# Patient Record
Sex: FEMALE | URBAN - METROPOLITAN AREA
[De-identification: names, ages, dates, MRNs, and addresses within clinical notes are randomized per-mention and may not be internally consistent; named-entity substitution may affect disease eponyms.]

---

## 2017-01-06 ENCOUNTER — CARE COORDINATION (OUTPATIENT)
Dept: CARE COORDINATION | Facility: CLINIC | Age: 57
End: 2017-01-06

## 2017-01-06 NOTE — PROGRESS NOTES
1/6/2017 Clinic Care Coordination Contact  Social Work    Received a call from [place]. [place]  She had attempted to apply on -line but kept getting ERROR.  Together we [place] a [place] call to [place] they helped her get a Username and reset her password. Pt will have her daughter help her tonight on her computer. This writer recommended she contact a Navigator if she has problems.  Provided pt. With the names and #s of two Navigators close to her home.  Pt ended the conversation as she stated she was getting anxious.   Plan: pt will attempt [place] with [place]. Will contact a Navigator if needed and call SW  SW will follow up in 1 week    Keely Sparks [facility] Services  , Clinic Care Coordination  Clinics:  Karen Patel [place], [place]  [phone]   1/6/2017   10:59 AM

## 2017-01-10 ENCOUNTER — CARE COORDINATION (OUTPATIENT)
Dept: CARE COORDINATION | Facility: CLINIC | Age: 57
End: 2017-01-10

## 2017-01-10 NOTE — Clinical Note
[facility] Home  Complex Care Plan  About Me  Patient Name:  Jina Reeves    YOB: 1960  Age:   56 year old   Mir MRN: [id number] Telephone Information:     Home Phone [phone]   Mobile [phone]       Address:    [address] [place] [address] Email address:  [email or web address]      Emergency Contact(s)  Name Relationship Lgl Grd Work Phone Home Phone Mobile Phone   1. HOANG ROBBINS Friend    [phone]   2. KARIE TAMEZ Daughter    [phone]               My Access Plan  Medical Emergency 911   Primary Clinic Line [facility]- [phone]   24 Hour Appointment Line [phone] or  [phone]) (toll-free)   24 Hour Nurse Line [phone] (toll-free)   Preferred Urgent Care     Preferred Hospital     Preferred Pharmacy [facility] [place] [facility] [place], [place] - [address]     Behavioral Health Crisis Line Crisis Connection, [phone] or 650     My Care Team Members  Patient Care Team       Relationship Specialty Notifications Start End    Vee Marlow MD PCP - General Family Practice  11/15/16     Phone: [phone] Fax: [phone]         [facility] [address] [facility] [address] [facility] [address]    Olive Akins, APRN CNP Nurse Practitioner Nurse Practitioner Psych/Mental Health Admissions 11/10/16     Phone: [phone] Fax: [phone]         [facility] [address] [facility] [address]    Sheyla Hawkins, PHD Psychologist Psychology  11/10/16     Phone: [phone] Fax: [phone]         [facility] [address] [facility] [address]    Sylvia Sparks LSW    11/21/16     Comment:  Keely CLAYTON [phone]         My Care Plans  Self Management and Treatment Plan  Goals and (Comments)  Goal #1:  (I will call [place] to see if ins was updated )      100% of goal reached    Goal #2:  (I will explore Transit Link )       10% of goal reached    Action Plans on File:    Advance Care Plans/Directives Type:        My Medical and Care Information  Problem List   Patient Active Problem List   Diagnosis     Obesity     Cocaine abuse in remission     Hyperlipidemia LDL goal <130     Glucose intolerance (pre-diabetes)     Iron deficiency anemia     Vitamin D deficiency     Health Care Home (Not Active)      Anxiety state     Major depressive disorder, recurrent episode, moderate (H)     Acne     Family history of colon cancer     Facet arthropathy, lumbar (H)     Stenosis, spinal, lumbar     Alcohol abuse, episodic drinking behavior     Chronic pain     Vitamin D deficiency disease      Current Medications and Allergies:  See printed Medication Report.    Care Coordination Start Date: 10/24/16   Frequency of Care Coordination:  (4-6 weeks)   Form Last Updated: 01/11/2017

## 2017-01-10 NOTE — PROGRESS NOTES
1/11/2017 Clinic Care Coordination Contact -Social Work    Follow up call to pt. She shared she was finally able [place] get [place].   They finally figured out she needed to be on a computer with [employer]. She now has [employer] until Feb , then will go back to [employer]. This is a huge relief for Jina. The process of doing this on a computer ws very anxiety producing.   This is the reason she has not been able to apply for work.  Jina continues to live with her daughter. Staring to worry. She has a housing voucher that is only good until May. She needs to have an income to be accepted.  Her court date for the appeal for Disability has not yet been set.  She is working with a .  During a regular day- her anxiety is controlled by her meds, But any variation causes her increased anxiety.   She is scheduled to see her Psychiatry CPN, Olive Akins at the [place], on March 1st.  Plans to reschedule a therapy appointment and to call to request a SAD light.  Transportation has also been an obstacle in finding work. Discussed Transit Link and medical transportation    Plan:  Pt will explore Transit Link  SW will follow in 3- 4 weeks    Keely Sparks [facility] Services  , Clinic Care Coordination  Clinics:  Karen Patel [place], [place]  [phone]   1/11/2017   1:38 PM      1/10/2017 Clinic Care Coordination Contact  [facility]/Voicemail- attempt to  Returned call to pt.     Referral Source: PCP  Clinical Data: Care Coordinator Outreach  Outreach attempted x 1.  Left message on voicemail with call back information and requested return call.  Plan:  Care Coordinator will try to reach patient again in 3-5 business days.    Keely Sparks [facility] Services  , Clinic Care Coordination  Clinics:  [place], [place]  [phone]   1/10/2017   2:11 PM

## 2017-01-10 NOTE — Clinical Note
[address] [address]  [phone]      January 11, 2017      Jina Reeves  [address] [place] [id number]    Dear Jina,  I am the Clinic Care Coordinator that works with your primary care provider's clinic. I wanted to thank you for spending the time to talk with me.  I am so glad you were able to accomplish the application for [place]. The [place] can be complicated.   Below is a reminder of what Clinic Care Coordination is and how I can further assist you.     The Clinic Care Coordinator role is a Registered Nurse and/or  who understands the health care system. The goal of Clinic Care Coordination is to help you manage your health and improve access to the [facility] system in the most efficient manner.  The Registered Nurse can assist you in meeting your health care goals by providing education, coordinating services, and strengthening the communication among your providers. The  can assist you with financial, behavioral, psychosocial, and chemical dependency and counseling/psychiatric resources.    I am enclosing some resources you might find helpful.     Resources:  Transit Link  [phone] See enclosed handout with additional information. This can be used for transportation any where if not on a bus line.     [facility]  [phone]-  For transportation to medical appointments while on [employer].     [employer] [phone]  For transportation to medical appointments once on [employer].     Please feel free to keep this letter and contact information to contact me at [phone] with any further questions or concerns that may arise. We at [facility] are focused on providing you with the highest-quality healthcare experience possible and that all starts with you.       Sincerely,       Keely Sparks [facility] Services  , Clinic Care  Coordination  Clinics:  Karen Patel [facility], [place]  [phone]   1/11/2017   2:03 PM    Enclosed: I have enclosed a copy of the Complex Care Plan. This has helpful information and goals that we have talked about. Please keep this in an easy to access place to use as needed.

## 2017-01-23 ENCOUNTER — TELEPHONE (OUTPATIENT)
Dept: FAMILY MEDICINE | Facility: CLINIC | Age: 57
End: 2017-01-23

## 2017-01-23 DIAGNOSIS — M47.816 FACET ARTHROPATHY, LUMBAR: Primary | ICD-10-CM

## 2017-01-23 DIAGNOSIS — G89.4 CHRONIC PAIN SYNDROME: ICD-10-CM

## 2017-01-23 DIAGNOSIS — M48.061 STENOSIS, SPINAL, LUMBAR: ICD-10-CM

## 2017-01-23 NOTE — TELEPHONE ENCOUNTER
Reason for Call:  Medication or medication refill:    Do you use a [facility]?  Name of the pharmacy and phone number for the current request:  [facility] [id number] [place] [facility] [place] [facility], [place] - [address]    Name of the medication requested: oxyCODONE-acetaminophen (PERCOCET) 5-325 MG per tablet    Other request: Patient is asking us to mail RX script to pharmacy.//Please advise. Thank you.    Can we leave a detailed message on this number? YES    Phone number patient can be reached at: Cell number on file:    Telephone Information:   Mobile [phone]       Best Time: Anytime    Call taken on 1/23/2017 at 2:27 PM by Blanco Kaur

## 2017-01-25 RX ORDER — OXYCODONE AND ACETAMINOPHEN 5; 325 MG/1; MG/1
TABLET ORAL
Qty: 90 TABLET | Refills: 0 | Status: SHIPPED | OUTPATIENT
Start: 2017-01-25 | End: 2017-03-06

## 2017-01-25 NOTE — TELEPHONE ENCOUNTER
oxyCODONE-acetaminophen (PERCOCET) 5-325 MG per tablet      Last Written Prescription Date:  12/23/16  Last Fill Quantity: 90,   # refills: 0  [facility] Office Visit with [facility] or [facility] prescribing provider: 12/1/16  Future Office visit:       Routing refill request to provider for review/approval because:  Drug not on the [facility] or [facility] refill protocol or controlled substance

## 2017-02-02 ENCOUNTER — CARE COORDINATION (OUTPATIENT)
Dept: CARE COORDINATION | Facility: CLINIC | Age: 57
End: 2017-02-02

## 2017-02-02 NOTE — PROGRESS NOTES
2/2/2017 Clinic Care Coordination Contact  [facility]/Voicemail    Referral Source: PCP  Clinical Data: Care Coordinator Outreach  Outreach attempted x 1 as a follow up to our last conversation.  Left message on voicemail with call back information and requested return call.  Plan:  Care Coordinator will try to reach patient again in 3-4 weeks due to out of office status Pt does have my contact information    Keely Sparks [facility]  , Clinic Care Coordination  Clinics:  Karen Patel [facility], [place]  [phone]   2/2/2017   2:33 PM

## 2017-02-28 DIAGNOSIS — M47.816 FACET ARTHROPATHY, LUMBAR: ICD-10-CM

## 2017-02-28 DIAGNOSIS — M48.061 STENOSIS, SPINAL, LUMBAR: ICD-10-CM

## 2017-02-28 DIAGNOSIS — G89.4 CHRONIC PAIN SYNDROME: ICD-10-CM

## 2017-02-28 NOTE — TELEPHONE ENCOUNTER
Reason for Call:  Medication or medication refill:    Do you use a [facility]?  Name of the pharmacy and phone number for the current request:  WRITTEN PRESCRIPTION REQUESTED    Name of the medication requested: Percocet    Other request: please call when avaiable for pickup    Can we leave a detailed message on this number? YES    Phone number patient can be reached at: Home number on file [phone] (home)    Best Time: any    Call taken on 2/28/2017 at 12:18 PM by Iqra Walden

## 2017-03-01 ENCOUNTER — CARE COORDINATION (OUTPATIENT)
Dept: CARE COORDINATION | Facility: CLINIC | Age: 57
End: 2017-03-01

## 2017-03-01 NOTE — PROGRESS NOTES
3/1/2017 Clinic Care Coordination Contact  Social Work   Incoming call from pt. Very upset. She received a call from her Psychiatry office at the [facility], saying she has no insurance. [place] and now it appears it ended 2/28.     Verified this  with business office. Pt spoke with [place] [facility] and [facility]. No one can tell her why it was only active for the month of Feb.  Pt will go to [facility] tomorrow.  She plans to keep her Psychiatry  appt tomorrow'    Plan ;   Pt will go to [facility] tomorrow to  Clarify /re- apply/ her insurance. Pt will call with any further needs.  SW will follow up in 2 weeks.    Keely Sparks [facility] Services  , Clinic Care Coordination  Clinics:  Karen Patel [facility], [place]  [phone]   3/1/2017   3:41 PM

## 2017-03-02 ENCOUNTER — TELEPHONE (OUTPATIENT)
Dept: BEHAVIORAL HEALTH | Facility: CLINIC | Age: 57
End: 2017-03-02

## 2017-03-02 ENCOUNTER — OFFICE VISIT (OUTPATIENT)
Dept: PSYCHIATRY | Facility: CLINIC | Age: 57
DRG: 885 | End: 2017-03-02
Attending: NURSE PRACTITIONER
Payer: MEDICAID

## 2017-03-02 ENCOUNTER — ALLIED HEALTH/NURSE VISIT (OUTPATIENT)
Dept: PSYCHIATRY | Facility: CLINIC | Age: 57
End: 2017-03-02
Payer: MEDICAID

## 2017-03-02 ENCOUNTER — OFFICE VISIT (OUTPATIENT)
Dept: PSYCHIATRY | Facility: CLINIC | Age: 57
DRG: 885 | End: 2017-03-02
Attending: PSYCHOLOGIST
Payer: MEDICAID

## 2017-03-02 ENCOUNTER — TELEPHONE (OUTPATIENT)
Dept: PSYCHIATRY | Facility: CLINIC | Age: 57
End: 2017-03-02

## 2017-03-02 ENCOUNTER — HOSPITAL ENCOUNTER (INPATIENT)
Facility: CLINIC | Age: 57
LOS: 4 days | Discharge: HOME | DRG: 885 | End: 2017-03-06
Attending: PSYCHIATRY & NEUROLOGY | Admitting: PSYCHIATRY & NEUROLOGY
Payer: MEDICAID

## 2017-03-02 VITALS
WEIGHT: 224.4 LBS | BODY MASS INDEX: 33.87 KG/M2 | SYSTOLIC BLOOD PRESSURE: 135 MMHG | HEART RATE: 96 BPM | DIASTOLIC BLOOD PRESSURE: 83 MMHG

## 2017-03-02 DIAGNOSIS — F33.1 MAJOR DEPRESSIVE DISORDER, RECURRENT EPISODE, MODERATE (H): Primary | ICD-10-CM

## 2017-03-02 DIAGNOSIS — Z71.89 COUNSELING AND COORDINATION OF CARE: Primary | ICD-10-CM

## 2017-03-02 PROBLEM — F32.A DEPRESSION: Status: ACTIVE | Noted: 2017-03-02

## 2017-03-02 PROCEDURE — 25000132 ZZH RX MED GY IP 250 OP 250 PS 637: Performed by: NURSE PRACTITIONER

## 2017-03-02 PROCEDURE — 12400007 ZZH R&B MH INTERMEDIATE UMMC

## 2017-03-02 RX ORDER — OXYCODONE AND ACETAMINOPHEN 5; 325 MG/1; MG/1
1 TABLET ORAL 3 TIMES DAILY
Status: DISCONTINUED | OUTPATIENT
Start: 2017-03-02 | End: 2017-03-06 | Stop reason: HOSPADM

## 2017-03-02 RX ORDER — TRAZODONE HYDROCHLORIDE 50 MG/1
50 TABLET, FILM COATED ORAL
Status: DISCONTINUED | OUTPATIENT
Start: 2017-03-02 | End: 2017-03-06 | Stop reason: HOSPADM

## 2017-03-02 RX ORDER — BUPROPION HYDROCHLORIDE 150 MG/1
150 TABLET ORAL EVERY MORNING
Status: DISCONTINUED | OUTPATIENT
Start: 2017-03-03 | End: 2017-03-03

## 2017-03-02 RX ORDER — BUPROPION HYDROCHLORIDE 300 MG/1
300 TABLET ORAL DAILY
Status: DISCONTINUED | OUTPATIENT
Start: 2017-03-02 | End: 2017-03-04

## 2017-03-02 RX ORDER — OLANZAPINE 5 MG/1
5 TABLET ORAL
Status: DISCONTINUED | OUTPATIENT
Start: 2017-03-02 | End: 2017-03-06 | Stop reason: HOSPADM

## 2017-03-02 RX ORDER — POLYETHYLENE GLYCOL 3350 17 G/17G
17 POWDER, FOR SOLUTION ORAL DAILY PRN
Status: DISCONTINUED | OUTPATIENT
Start: 2017-03-02 | End: 2017-03-06 | Stop reason: HOSPADM

## 2017-03-02 RX ORDER — ASPIRIN 81 MG/1
81 TABLET ORAL DAILY
Status: DISCONTINUED | OUTPATIENT
Start: 2017-03-02 | End: 2017-03-06 | Stop reason: HOSPADM

## 2017-03-02 RX ORDER — ADAPALENE 45 G/G
GEL TOPICAL AT BEDTIME
Status: DISCONTINUED | OUTPATIENT
Start: 2017-03-02 | End: 2017-03-06 | Stop reason: HOSPADM

## 2017-03-02 RX ORDER — NALOXONE HYDROCHLORIDE 0.4 MG/ML
.1-.4 INJECTION, SOLUTION INTRAMUSCULAR; INTRAVENOUS; SUBCUTANEOUS
Status: DISCONTINUED | OUTPATIENT
Start: 2017-03-02 | End: 2017-03-06 | Stop reason: HOSPADM

## 2017-03-02 RX ORDER — BISACODYL 10 MG
10 SUPPOSITORY, RECTAL RECTAL DAILY PRN
Status: DISCONTINUED | OUTPATIENT
Start: 2017-03-02 | End: 2017-03-06 | Stop reason: HOSPADM

## 2017-03-02 RX ORDER — CLINDAMYCIN PHOSPHATE 10 MG/G
1 GEL TOPICAL 2 TIMES DAILY
Status: DISCONTINUED | OUTPATIENT
Start: 2017-03-02 | End: 2017-03-06 | Stop reason: HOSPADM

## 2017-03-02 RX ORDER — GABAPENTIN 600 MG/1
600 TABLET ORAL 3 TIMES DAILY
Status: DISCONTINUED | OUTPATIENT
Start: 2017-03-02 | End: 2017-03-06 | Stop reason: HOSPADM

## 2017-03-02 RX ORDER — ACETAMINOPHEN 325 MG/1
650 TABLET ORAL EVERY 4 HOURS PRN
Status: DISCONTINUED | OUTPATIENT
Start: 2017-03-02 | End: 2017-03-06 | Stop reason: HOSPADM

## 2017-03-02 RX ORDER — ALUMINA, MAGNESIA, AND SIMETHICONE 2400; 2400; 240 MG/30ML; MG/30ML; MG/30ML
30 SUSPENSION ORAL EVERY 4 HOURS PRN
Status: DISCONTINUED | OUTPATIENT
Start: 2017-03-02 | End: 2017-03-06 | Stop reason: HOSPADM

## 2017-03-02 RX ORDER — OLANZAPINE 10 MG/2ML
5 INJECTION, POWDER, FOR SOLUTION INTRAMUSCULAR
Status: DISCONTINUED | OUTPATIENT
Start: 2017-03-02 | End: 2017-03-06 | Stop reason: HOSPADM

## 2017-03-02 RX ORDER — HYDROXYZINE HYDROCHLORIDE 25 MG/1
25-50 TABLET, FILM COATED ORAL EVERY 4 HOURS PRN
Status: DISCONTINUED | OUTPATIENT
Start: 2017-03-02 | End: 2017-03-06 | Stop reason: HOSPADM

## 2017-03-02 RX ADMIN — OXYCODONE HYDROCHLORIDE AND ACETAMINOPHEN 1 TABLET: 5; 325 TABLET ORAL at 19:49

## 2017-03-02 RX ADMIN — CLINDAMYCIN PHOSPHATE 1 G: 10 GEL TOPICAL at 19:49

## 2017-03-02 RX ADMIN — ASPIRIN 81 MG: 81 TABLET, COATED ORAL at 19:49

## 2017-03-02 RX ADMIN — GABAPENTIN 600 MG: 600 TABLET, FILM COATED ORAL at 19:49

## 2017-03-02 RX ADMIN — HYDROXYZINE HYDROCHLORIDE 50 MG: 25 TABLET ORAL at 19:49

## 2017-03-02 ASSESSMENT — ACTIVITIES OF DAILY LIVING (ADL)
ORAL_HYGIENE: INDEPENDENT
DRESS: SCRUBS (BEHAVIORAL HEALTH);INDEPENDENT
LAUNDRY: UNABLE TO COMPLETE
GROOMING: INDEPENDENT

## 2017-03-02 NOTE — IP AVS SNAPSHOT
MRN:[id number]                      After Visit Summary   3/2/2017    Jina Reeves    MRN: [id number]           Thank you!     Thank you for choosing [facility] for your care. Our goal is always to provide you with excellent care.        Patient Information     Date Of Birth          1960        About your hospital stay     You were admitted on:  March 2, 2017 You last received care in the:  [address]    You were discharged on:  March 6, 2017       Who to Call     For medical emergencies, please call 911.  For non-urgent questions about your medical care, please call your primary care provider or clinic, [phone]          Attending Provider     Provider Specialty    Tonya Merritt MD Psychiatry       Primary Care Provider Office Phone # Fax #    Vee Marlow [phone]       [address] [address]        Your next 10 appointments already scheduled     Mar 13, 2017 10:40 AM CDT   Office Visit with Vee Marlow MD   [facility]    [address] [place] [address] [phone]           Bring a current list of meds and any records pertaining to this visit.  For Physicals, please bring immunization records and any forms needing to be filled out.  Please arrive 10 minutes early to complete paperwork.            Mar 15, 2017  3:00 PM CDT   Adult Med Follow UP with HALIE Ray CNP   Psychiatry Clinic ([facility])    [address]  [address] [facility] [address] [phone]              Further instructions from your care team       Behavioral Discharge Planning and Instructions      Summary:  You were admitted on 3/2/2017  For Suicidal Ideations.  You were treated by Margaux Merlos DNP and discharged on 03/06/17 from [facility].    Main Diagnosis:   Major Depressive Disorder,  "recurrent, severe, with psychotic future    Health Care Follow-up Appointments:     See Appointment scheduled at [facility] and  [facility] above.    Attend all scheduled appointments with your outpatient providers. Call at least 24 hours in advance if you need to reschedule an appointment to ensure continued access to your outpatient providers.   Major Treatments, Procedures and Findings:  You were provided with: a psychiatric assessment, assessed for medical stability, medication evaluation and/or management, group therapy and milieu management    Symptoms to Report: feeling more aggressive, increased confusion, losing more sleep, mood getting worse or thoughts of suicide    Early warning signs can include: increased depression or anxiety sleep disturbances increased thoughts or behaviors of suicide or self-harm  increased unusual thinking, such as paranoia or hearing voices    Safety and Wellness:  Take all medicines as directed.  Make no changes unless your doctor suggests them.      Follow treatment recommendations.  Refrain from alcohol and non-prescribed drugs.  If there is a concern for safety, call 911.    Resources:   Crisis Intervention: 943.453.1812 or 057-165-2111 (TTY: 910.143.6714).  Call anytime for help.  [place] on Mental Illness (www.mn.jamar.org): 383.623.8162 or 918-582-6354.  Alcoholics Anonymous (www.alcoholics-anonymous.org): Check your phone book for your local chapter.  Suicide Awareness Voices of Education (SAVE) (www.save.org): 888-511-SAVE (7283)  Mental Health Association of [place] (www.mentalhealth.org): 442.624.8885 or 591-884-4042  [facility] Crisis (COPE) Response - Adult [phone]  Text 4 Life: txt \"LIFE\" to [address] for immediate support and crisis intervention  Crisis text line: Text \"START\" to 658-215. Free, confidential, 24/7.  Crisis Intervention: 803.904.1883 or 203-138-9147. Call anytime for help.     The treatment team has appreciated the " "opportunity to work with you.     If you have any questions or concerns our unit number is [phone].  You may be receiving a follow-up phone call within the next three days from a representative from behavioral health.    You have identified the best phone number to reach you as [phone] .    Pending Results     No orders found from 2/28/2017 to 3/3/2017.            Admission Information     Date & Time Provider Department Dept. Phone    3/2/2017 Tonya Merritt MD  [phone]      Your Vitals Were     Blood Pressure Pulse Temperature Respirations Height Weight    135/85 79 97.5  F (36.4  C) (Oral) 16 1.753 m (5' 9\") 101.1 kg (222 lb 12.8 oz)    Last Period BMI (Body Mass Index)                06/05/2013 32.9 kg/m2          [facility] Information     [employer] gives you secure access to your electronic health record. If you see a primary care provider, you can also send messages to your care team and make appointments. If you have questions, please call your primary care clinic.  If you do not have a primary care provider, please call [phone] and they will assist you.        Care EveryWhere ID     This is your Care EveryWhere ID. This could be used by other organizations to access your [facility] medical records  [id number]           Review of your medicines      START taking        Dose / Directions    topiramate 25 MG tablet   Commonly known as:  TOPAMAX   Used for:  Major depressive disorder, recurrent episode, moderate (H)        Dose:  25 mg   Take 1 tablet (25 mg) by mouth 2 times daily   Quantity:  60 tablet   Refills:  0         CONTINUE these medicines which may have CHANGED, or have new prescriptions. If we are uncertain of the size of tablets/capsules you have at home, strength may be listed as something that might have changed.        Dose / Directions    buPROPion 150 MG 24 hr tablet   Commonly known as:  WELLBUTRIN XL   This may have changed:  Another medication with the same name " was removed. Continue taking this medication, and follow the directions you see here.   Used for:  Major depressive disorder, recurrent episode, moderate (H)        Dose:  150 mg   Take 1 tablet (150 mg) by mouth every morning   Quantity:  30 tablet   Refills:  0       * order for DME   This may have changed:  Another medication with the same name was removed. Continue taking this medication, and follow the directions you see here.   Used for:  Seasonal affective disorder (H)        one light box for seasonal affective disorder   Quantity:  1   Refills:  0       * order for DME   This may have changed:  Another medication with the same name was removed. Continue taking this medication, and follow the directions you see here.   Used for:  Pain in wrist, right        Equipment being ordered: thumb spica wrist brace   Quantity:  1 Device   Refills:  0       * STATIN NOT PRESCRIBED (INTENTIONAL)   This may have changed:  Another medication with the same name was removed. Continue taking this medication, and follow the directions you see here.   Used for:  Glucose intolerance (pre-diabetes)        by Other route continuous prn.   Refills:  0       * Notice:  This list has 3 medication(s) that are the same as other medications prescribed for you. Read the directions carefully, and ask your doctor or other care provider to review them with you.      CONTINUE these medicines which have NOT CHANGED        Dose / Directions    ASPIR-LOW 81 MG EC tablet   Used for:  Glucose intolerance (pre-diabetes), Hyperlipidemia LDL goal <130   Generic drug:  aspirin        TAKE ONE TABLET BY MOUTH ONE TIME DAILY   Quantity:  30 tablet   Refills:  3       clindamycin 1 % topical gel   Commonly known as:  CLINDAMAX   Used for:  Facial cellulitis        Dose:  1 applicator   Apply 1 g topically 2 times daily   Quantity:  60 g   Refills:  3       DIFFERIN 0.1 % cream   Used for:  Acne, unspecified acne type   Generic drug:  adapalene         Apply  topically At Bedtime. ANGELO. Will replace Rx for adapalene (DIFFERIN) 0.1 % gel   Quantity:  30 g   Refills:  5       Ferrous Sulfate Gran        by Misc.(Non-Drug; Combo Route) route.   Refills:  0       gabapentin 600 MG tablet   Commonly known as:  NEURONTIN   Used for:  Stenosis, spinal, lumbar, Facet arthropathy, lumbar (H)        Dose:  600 mg   Take 1 tablet (600 mg) by mouth 3 times daily   Quantity:  270 tablet   Refills:  1       hydrOXYzine 25 MG capsule   Commonly known as:  VISTARIL   Used for:  Major depressive disorder, recurrent episode, moderate (H)        Dose:  25 mg   Take 1 capsule (25 mg) by mouth 3 times daily as needed for anxiety   Quantity:  120 capsule   Refills:  2       oxyCODONE-acetaminophen 5-325 MG per tablet   Commonly known as:  PERCOCET   Used for:  Facet arthropathy, lumbar (H), Stenosis, spinal, lumbar, Chronic pain syndrome        Take 1 tablet by mouth three times daily for back pain. Fill on or after 1/28/17.   Quantity:  90 tablet   Refills:  0       polyethylene glycol powder   Commonly known as:  MIRALAX   Used for:  Constipation        Dose:  1 capful   Take 17 g by mouth daily as needed.   Quantity:  30 capful   Refills:  11       vitamin D 2000 UNITS tablet   Used for:  Vitamin D deficiency disease        Dose:  2000 Units   Take 2,000 Units by mouth daily   Quantity:  100 tablet   Refills:  3            Where to get your medicines      These medications were sent to [facility] [place], [place] - [address]  [address] [place] [address], [facility] [address]     Phone:  [phone]     buPROPion 150 MG 24 hr tablet    topiramate 25 MG tablet                Protect others around you: Learn how to safely use, store and throw away your medicines at www.disposemymeds.org.             Medication List: This is a list of all your medications and when to take them. Check marks below indicate your daily home schedule. Keep this list as a reference.       Medications           Morning Afternoon Evening Bedtime As Needed    ASPIR-LOW 81 MG EC tablet   TAKE ONE TABLET BY MOUTH ONE TIME DAILY   Last time this was given:  81 mg on 3/6/2017  8:42 AM   Generic drug:  aspirin                                buPROPion 150 MG 24 hr tablet   Commonly known as:  WELLBUTRIN XL   Take 1 tablet (150 mg) by mouth every morning   Last time this was given:  150 mg on 3/6/2017  8:42 AM                                clindamycin 1 % topical gel   Commonly known as:  CLINDAMAX   Apply 1 g topically 2 times daily   Last time this was given:  1 g on 3/6/2017  8:42 AM                                DIFFERIN 0.1 % cream   Apply  topically At Bedtime. ANGELO. Will replace Rx for adapalene (DIFFERIN) 0.1 % gel   Generic drug:  adapalene                                Ferrous Sulfate Gran   by Misc.(Non-Drug; Combo Route) route.                                gabapentin 600 MG tablet   Commonly known as:  NEURONTIN   Take 1 tablet (600 mg) by mouth 3 times daily   Last time this was given:  600 mg on 3/6/2017  8:42 AM                                hydrOXYzine 25 MG capsule   Commonly known as:  VISTARIL   Take 1 capsule (25 mg) by mouth 3 times daily as needed for anxiety                                * order for DME   one light box for seasonal affective disorder                                * order for DME   Equipment being ordered: thumb spica wrist brace                                oxyCODONE-acetaminophen 5-325 MG per tablet   Commonly known as:  PERCOCET   Take 1 tablet by mouth three times daily for back pain. Fill on or after 1/28/17.   Last time this was given:  1 tablet on 3/6/2017  8:42 AM                                polyethylene glycol powder   Commonly known as:  MIRALAX   Take 17 g by mouth daily as needed.                                * STATIN NOT PRESCRIBED (INTENTIONAL)   by Other route continuous prn.                                topiramate 25 MG tablet   Commonly  known as:  TOPAMAX   Take 1 tablet (25 mg) by mouth 2 times daily   Last time this was given:  25 mg on 3/6/2017  8:42 AM                                vitamin D 2000 UNITS tablet   Take 2,000 Units by mouth daily   Last time this was given:  2,000 Units on 3/6/2017  8:42 AM                                * Notice:  This list has 3 medication(s) that are the same as other medications prescribed for you. Read the directions carefully, and ask your doctor or other care provider to review them with you.

## 2017-03-02 NOTE — PROGRESS NOTES
Pt had just met with Olive Akins who had begun the process of inpatient admission. Pt was in the process of meeting with the  to discuss hospitalizations and referrals. Spoke to pt and  briefly and encouraged pt to follow through with the inpatient hospitalization per Olive Akins's recommendation.

## 2017-03-02 NOTE — TELEPHONE ENCOUNTER
Per Olive Akins, APRN CNP:  Pt to be admitted directly to - Dr. Merritt's service, unit ready for admission.  Security contacted.  , writer, Sussy Cooper CMA assisted with transport.  Pt arrived without incident.

## 2017-03-02 NOTE — TELEPHONE ENCOUNTER
Reason for Call:  Other prescription    Detailed comments: Running out of Percocet need asap darryl when approved - sending HIGH [employer]IORITY message    Phone Number Patient can be reached at: Cell number on file:    Telephone Information:   Mobile [phone]       Best Time: any    Can we leave a detailed message on this number? YES    Call taken on 3/2/2017 at 12:44 PM by Iqra Walden

## 2017-03-02 NOTE — MR AVS SNAPSHOT
After Visit Summary   3/2/2017    Jina Reeves    MRN: [id number]           Patient Information     Date Of Birth          1960        Visit Information        Provider Department      3/2/2017 2:00 PM Olive Akins APRN CNP Psychiatry Clinic        Today's Diagnoses     Major depressive disorder, recurrent episode, moderate (H)    -  1       Follow-ups after your visit        Follow-up notes from your care team     Return in about 4 weeks (around 3/30/2017).      Your next 10 appointments already scheduled     Mar 10, 2017  9:40 AM CST   Office Visit with Vee Marlow MD   [facility] ([facility])    [address] [place] [id number] [phone]           Bring a current list of meds and any records pertaining to this visit.  For Physicals, please bring immunization records and any forms needing to be filled out.  Please arrive 10 minutes early to complete paperwork.            Mar 15, 2017  3:00 PM CDT   Adult Med Follow UP with HALIE Ray CNP   Psychiatry Clinic ([facility])    [place] [id number] [facility] [address] [phone]              Who to contact     Please call your clinic at [phone] to:    Ask questions about your health    Make or cancel appointments    Discuss your medicines    Learn about your test results    Speak to your doctor   If you have compliments or concerns about an experience at your clinic, or if you wish to file a complaint, please contact [facility] Patient Relations at [phone] or email us at [email or web address]         Additional Information About Your Visit        MyChart Information     DEXMAhart gives you secure access to your electronic health record. If you see a primary care provider, you can also send messages to your care team and make appointments. If you have  questions, please call your primary care clinic.  If you do not have a primary care provider, please call [phone] and they will assist you.      Intermedia is an electronic gateway that provides easy, online access to your medical records. With Intermedia, you can request a clinic appointment, read your test results, renew a prescription or communicate with your care team.     To access your existing account, please contact your [facility] or call [phone] for assistance.        Care EveryWhere ID     This is your Care EveryWhere ID. This could be used by other organizations to access your [place] medical records  [id number]        Your Vitals Were     Pulse Last Period BMI (Body Mass Index)             96 06/05/2013 33.87 kg/m2          Blood Pressure from Last 3 Encounters:   03/06/17 135/85   03/02/17 135/83   12/01/16 117/83    Weight from Last 3 Encounters:   03/05/17 101.1 kg (222 lb 12.8 oz)   03/02/17 101.8 kg (224 lb 6.4 oz)   12/01/16 95.1 kg (209 lb 9.6 oz)              Today, you had the following     No orders found for display         Today's Medication Changes          These changes are accurate as of: 3/2/17  4:11 PM.  If you have any questions, ask your nurse or doctor.               Stop taking these medicines if you haven't already. Please contact your care team if you have questions.     traZODone 50 MG tablet   Commonly known as:  DESYREL   Stopped by:  Olive Akins APRN CNP                    Primary Care Provider Office Phone # Fax #    Vee Marlow [phone]       [facility] [address] [place] [address] [address]  [facility] [address]        Thank you!     Thank you for choosing PSYCHIATRY CLINIC  for your care. Our goal is always to provide you with excellent care. Hearing back from our patients is one way we can continue to improve our services. Please take a few minutes to complete the written survey that you may receive in  the mail after your visit with us. Thank you!             Your Updated Medication List - Protect others around you: Learn how to safely use, store and throw away your medicines at www.disposemymeds.org.          This list is accurate as of: 3/2/17  4:11 PM.  Always use your most recent med list.                   Brand Name Dispense Instructions for use    * ACE NOT PRESCRIBED (INTENTIONAL)      by Other route continuous prn.       ASPIR-LOW 81 MG EC tablet   Generic drug:  aspirin     30 tablet    TAKE ONE TABLET BY MOUTH ONE TIME DAILY       * buPROPion 300 MG 24 hr tablet    WELLBUTRIN XL    90 tablet    TAKE ONE TABLET BY MOUTH EVERY DAY IN THE MORNING.       * buPROPion 150 MG 24 hr tablet    WELLBUTRIN XL    90 tablet    Take 1 tablet (150 mg) by mouth every morning       * buPROPion 150 MG 24 hr tablet    WELLBUTRIN XL    30 tablet    Take 1 tablet (150 mg) by mouth every morning       clindamycin 1 % topical gel    CLINDAMAX    60 g    Apply 1 g topically 2 times daily       DIFFERIN 0.1 % cream   Generic drug:  adapalene     30 g    Apply  topically At Bedtime. ANGELO. Will replace Rx for adapalene (DIFFERIN) 0.1 % gel       Ferrous Sulfate Gran      by Misc.(Non-Drug; Combo Route) route.       gabapentin 600 MG tablet    NEURONTIN    270 tablet    Take 1 tablet (600 mg) by mouth 3 times daily       hydrOXYzine 25 MG capsule    VISTARIL    120 capsule    Take 1 capsule (25 mg) by mouth 3 times daily as needed for anxiety       * order for DME     1    one light box for seasonal affective disorder       * order for DME     1 Device    Equipment being ordered: thumb spica wrist brace       polyethylene glycol powder    MIRALAX    30 capful    Take 17 g by mouth daily as needed.       * STATIN NOT PRESCRIBED (INTENTIONAL)      by Other route continuous prn.       topiramate 25 MG tablet    TOPAMAX    60 tablet    Take 1 tablet (25 mg) by mouth 2 times daily       vitamin D 2000 UNITS tablet     100 tablet    Take  2,000 Units by mouth daily       * Notice:  This list has 7 medication(s) that are the same as other medications prescribed for you. Read the directions carefully, and ask your doctor or other care provider to review them with you.
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
## 2017-03-02 NOTE — PROGRESS NOTES
1615--Pt was directly admitted from the psych clinic. Pt is reporting that she was unaware that she was going to be admitted and does not want admission. Pt reported that she was just coming for an evaluation and to talk with someone.     1640- Olive DAIGLE from the psych clinic was contacted. She is filling out hold paperwork and will be bringing it to the unit shortly as per SW report patient had told her that she had suicidal thoughts with a plan to OD on antifreeze and that she had intent to do this.
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
## 2017-03-02 NOTE — MR AVS SNAPSHOT
After Visit Summary   3/2/2017    Jina Reeves    MRN: [id number]           Patient Information     Date Of Birth          1960        Visit Information        Provider Department      3/2/2017 2:30 PM Jemima Ozuna LGSW Psychiatry Clinic        Today's Diagnoses     Counseling and coordination of care    -  1       Follow-ups after your visit        Your next 10 appointments already scheduled     Mar 13, 2017 10:40 AM CDT   Office Visit with Vee Marlow MD   [facility]    [address] [place] [address] [phone]           Bring a current list of meds and any records pertaining to this visit.  For Physicals, please bring immunization records and any forms needing to be filled out.  Please arrive 10 minutes early to complete paperwork.            Mar 15, 2017  3:00 PM CDT   Adult Med Follow UP with HALIE Ray CNP   [facility]    [place] [id number] [facility] [address] [phone]              Who to contact     Please call your clinic at [phone] to:    Ask questions about your health    Make or cancel appointments    Discuss your medicines    Learn about your test results    Speak to your doctor   If you have compliments or concerns about an experience at your clinic, or if you wish to file a complaint, please contact [facility] Patient Relations at [phone] or email us at [email or web address]         Additional Information About Your Visit        MyChart Information     [employer] Fishingt gives you secure access to your electronic health record. If you see a primary care provider, you can also send messages to your care team and make appointments. If you have questions, please call your primary care clinic.  If you do not have a primary care provider, please call [phone] and  they will assist you.      utoopia is an electronic gateway that provides easy, online access to your medical records. With utoopia, you can request a clinic appointment, read your test results, renew a prescription or communicate with your care team.     To access your existing account, please contact your [facility] or call [phone] for assistance.        Care EveryWhere ID     This is your Care EveryWhere ID. This could be used by other organizations to access your [place] medical records  [id number]        Your Vitals Were     Last Period                   06/05/2013            Blood Pressure from Last 3 Encounters:   03/06/17 135/85   03/02/17 135/83   12/01/16 117/83    Weight from Last 3 Encounters:   03/05/17 101.1 kg (222 lb 12.8 oz)   03/02/17 101.8 kg (224 lb 6.4 oz)   12/01/16 95.1 kg (209 lb 9.6 oz)              Today, you had the following     No orders found for display         Today's Medication Changes          These changes are accurate as of: 3/2/17  4:11 PM.  If you have any questions, ask your nurse or doctor.               Stop taking these medicines if you haven't already. Please contact your care team if you have questions.     traZODone 50 MG tablet   Commonly known as:  DESYREL   Stopped by:  Olive Akins APRN CNP                    Primary Care Provider Office Phone # Fax #    Vee [place] Marlow [phone]       [address] [address]        Thank you!     Thank you for choosing PSYCHIATRY CLINIC  for your care. Our goal is always to provide you with excellent care. Hearing back from our patients is one way we can continue to improve our services. Please take a few minutes to complete the written survey that you may receive in the mail after your visit with us. Thank you!             Your Updated Medication List - Protect others around you: Learn how to safely use, store and throw  away your medicines at www.disposemymeds.org.          This list is accurate as of: 3/2/17  4:11 PM.  Always use your most recent med list.                   Brand Name Dispense Instructions for use    * ACE NOT PRESCRIBED (INTENTIONAL)      by Other route continuous prn.       ASPIR-LOW 81 MG EC tablet   Generic drug:  aspirin     30 tablet    TAKE ONE TABLET BY MOUTH ONE TIME DAILY       * buPROPion 300 MG 24 hr tablet    WELLBUTRIN XL    90 tablet    TAKE ONE TABLET BY MOUTH EVERY DAY IN THE MORNING.       * buPROPion 150 MG 24 hr tablet    WELLBUTRIN XL    90 tablet    Take 1 tablet (150 mg) by mouth every morning       * buPROPion 150 MG 24 hr tablet    WELLBUTRIN XL    30 tablet    Take 1 tablet (150 mg) by mouth every morning       clindamycin 1 % topical gel    CLINDAMAX    60 g    Apply 1 g topically 2 times daily       DIFFERIN 0.1 % cream   Generic drug:  adapalene     30 g    Apply  topically At Bedtime. ANGELO. Will replace Rx for adapalene (DIFFERIN) 0.1 % gel       Ferrous Sulfate Gran      by Misc.(Non-Drug; Combo Route) route.       gabapentin 600 MG tablet    NEURONTIN    270 tablet    Take 1 tablet (600 mg) by mouth 3 times daily       hydrOXYzine 25 MG capsule    VISTARIL    120 capsule    Take 1 capsule (25 mg) by mouth 3 times daily as needed for anxiety       * order for DME     1    one light box for seasonal affective disorder       * order for DME     1 Device    Equipment being ordered: thumb spica wrist brace       oxyCODONE-acetaminophen 5-325 MG per tablet    PERCOCET    90 tablet    Take 1 tablet by mouth three times daily for back pain. Fill on or after 1/28/17.       polyethylene glycol powder    MIRALAX    30 capful    Take 17 g by mouth daily as needed.       * STATIN NOT PRESCRIBED (INTENTIONAL)      by Other route continuous prn.       topiramate 25 MG tablet    TOPAMAX    60 tablet    Take 1 tablet (25 mg) by mouth 2 times daily       vitamin D 2000 UNITS tablet     100 tablet     Take 2,000 Units by mouth daily       * Notice:  This list has 7 medication(s) that are the same as other medications prescribed for you. Read the directions carefully, and ask your doctor or other care provider to review them with you.

## 2017-03-02 NOTE — MR AVS SNAPSHOT
After Visit Summary   3/2/2017    Jina Reeves    MRN: [id number]           Patient Information     Date Of Birth          1960        Visit Information        Provider Department      3/2/2017 3:00 PM Sheyla Hawkins, PhD Psychiatry Clinic        Today's Diagnoses     Major depressive disorder, recurrent episode, moderate (H)    -  1       Follow-ups after your visit        Who to contact     Please call your clinic at [phone] to:    Ask questions about your health    Make or cancel appointments    Discuss your medicines    Learn about your test results    Speak to your doctor   If you have compliments or concerns about an experience at your clinic, or if you wish to file a complaint, please contact [facility] Patient Relations at [phone] or email us at [email or web address]         Additional Information About Your Visit        MyChart Information     Truminimt gives you secure access to your electronic health record. If you see a primary care provider, you can also send messages to your care team and make appointments. If you have questions, please call your primary care clinic.  If you do not have a primary care provider, please call [phone] and they will assist you.      Adeze is an electronic gateway that provides easy, online access to your medical records. With Adeze, you can request a clinic appointment, read your test results, renew a prescription or communicate with your care team.     To access your existing account, please contact your [facility] or call [phone] for assistance.        Care EveryWhere ID     This is your Care EveryWhere ID. This could be used by other organizations to access your [place] medical records  [id number]        Your Vitals Were     Last Period                   06/05/2013            Blood Pressure from Last 3 Encounters:   03/02/17 135/83   12/01/16  117/83   11/01/16 115/72    Weight from Last 3 Encounters:   03/02/17 101.8 kg (224 lb 6.4 oz)   12/01/16 95.1 kg (209 lb 9.6 oz)   11/01/16 94.8 kg (209 lb)              Today, you had the following     No orders found for display         Today's Medication Changes          These changes are accurate as of: 3/2/17  3:20 PM.  If you have any questions, ask your nurse or doctor.               Stop taking these medicines if you haven't already. Please contact your care team if you have questions.     traZODone 50 MG tablet   Commonly known as:  DESYREL   Stopped by:  Olive Akins APRN CNP                    Primary Care Provider Office Phone # Fax #    Vee America Marlow [phone]       [address] [address]        Thank you!     Thank you for choosing PSYCHIATRY CLINIC  for your care. Our goal is always to provide you with excellent care. Hearing back from our patients is one way we can continue to improve our services. Please take a few minutes to complete the written survey that you may receive in the mail after your visit with us. Thank you!             Your Updated Medication List - Protect others around you: Learn how to safely use, store and throw away your medicines at www.disposemymeds.org.          This list is accurate as of: 3/2/17  3:20 PM.  Always use your most recent med list.                   Brand Name Dispense Instructions for use    * ACE NOT PRESCRIBED (INTENTIONAL)      by Other route continuous prn.       albuterol 108 (90 BASE) MCG/ACT Inhaler    PROAIR HFA/PROVENTIL HFA/VENTOLIN HFA    1 Inhaler    Inhale 2 puffs into the lungs every 6 hours as needed for shortness of breath / dyspnea or wheezing       ASPIR-LOW 81 MG EC tablet   Generic drug:  aspirin     30 tablet    TAKE ONE TABLET BY MOUTH ONE TIME DAILY       bisacodyl 5 MG EC tablet      Take  by mouth.       * buPROPion 300 MG 24 hr tablet    WELLBUTRIN XL    90  tablet    TAKE ONE TABLET BY MOUTH EVERY DAY IN THE MORNING.       * buPROPion 150 MG 24 hr tablet    WELLBUTRIN XL    90 tablet    Take 1 tablet (150 mg) by mouth every morning       clindamycin 1 % topical gel    CLINDAMAX    60 g    Apply 1 g topically 2 times daily       DIFFERIN 0.1 % cream   Generic drug:  adapalene     30 g    Apply  topically At Bedtime. ANGELO. Will replace Rx for adapalene (DIFFERIN) 0.1 % gel       docusate sodium 100 MG capsule    COLACE    100 capsule    Take 1-4 capsules (100-400 mg) by mouth 2 times daily       Ferrous Sulfate Gran      by Misc.(Non-Drug; Combo Route) route.       gabapentin 600 MG tablet    NEURONTIN    270 tablet    Take 1 tablet (600 mg) by mouth 3 times daily       hydrOXYzine 25 MG capsule    VISTARIL    120 capsule    Take 1 capsule (25 mg) by mouth 3 times daily as needed for anxiety       * order for DME     1    one light box for seasonal affective disorder       * order for DME     1 Device    Equipment being ordered: thumb spica wrist brace       oxyCODONE-acetaminophen 5-325 MG per tablet    PERCOCET    90 tablet    Take 1 tablet by mouth three times daily for back pain. Fill on or after 1/28/17.       polyethylene glycol powder    MIRALAX    30 capful    Take 17 g by mouth daily as needed.       * STATIN NOT PRESCRIBED (INTENTIONAL)      by Other route continuous prn.       vitamin D 2000 UNITS tablet     100 tablet    Take 2,000 Units by mouth daily       * Notice:  This list has 6 medication(s) that are the same as other medications prescribed for you. Read the directions carefully, and ask your doctor or other care provider to review them with you.
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
## 2017-03-02 NOTE — PROGRESS NOTES
03/02/17 1745   Patient Belongings   Patient Belongings wallet;purse;shoes;cell phone/electronics;money (see comment);glasses   Disposition of Belongings locked on unit    Belongings Search Yes   Clothing Search Yes     2 wallets (grey and black)  Makeup purse  Cell phone in black case  Black purse  Black boots  Brown hat  Sunglasses  Net Spend Debit master card-4335-security    [place] Drivers License-security   [employer] debit card-3699-security  [place] EBT-5623-security   [id number]-wolhizli  [employer] visa-3308-security  [employer] one Visa-0556-security   Social security-security  [place] EBT-security   CASH 56.00-security  Black coat  Red scarf     Addendum:   Hydroxide 25mg- security [id number]  Carly Torres 9:35 PM     ADMISSION:  I am responsible for any personal items that are not sent to the safe or pharmacy. [facility] is not responsible for loss, theft or damage of any property in my possession.    Patient Signature _____________________ Date/Time _____________________    Staff Signature _______________________ Date/Time _____________________    2nd Staff person, if patient is unable/unwilling to sign  ___________________________________ Date/Time _____________________  DISCHARGE:  All personal items have been returned to me.    Patient Signature _____________________ Date/Time _____________________    Staff Signature _______________________ Date/Time _____________________

## 2017-03-02 NOTE — IP AVS SNAPSHOT
[facility]    [address]    [place] [address]    Phone:  [phone]                                       After Visit Summary   3/2/2017    Jina Reeves    MRN: [id number]           After Visit Summary Signature Page     I have received my discharge instructions, and my questions have been answered. I have discussed any challenges I see with this plan with the nurse or doctor.    ..........................................................................................................................................  Patient/Patient Representative Signature      ..........................................................................................................................................  Patient Representative Print Name and Relationship to Patient    ..................................................               ................................................  Date                                            Time    ..........................................................................................................................................  Reviewed by Signature/Title    ...................................................              ..............................................  Date                                                            Time

## 2017-03-03 LAB
ALBUMIN SERPL-MCNC: 3 G/DL (ref 3.4–5)
ALP SERPL-CCNC: 67 U/L (ref 40–150)
ALT SERPL W P-5'-P-CCNC: 20 U/L (ref 0–50)
AMPHETAMINES UR QL SCN: NORMAL
ANION GAP SERPL CALCULATED.3IONS-SCNC: 7 MMOL/L (ref 3–14)
AST SERPL W P-5'-P-CCNC: 20 U/L (ref 0–45)
BARBITURATES UR QL: NORMAL
BASOPHILS # BLD AUTO: 0 10E9/L (ref 0–0.2)
BASOPHILS NFR BLD AUTO: 0.4 %
BENZODIAZ UR QL: NORMAL
BILIRUB SERPL-MCNC: 0.7 MG/DL (ref 0.2–1.3)
BUN SERPL-MCNC: 9 MG/DL (ref 7–30)
CALCIUM SERPL-MCNC: 9 MG/DL (ref 8.5–10.1)
CANNABINOIDS UR QL SCN: NORMAL
CHLORIDE SERPL-SCNC: 112 MMOL/L (ref 94–109)
CHOLEST SERPL-MCNC: 176 MG/DL
CO2 SERPL-SCNC: 26 MMOL/L (ref 20–32)
COCAINE UR QL: NORMAL
CREAT SERPL-MCNC: 0.59 MG/DL (ref 0.52–1.04)
DEPRECATED CALCIDIOL+CALCIFEROL SERPL-MC: 16 UG/L (ref 20–75)
DIFFERENTIAL METHOD BLD: NORMAL
EOSINOPHIL # BLD AUTO: 0.1 10E9/L (ref 0–0.7)
EOSINOPHIL NFR BLD AUTO: 2.9 %
ERYTHROCYTE [DISTWIDTH] IN BLOOD BY AUTOMATED COUNT: 13.5 % (ref 10–15)
ETHANOL UR QL SCN: NORMAL
FOLATE SERPL-MCNC: 19.6 NG/ML
GFR SERPL CREATININE-BSD FRML MDRD: ABNORMAL ML/MIN/1.7M2
GLUCOSE SERPL-MCNC: 84 MG/DL (ref 70–99)
HCT VFR BLD AUTO: 38.3 % (ref 35–47)
HDLC SERPL-MCNC: 56 MG/DL
HGB BLD-MCNC: 12.4 G/DL (ref 11.7–15.7)
IMM GRANULOCYTES # BLD: 0 10E9/L (ref 0–0.4)
IMM GRANULOCYTES NFR BLD: 0 %
LDLC SERPL CALC-MCNC: 95 MG/DL
LYMPHOCYTES # BLD AUTO: 2 10E9/L (ref 0.8–5.3)
LYMPHOCYTES NFR BLD AUTO: 42.7 %
MCH RBC QN AUTO: 30 PG (ref 26.5–33)
MCHC RBC AUTO-ENTMCNC: 32.4 G/DL (ref 31.5–36.5)
MCV RBC AUTO: 93 FL (ref 78–100)
MONOCYTES # BLD AUTO: 0.3 10E9/L (ref 0–1.3)
MONOCYTES NFR BLD AUTO: 5.3 %
NEUTROPHILS # BLD AUTO: 2.3 10E9/L (ref 1.6–8.3)
NEUTROPHILS NFR BLD AUTO: 48.7 %
NONHDLC SERPL-MCNC: 120 MG/DL
NRBC # BLD AUTO: 0 10*3/UL
NRBC BLD AUTO-RTO: 0 /100
OPIATES UR QL SCN: NORMAL
PCP UR QL SCN: NORMAL
PLATELET # BLD AUTO: 241 10E9/L (ref 150–450)
POTASSIUM SERPL-SCNC: 3.8 MMOL/L (ref 3.4–5.3)
PROT SERPL-MCNC: 6.1 G/DL (ref 6.8–8.8)
RBC # BLD AUTO: 4.14 10E12/L (ref 3.8–5.2)
SODIUM SERPL-SCNC: 145 MMOL/L (ref 133–144)
TRIGL SERPL-MCNC: 125 MG/DL
TSH SERPL DL<=0.005 MIU/L-ACNC: 0.92 MU/L (ref 0.4–4)
VIT B12 SERPL-MCNC: 302 PG/ML (ref 193–986)
WBC # BLD AUTO: 4.8 10E9/L (ref 4–11)

## 2017-03-03 PROCEDURE — 90853 GROUP PSYCHOTHERAPY: CPT

## 2017-03-03 PROCEDURE — 99223 1ST HOSP IP/OBS HIGH 75: CPT | Mod: AI | Performed by: NURSE PRACTITIONER

## 2017-03-03 PROCEDURE — 82306 VITAMIN D 25 HYDROXY: CPT | Performed by: NURSE PRACTITIONER

## 2017-03-03 PROCEDURE — 25000132 ZZH RX MED GY IP 250 OP 250 PS 637: Performed by: NURSE PRACTITIONER

## 2017-03-03 PROCEDURE — 82746 ASSAY OF FOLIC ACID SERUM: CPT | Performed by: NURSE PRACTITIONER

## 2017-03-03 PROCEDURE — 12400007 ZZH R&B MH INTERMEDIATE UMMC

## 2017-03-03 PROCEDURE — 99231 SBSQ HOSP IP/OBS SF/LOW 25: CPT | Performed by: PHYSICIAN ASSISTANT

## 2017-03-03 PROCEDURE — 80061 LIPID PANEL: CPT | Performed by: NURSE PRACTITIONER

## 2017-03-03 PROCEDURE — 36415 COLL VENOUS BLD VENIPUNCTURE: CPT | Performed by: NURSE PRACTITIONER

## 2017-03-03 PROCEDURE — 85025 COMPLETE CBC W/AUTO DIFF WBC: CPT | Performed by: NURSE PRACTITIONER

## 2017-03-03 PROCEDURE — 82607 VITAMIN B-12: CPT | Performed by: NURSE PRACTITIONER

## 2017-03-03 PROCEDURE — 80053 COMPREHEN METABOLIC PANEL: CPT | Performed by: NURSE PRACTITIONER

## 2017-03-03 PROCEDURE — 80320 DRUG SCREEN QUANTALCOHOLS: CPT | Performed by: NURSE PRACTITIONER

## 2017-03-03 PROCEDURE — 80307 DRUG TEST PRSMV CHEM ANLYZR: CPT | Performed by: NURSE PRACTITIONER

## 2017-03-03 PROCEDURE — 84443 ASSAY THYROID STIM HORMONE: CPT | Performed by: NURSE PRACTITIONER

## 2017-03-03 RX ORDER — BUPROPION HYDROCHLORIDE 300 MG/1
300 TABLET ORAL EVERY MORNING
Status: DISCONTINUED | OUTPATIENT
Start: 2017-03-04 | End: 2017-03-06

## 2017-03-03 RX ORDER — IBUPROFEN 200 MG
200 TABLET ORAL EVERY 6 HOURS PRN
Status: DISCONTINUED | OUTPATIENT
Start: 2017-03-03 | End: 2017-03-06 | Stop reason: HOSPADM

## 2017-03-03 RX ORDER — TOPIRAMATE 25 MG/1
25 TABLET, FILM COATED ORAL AT BEDTIME
Status: DISCONTINUED | OUTPATIENT
Start: 2017-03-03 | End: 2017-03-06

## 2017-03-03 RX ORDER — TRAZODONE HYDROCHLORIDE 50 MG/1
50 TABLET, FILM COATED ORAL AT BEDTIME
Status: DISCONTINUED | OUTPATIENT
Start: 2017-03-03 | End: 2017-03-03

## 2017-03-03 RX ORDER — OXYCODONE AND ACETAMINOPHEN 5; 325 MG/1; MG/1
TABLET ORAL
Qty: 90 TABLET | Refills: 0 | OUTPATIENT
Start: 2017-03-03

## 2017-03-03 RX ADMIN — GABAPENTIN 600 MG: 600 TABLET, FILM COATED ORAL at 20:16

## 2017-03-03 RX ADMIN — OXYCODONE HYDROCHLORIDE AND ACETAMINOPHEN 1 TABLET: 5; 325 TABLET ORAL at 08:47

## 2017-03-03 RX ADMIN — TOPIRAMATE 25 MG: 25 TABLET ORAL at 20:17

## 2017-03-03 RX ADMIN — BUPROPION HYDROCHLORIDE 150 MG: 150 TABLET, FILM COATED, EXTENDED RELEASE ORAL at 08:47

## 2017-03-03 RX ADMIN — GABAPENTIN 600 MG: 600 TABLET, FILM COATED ORAL at 14:00

## 2017-03-03 RX ADMIN — BUPROPION HYDROCHLORIDE 300 MG: 300 TABLET, FILM COATED, EXTENDED RELEASE ORAL at 08:47

## 2017-03-03 RX ADMIN — OXYCODONE HYDROCHLORIDE AND ACETAMINOPHEN 1 TABLET: 5; 325 TABLET ORAL at 20:16

## 2017-03-03 RX ADMIN — HYDROXYZINE HYDROCHLORIDE 50 MG: 25 TABLET ORAL at 20:16

## 2017-03-03 RX ADMIN — GABAPENTIN 600 MG: 600 TABLET, FILM COATED ORAL at 08:48

## 2017-03-03 RX ADMIN — ASPIRIN 81 MG: 81 TABLET, COATED ORAL at 08:47

## 2017-03-03 RX ADMIN — TRAZODONE HYDROCHLORIDE 50 MG: 50 TABLET ORAL at 23:47

## 2017-03-03 RX ADMIN — OXYCODONE HYDROCHLORIDE AND ACETAMINOPHEN 1 TABLET: 5; 325 TABLET ORAL at 14:00

## 2017-03-03 RX ADMIN — CLINDAMYCIN PHOSPHATE 1 G: 10 GEL TOPICAL at 20:16

## 2017-03-03 RX ADMIN — VITAMIN D, TAB 1000IU (100/BT) 2000 UNITS: 25 TAB at 08:47

## 2017-03-03 ASSESSMENT — ACTIVITIES OF DAILY LIVING (ADL)
GROOMING: INDEPENDENT
ORAL_HYGIENE: INDEPENDENT
ORAL_HYGIENE: INDEPENDENT
LAUNDRY: UNABLE TO COMPLETE
GROOMING: INDEPENDENT
DRESS: SCRUBS (BEHAVIORAL HEALTH)
DRESS: SCRUBS (BEHAVIORAL HEALTH)
LAUNDRY: UNABLE TO COMPLETE

## 2017-03-03 NOTE — PROGRESS NOTES
"Writer facilitated mental health education group. A life tree exercise was used to discuss life history, goals, supportive factors and things people do not want to inhabit their life any longer.  Pt participated in group exercise and discussion identifying \"family\" (children and aunts and uncles) as a positive influence in their life. PT said she and family have 1 family vacation every year together and family reunions every 2 years.   "

## 2017-03-03 NOTE — CONSULTS
"[facility]  Internal Medicine Consult     Jina Reeves MRN# [id number]   Age: 56 year old YOB: 1960     Date of Admission: 3/2/2017  Date of Consult:  3/3/2017    Requesting Service: Behavioral Health - Tonya Merritt MD         Reason for Consult:   General medical evaluation         Assessment and Recommendations:   Jina Reeves is a 56 year old female with a history of depression and chronic back pain who was admitted to [facility] station 3B with SI.    # SI, Depression, Anxiety.  Management per primary team, psychiatry.     Hypernatremia. Na 145 on admission. Admits to poor PO intake prior to admission due to current psychiatric state.   - Encourage PO intake    Back Pain. 2/2 injury sustained at work in 2013. Has been through physical therapy. No with chronic pain. Intermittent radiation of pain to LLE with some tingling.   - Continue percocet TID prn    HA. Frontal HA. Mild. No visual changes, photophobia or phonophobia.   - Tylenol or Ibuprofen prn    Internal Medicine will sign off at this time. Please notify if any intercurrent medical questions or concerns arise.          History of Present Illness:   Jina Reeves is a 56 year old female with a history of depression and chronic back pain who was admitted to [facility] station 3B with SI. She was transferred directly from clinic. Depression has been worsening over the past few months. States that she has also been drinking more than usual. States she recently has been drinking 3-5 drinks per night to \"numb the pain\". Last drink on Wednesday 3/1.   Endorses chronic lower back pain 2/2 accident at work in 2013. Has been through PT and evaluation by neurosurgery. States that she was advised to have surgery, but declined. Pain is well managed on Percocet tid. She endorses intermittent radiation of pain and tingling into her LLE.   Denies any cardiac or pulmonary history. Currently denies chest pain, sob, or difficulty " breathing. Endorses a mild HA. She normally experience headaches a couple of times per week, which are generally well controled with Aleve.   No other complaints at this time.          Review of Systems:   A 10 point review of systems was performed and is negative unless otherwise noted in HPI.          Past Medical History:     Past Medical History   Diagnosis Date     Anemia      Cocaine abuse, in remission      ten years since used     Glucose intolerance (pre-diabetes)      a1c6.1 prediabetes     NONSPECIFIC MEDICAL HISTORY      [id number]     NONSPECIFIC MEDICAL HISTORY      IUD in past removed for infection     Obesity, unspecified      Phthirus pubis (pubic louse)      20 yrs ago     Seasonal affective disorder (H)              Past Surgical History:      Past Surgical History   Procedure Laterality Date     Colonoscopy  10/29/2013     Procedure: COLONOSCOPY;  COLONOSCOPY-SCREENING / VITO;  Surgeon: Westley Larios MD;  Location: [facility] OR     Colonoscopy  10/29/2013     Procedure: COMBINED COLONOSCOPY, SINGLE BIOPSY/POLYPECTOMY BY BIOPSY;;  Surgeon: Westley Larios MD;  Location: [facility] OR             Family History:   Family history was reviewed.      Family History   Problem Relation Age of Onset     Cancer - colorectal Sister      age 38     CANCER No family hx of      DIABETES No family hx of      Hypertension No family hx of      CEREBROVASCULAR DISEASE No family hx of      Thyroid Disease No family hx of      Glaucoma No family hx of      Macular Degeneration No family hx of              Social History:     Social History   Substance Use Topics     Smoking status: Former Smoker     Smokeless tobacco: Never Used      Comment: quit  over 20 yrs ago     Alcohol use Yes             Medications:     No current facility-administered medications on file prior to encounter.   Current Outpatient Prescriptions on File Prior to Encounter:  oxyCODONE-acetaminophen (PERCOCET) 5-325 MG per tablet Take 1  tablet by mouth three times daily for back pain. Fill on or after 1/28/17.   hydrOXYzine (VISTARIL) 25 MG capsule Take 1 capsule (25 mg) by mouth 3 times daily as needed for anxiety   buPROPion (WELLBUTRIN XL) 300 MG 24 hr tablet TAKE ONE TABLET BY MOUTH EVERY DAY IN THE MORNING.   ASPIR-LOW 81 MG EC tablet TAKE ONE TABLET BY MOUTH ONE TIME DAILY   Cholecalciferol (VITAMIN D) 2000 UNITS tablet Take 2,000 Units by mouth daily   clindamycin (CLINDAMAX) 1 % gel Apply 1 g topically 2 times daily   gabapentin (NEURONTIN) 600 MG tablet Take 1 tablet (600 mg) by mouth 3 times daily   Ferrous Sulfate GRAN by Misc.(Non-Drug; Combo Route) route.   buPROPion (WELLBUTRIN XL) 150 MG 24 hr tablet Take 1 tablet (150 mg) by mouth every morning   DIFFERIN 0.1 % cream Apply  topically At Bedtime. ANGELO.Will replace Rx for adapalene (DIFFERIN) 0.1 % gel   ORDER FOR DME Equipment being ordered: thumb spica wrist brace   polyethylene glycol (MIRALAX) powder Take 17 g by mouth daily as needed.   STATIN NOT PRESCRIBED, INTENTIONAL, by Other route continuous prn.   ACE NOT PRESCRIBED, INTENTIONAL, by Other route continuous prn.   ORDER FOR DME one light box for seasonal affective disorder       Current Facility-Administered Medications   Medication     aspirin EC EC tablet 81 mg     buPROPion (WELLBUTRIN XL) 24 hr tablet 150 mg     buPROPion (WELLBUTRIN XL) 24 hr tablet 300 mg     cholecalciferol (vitamin D) tablet 2,000 Units     clindamycin (CLINDAMAX) 1 % topical gel 1 g     adapalene (DIFFERIN) 0.1 % gel     gabapentin (NEURONTIN) tablet 600 mg     oxyCODONE-acetaminophen (PERCOCET) 5-325 MG per tablet 1 tablet     polyethylene glycol (MIRALAX/GLYCOLAX) Packet 17 g     hydrOXYzine (ATARAX) tablet 25-50 mg     acetaminophen (TYLENOL) tablet 650 mg     alum & mag hydroxide-simethicone (MYLANTA ES/MAALOX  ES) suspension 30 mL     magnesium hydroxide (MILK OF MAGNESIA) suspension 30 mL     bisacodyl (DULCOLAX) Suppository 10 mg     traZODone  "(DESYREL) tablet 50 mg     OLANZapine (zyPREXA) tablet 5 mg    Or     OLANZapine (zyPREXA) injection 5 mg     naloxone (NARCAN) injection 0.1-0.4 mg            Allergies:     Allergies   Allergen Reactions     Vicodin [Hydrocodone-Acetaminophen] Itching             Physical Exam:   BP (!) 155/97  Pulse 78  Temp 97.7  F (36.5  C) (Tympanic)  Resp 16  Ht 1.753 m (5' 9\")  Wt 101.8 kg (224 lb 6.4 oz)  LMP 06/05/2013  BMI 33.14 kg/m2   GENERAL: Alert and oriented x 3. NAD.   HEENT: Anicteric sclera. PERRL. Mucous membranes moist.   CV: RRR. S1, S2. No murmurs appreciated.   RESPIRATORY: Effort normal. Lungs CTAB with no wheezing, rales, rhonchi.   GI: Abdomen soft and non distended with normoactive bowel sounds present in all quadrants. No tenderness, rebound, guarding.   MUSCULOSKELETAL: No joint swelling or tenderness.   NEUROLOGICAL: No focal deficits. Moves all extremities.   EXTREMITIES: No peripheral edema. Intact bilateral pedal pulses.   SKIN: No jaundice. No rashes.          Labs:   CBC:  Recent Labs   Lab Test  03/03/17   0701   WBC  4.8   RBC  4.14   HGB  12.4   HCT  38.3   MCV  93   MCH  30.0   MCHC  32.4   RDW  13.5   PLT  241       CMP:  Recent Labs   Lab Test  03/03/17   0701   NA  145*   POTASSIUM  3.8   CHLORIDE  112*   VIRGINIE  9.0   CO2  26   BUN  9   CR  0.59   GLC  84   AST  20   ALT  20   BILITOTAL  0.7   ALBUMIN  3.0*   PROTTOTAL  6.1*   ALKPHOS  67       TSH:  TSH   Date Value Ref Range Status   03/03/2017 0.92 0.40 - 4.00 mU/L Final               Jayne Carrillo PA-C  Hospitalist Service  [phone]             "

## 2017-03-03 NOTE — PLAN OF CARE
Problem: General Plan of Care (Inpatient Behavioral)  Goal: Team Discussion  Team Plan:   BEHAVIORAL TEAM DISCUSSION     Continued Stay Criteria/Rationale: SI with plan to drink antifreeze.  Plan: Psychiatric Assessment. Medication Management. Therapeutic Mileu. Individual and group support.  Participants: 3B Joanna Gaines,[facility], Yesenia Tavarez, OT, Margaux Merlos DNP and Mary Santoyo RN.      Summary/Recommendation: PT put on 72 hour hold and admitted directly from [facility] Psychiatry clinic after pt made statements of SI with plan to drink anti freeze to provider at clinic. 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  Monitor and assess for safety.      Medical/Physical: See H & P.     Progress: Initial.

## 2017-03-03 NOTE — PLAN OF CARE
Problem: General Plan of Care (Inpatient Behavioral)  Goal: Team Discussion  Team Plan:   BEHAVIORAL TEAM DISCUSSION     Continued Stay Criteria/Rationale: SI with plan to drink antifreeze.  Plan: Psychiatric Assessment. Medication Management. Therapeutic Mileu. Individual and group support.  Participants: 3B Joanna Gaines,[facility], Yesenia Tavarez, OT, Margaux Merlos DNP and Mary Santoyo RN.       Summary/Recommendation: PT put on 72 hour hold and admitted directly from [facility] Psychiatry clinic after pt made statements of SI with plan to drink anti freeze to provider at clinic. 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 Monitor and assess for safety.       Medical/Physical: See H & P.      Progress: Initial.

## 2017-03-03 NOTE — PROGRESS NOTES
03/03/17 1445   Behavioral Health   Hallucinations denies / not responding to hallucinations   Thinking intact   Orientation person: oriented;place: oriented   Memory baseline memory   Insight denial of illness;insight appropriate to situation;insight appropriate to events   Judgement impaired   Eye Contact at examiner;at floor   Affect full range affect   Mood depressed;mood is calm   Physical Appearance/Attire disheveled   Hygiene other (see comment)  (adequate )   Suicidality other (see comments)  (none stated (DEBBI))   Self Injury other (see comment)  (none stated/observed)   Activity other (see comment);withdrawn;isolative  (attended afternoon group)   Speech clear;coherent;pressured;other (see comments)  (pt appears withholding)   Medication Sensitivity no stated side effects;no observed side effects   Psychomotor / Gait balanced;steady   Safety   Suicidality status 15   Coping/Psychosocial   Verbalized Emotional State other (see comments)  (nothing stated)   Plan Of Care Reviewed With patient   Psycho Education   Type of Intervention structured groups   Response refuses   Hours 0.5   Treatment Detail (taking action)   Group Therapy Session   Group Attendance other (see comments)  (attended afternoon group)   Activities of Daily Living   Hygiene/Grooming independent   Oral Hygiene independent   Dress scrubs (behavioral health)   Laundry unable to complete   Room Organization independent   Activity   Activity Level of Assistance independent   Behavioral Health Interventions   Depression maintain safety precautions;maintain safe secure environment;encourage nutrition and hydration;encourage participation / independence with adls;provide emotional support;establish therapeutic relationship;assist with developing and utilizing healthy coping strategies;build upon strengths;monitor need for prn medication;monitor confusion, memory loss, decision making ability and reorient / intervene as needed   Social and  Therapeutic Interventions (Depression) encourage socialization with peers;encourage effective boundaries with peers;encourage participation in therapeutic groups and milieu activities     Pt was in bed most of morning and got up to speak with CTC and psychiatrist. Pt ate breakfast in lounge and laid back down in room. DEBBI SI/SIB pt not answering when asked. Pt affect full range. Pt attended afternoon group and appeared more engaged with tx.
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
## 2017-03-03 NOTE — PROGRESS NOTES
"Pt direct admit from psych clinic, Pt placed on 72 hour hold due to suicidal idea and plan to drink antifreeze. Pt reported to writer that she was not going to \"do any of those things\". Pt states that she \"doesn't want to be here, it will make my situation worse. My friend is letting me use her car and if I don't bring it back she'll be mad and won't let me stay with her... I'll be right back where I was... Selling my body\". Pt reports being homeless. Pt was orientated to unit. The car the Pt drove here is in the [place] and tracy has the keys. PT was calm and cooperative during shift.   "

## 2017-03-03 NOTE — PLAN OF CARE
Problem: General Plan of Care (Inpatient Behavioral)  Goal: Individualization/Patient Specific Goal (IP Behavioral)  The patient and/or their representative will achieve their patient-specific goals related to the plan of care.    The patient-specific goals include:    Illness Management Recovery model: Objectives    Patient will identify reason(s) for hospitalization from their perspective.  Patient will identify a minimum of three goals for discharge.  Patient will identify a minimum of three triggers that may increase their symptoms.  Patient will identify a minimum of three coping skills they can do to stay well.   Patient will identify their support system to demonstrate readiness for discharge.   Illness Management Recovery model:  Taking Action.     Patient identified the following actions they can take when early warning signs are present in order to prevent relapse:     1. sleep

## 2017-03-03 NOTE — PROGRESS NOTES
[facility] Date: 17  [employer] Query Report Page#: 1   Patient Rx History Report  KY PETERSEN  Search Criteria: Last Name 'Ky' and First Name 'Jina' and  = '[birth year]' and Request Period = '  to ' - 1 out of 1 Recipients Selected.    Fill Date Product, Str, Form Qty Days Pt ID Prescriber Written RX# N/R* Pharm MED+  ---------- -------------------------------- ------ ---- --------- ---------- ---------- ------------ ----- --------- ------  2017 OXYCODONE-ACETAMINOPHEN 5-325 90.00 30 [id number] 2017 [id number] N [id number] 22.5  2016 OXYCODONE-ACETAMINOPHEN 5-325 90.00 30 [id number] 2016 [id number] N [id number] 22.5  2016 OXYCODONE-ACETAMINOPHEN 5-325 90.00 30 [id number] 2016 [id number] N [id number] 22.5  10/31/2016 OXYCODONE-ACETAMINOPHEN 5-325 90.00 30 [id number] 10/24/2016 [id number] N [id number] 22.5  2016 OXYCODONE-ACETAMINOPHEN 5-325 90.00 30 [id number] 2016 [id number] N [id number] 22.5  2016 OXYCODONE-ACETAMINOPHEN 5-325 90.00 30 [id number] 2016 [id number] N [id number] 22.5  2016 OXYCODONE-ACETAMINOPHEN 5-325 90.00 30 [id number] 2016 [id number] N [id number] 22.5  2016 OXYCODONE-ACETAMINOPHEN 5-325 90.00 30 [id number] 2016 [id number] N [id number] 22.5  2016 OXYCODONE-ACETAMINOPHEN 5-325 90.00 30 [id number] 2016 [id number] N [id number] 22.5  2016 CHERATUSSIN AC SYRUP 120.00 2 [id number] 2016 [id number] N [id number] 00.0  2016 OXYCODONE-ACETAMINOPHEN 5-325 90.00 30 [id number] 2016 [id number] N [id number] 22.5  2016 OXYCODONE-ACETAMINOPHEN 5-325 90.00 30 [id number] 2016 [id number] N [id number] 22.5  2016 OXYCODONE-ACETAMINOPHEN 5-325 90.00 30 [id number] 2016 [id number] N [id number] 22.5    *N/R N=New R=Refill  +MED Daily    Prescribers for prescriptions  listed  ----------------------------------------------------------------------------------------------------------------------------------  [id number] CHULA PADRON RIA (PA-C); [facility], [address] [place], [place] [id number]  [id number] GUSTABO BUCKNER MD; [address] [place], [place] [id number]  [id number] MERA PEREZ MD; [address] [place], [place] [id number]    Pharmacies that dispensed prescriptions listed  ----------------------------------------------------------------------------------------------------------------------------------  [id number] [id number], [address] [facility] [facility] [place] [address],    Patients that match search criteria  ----------------------------------------------------------------------------------------------------------------------------------  [id number] ANTHONY GUNDERSON [birth year]; [address] [facility] [address]    MED Summary  This section displays cumulative MED values by unique recipient. The MED Max value is the maximum occurrence of cumulative MED  sustained for any 3 consecutive days. This value is calculated based on prescriptions dispensed during the date range requested.  -----------------------------------------------------------------------------------------------------------------------------------  22.5 NICOLA GR; 1960; [address] [facility] [address]

## 2017-03-03 NOTE — PROGRESS NOTES
Patient slept the whole shift. Was maintained on NPO per protocol since midnight as she will have fasting labs today. Urine specimen needed for Utox.

## 2017-03-03 NOTE — PROGRESS NOTES
"Pt up in bathroom but not able to provide Utox but reminded. Pt states her mood is \" OK\". Did not want to come out for breakfast but eventually did and ate well. Denies suicidal ideation. Affect is flat.  "

## 2017-03-04 PROCEDURE — 25000132 ZZH RX MED GY IP 250 OP 250 PS 637: Performed by: NURSE PRACTITIONER

## 2017-03-04 PROCEDURE — 12400007 ZZH R&B MH INTERMEDIATE UMMC

## 2017-03-04 RX ADMIN — VITAMIN D, TAB 1000IU (100/BT) 2000 UNITS: 25 TAB at 08:51

## 2017-03-04 RX ADMIN — ASPIRIN 81 MG: 81 TABLET, COATED ORAL at 08:51

## 2017-03-04 RX ADMIN — CLINDAMYCIN PHOSPHATE 1 G: 10 GEL TOPICAL at 08:55

## 2017-03-04 RX ADMIN — GABAPENTIN 600 MG: 600 TABLET, FILM COATED ORAL at 08:51

## 2017-03-04 RX ADMIN — GABAPENTIN 600 MG: 600 TABLET, FILM COATED ORAL at 20:17

## 2017-03-04 RX ADMIN — TOPIRAMATE 25 MG: 25 TABLET ORAL at 20:17

## 2017-03-04 RX ADMIN — GABAPENTIN 600 MG: 600 TABLET, FILM COATED ORAL at 13:52

## 2017-03-04 RX ADMIN — TRAZODONE HYDROCHLORIDE 50 MG: 50 TABLET ORAL at 02:23

## 2017-03-04 RX ADMIN — BUPROPION HYDROCHLORIDE 300 MG: 300 TABLET, FILM COATED, EXTENDED RELEASE ORAL at 08:52

## 2017-03-04 RX ADMIN — OXYCODONE HYDROCHLORIDE AND ACETAMINOPHEN 1 TABLET: 5; 325 TABLET ORAL at 20:17

## 2017-03-04 RX ADMIN — OXYCODONE HYDROCHLORIDE AND ACETAMINOPHEN 1 TABLET: 5; 325 TABLET ORAL at 13:52

## 2017-03-04 RX ADMIN — HYDROXYZINE HYDROCHLORIDE 50 MG: 25 TABLET ORAL at 20:21

## 2017-03-04 RX ADMIN — OXYCODONE HYDROCHLORIDE AND ACETAMINOPHEN 1 TABLET: 5; 325 TABLET ORAL at 08:52

## 2017-03-04 ASSESSMENT — ACTIVITIES OF DAILY LIVING (ADL)
LAUNDRY: UNABLE TO COMPLETE
ORAL_HYGIENE: INDEPENDENT
GROOMING: INDEPENDENT
ORAL_HYGIENE: INDEPENDENT
DRESS: INDEPENDENT
DRESS: SCRUBS (BEHAVIORAL HEALTH);INDEPENDENT
HYGIENE/GROOMING: INDEPENDENT

## 2017-03-04 ASSESSMENT — PATIENT HEALTH QUESTIONNAIRE - PHQ9: SUM OF ALL RESPONSES TO PHQ QUESTIONS 1-9: 27

## 2017-03-04 NOTE — PLAN OF CARE
Problem: Depressive Symptoms  Goal: Depressive Symptoms  Signs and symptoms of listed problems will be absent or manageable.    Pt will report sleeping 7 hours a night  Pt will be free from SI/SIB  Pt will be medication compliant %100 of time   Outcome: Improving  Pt reports sleeping 7 hours last night, denies SI/SIB. Pt has been 100% med compliant.

## 2017-03-04 NOTE — PROGRESS NOTES
Pt was visible in milieu most of shift, attended community meeting and participated appropriately.  Pt affect appears blunt/flat, withdrawn but is pleasant and cooperative on approach.  Pt endorsed auditory hallucinations, passive suicidal thoughts with no plan (while in hospital), depression and anxiety both 6/10.  Pt is insightful into illness, and stated wanted to feel better and improve while inpatient, in effort to avoid acting on any suicidal or chemical dependency behaviors/habits.  Pt expressed really wanting to work with  for placement/planning after discharge     03/04/17 1435   Behavioral Health   Hallucinations auditory   Thinking intact   Orientation person: oriented;place: oriented;date: oriented;time: oriented   Memory baseline memory   Insight admits / accepts;insight appropriate to situation;insight appropriate to events   Judgement intact   Eye Contact at examiner   Affect blunted, flat   Mood depressed;mood is calm   Physical Appearance/Attire untidy;appears stated age;attire appropriate to age and situation   Hygiene other (see comment)  (Adequate)   Suicidality thoughts only;other (see comments)  (Passive thoughts, no plan)   Self Injury plan;other (see comment)  (Passive thoughts, no plan)   Activity withdrawn;other (see comment)  (Visible in milieu)   Speech clear;coherent;other (see comments)  (Slow, quiet)   Psychomotor / Gait slow;balanced;steady   Sleep/Rest/Relaxation   Day/Evening Time Hours up all shift   Safety   Suicidality status 15   Coping/Psychosocial   Verbalized Emotional State anxiety;depression;sadness;suicidal thoughts   Psycho Education   Type of Intervention 1:1 intervention   Response participates, initiates socially appropriate   Hours (10)   Treatment Detail mh check in   Activities of Daily Living   Hygiene/Grooming independent   Oral Hygiene independent   Dress scrubs (behavioral health);independent   Room Organization independent   Activity   Activity  Level of Assistance independent

## 2017-03-04 NOTE — PROGRESS NOTES
"Pt calm during shift. PT stats \"I am doing better\", she reported that she is feeling better about being in the hospital and hopes to be able to get help \"so I don't go back to my old lifestyle\". Denies SI/SIB. Pt reports \"having voices. A good one Malia who is helping me to take better care of myself and Chocolate who is telling me 'come out lets get out of here, it is Friday, we could be making a lot of money'. I have not been listening to Chocolate today\". Pt reports that reading the Bible helps quite Chocolate. Pt reported she has goals of going back to school and getting her own place.   Topamax started tonight, pt given handout and questions about medication answered by writer, pt verbalized understanding. Will continue to monitor.       "

## 2017-03-05 PROCEDURE — 25000132 ZZH RX MED GY IP 250 OP 250 PS 637: Performed by: NURSE PRACTITIONER

## 2017-03-05 PROCEDURE — 90853 GROUP PSYCHOTHERAPY: CPT

## 2017-03-05 PROCEDURE — 12400007 ZZH R&B MH INTERMEDIATE UMMC

## 2017-03-05 PROCEDURE — 25000132 ZZH RX MED GY IP 250 OP 250 PS 637: Performed by: PHYSICIAN ASSISTANT

## 2017-03-05 RX ADMIN — OXYCODONE HYDROCHLORIDE AND ACETAMINOPHEN 1 TABLET: 5; 325 TABLET ORAL at 20:22

## 2017-03-05 RX ADMIN — OXYCODONE HYDROCHLORIDE AND ACETAMINOPHEN 1 TABLET: 5; 325 TABLET ORAL at 08:40

## 2017-03-05 RX ADMIN — OXYCODONE HYDROCHLORIDE AND ACETAMINOPHEN 1 TABLET: 5; 325 TABLET ORAL at 14:05

## 2017-03-05 RX ADMIN — GABAPENTIN 600 MG: 600 TABLET, FILM COATED ORAL at 20:22

## 2017-03-05 RX ADMIN — TRAZODONE HYDROCHLORIDE 50 MG: 50 TABLET ORAL at 20:22

## 2017-03-05 RX ADMIN — VITAMIN D, TAB 1000IU (100/BT) 2000 UNITS: 25 TAB at 08:40

## 2017-03-05 RX ADMIN — ASPIRIN 81 MG: 81 TABLET, COATED ORAL at 08:41

## 2017-03-05 RX ADMIN — GABAPENTIN 600 MG: 600 TABLET, FILM COATED ORAL at 08:39

## 2017-03-05 RX ADMIN — BUPROPION HYDROCHLORIDE 300 MG: 300 TABLET, FILM COATED, EXTENDED RELEASE ORAL at 08:40

## 2017-03-05 RX ADMIN — HYDROXYZINE HYDROCHLORIDE 50 MG: 25 TABLET ORAL at 17:48

## 2017-03-05 RX ADMIN — TOPIRAMATE 25 MG: 25 TABLET ORAL at 20:22

## 2017-03-05 RX ADMIN — IBUPROFEN 200 MG: 200 TABLET, FILM COATED ORAL at 17:48

## 2017-03-05 RX ADMIN — GABAPENTIN 600 MG: 600 TABLET, FILM COATED ORAL at 14:05

## 2017-03-05 RX ADMIN — HYDROXYZINE HYDROCHLORIDE 50 MG: 25 TABLET ORAL at 08:44

## 2017-03-05 ASSESSMENT — ACTIVITIES OF DAILY LIVING (ADL)
ORAL_HYGIENE: INDEPENDENT
GROOMING: INDEPENDENT
DRESS: SCRUBS (BEHAVIORAL HEALTH)

## 2017-03-05 NOTE — PROGRESS NOTES
Pt was present in the milieu for group and dinner, after that pt was isolative to her room. Pt is pleasant on approached,  cooperative, and somewhat social  with selected peers,  Pt reported high anxiety and got medication for it. Pt states, she is doing better and rated her mood at 5/10.

## 2017-03-05 NOTE — PROGRESS NOTES
Patient pleasant, cooperative and med compliant. Depression rated 8/10 anxiety 7/10. Given PRN hydraxizine 50mg at 8:30am. Complained of back pain rated 4-7/10, has scheduled pain medications. Denies SI and SIB. Denies auditory or visual hallucinations. States she slept well last night. Ate breakfast and lunch. Attended and participated in group.

## 2017-03-05 NOTE — PLAN OF CARE
Problem: General Plan of Care (Inpatient Behavioral)  Goal: Individualization/Patient Specific Goal (IP Behavioral)  The patient and/or their representative will achieve their patient-specific goals related to the plan of care.    The patient-specific goals include:    Illness Management Recovery model: Objectives    Patient will identify reason(s) for hospitalization from their perspective.  Patient will identify a minimum of three goals for discharge.  Patient will identify a minimum of three triggers that may increase their symptoms.  Patient will identify a minimum of three coping skills they can do to stay well.   Patient will identify their support system to demonstrate readiness for discharge.   Illness Management Recovery model:  Ways to [place].     Patient identified the following specific strategies to cope with their symptoms:     1. Listen to Yarsanism music  2.  [place]  3.

## 2017-03-06 ENCOUNTER — TELEPHONE (OUTPATIENT)
Dept: FAMILY MEDICINE | Facility: CLINIC | Age: 57
End: 2017-03-06

## 2017-03-06 VITALS
SYSTOLIC BLOOD PRESSURE: 135 MMHG | HEIGHT: 69 IN | BODY MASS INDEX: 33 KG/M2 | DIASTOLIC BLOOD PRESSURE: 85 MMHG | TEMPERATURE: 97.5 F | WEIGHT: 222.8 LBS | HEART RATE: 79 BPM | RESPIRATION RATE: 16 BRPM

## 2017-03-06 DIAGNOSIS — M48.061 STENOSIS, SPINAL, LUMBAR: ICD-10-CM

## 2017-03-06 DIAGNOSIS — M47.816 FACET ARTHROPATHY, LUMBAR: ICD-10-CM

## 2017-03-06 DIAGNOSIS — G89.4 CHRONIC PAIN SYNDROME: ICD-10-CM

## 2017-03-06 LAB
ANION GAP SERPL CALCULATED.3IONS-SCNC: 6 MMOL/L (ref 3–14)
BUN SERPL-MCNC: 11 MG/DL (ref 7–30)
CALCIUM SERPL-MCNC: 9.8 MG/DL (ref 8.5–10.1)
CHLORIDE SERPL-SCNC: 112 MMOL/L (ref 94–109)
CO2 SERPL-SCNC: 25 MMOL/L (ref 20–32)
CREAT SERPL-MCNC: 0.78 MG/DL (ref 0.52–1.04)
GFR SERPL CREATININE-BSD FRML MDRD: 76 ML/MIN/1.7M2
GLUCOSE SERPL-MCNC: 109 MG/DL (ref 70–99)
POTASSIUM SERPL-SCNC: 4.2 MMOL/L (ref 3.4–5.3)
SODIUM SERPL-SCNC: 143 MMOL/L (ref 133–144)

## 2017-03-06 PROCEDURE — 36415 COLL VENOUS BLD VENIPUNCTURE: CPT | Performed by: NURSE PRACTITIONER

## 2017-03-06 PROCEDURE — 80048 BASIC METABOLIC PNL TOTAL CA: CPT | Performed by: NURSE PRACTITIONER

## 2017-03-06 PROCEDURE — 90853 GROUP PSYCHOTHERAPY: CPT

## 2017-03-06 PROCEDURE — 99238 HOSP IP/OBS DSCHRG MGMT 30/<: CPT | Performed by: NURSE PRACTITIONER

## 2017-03-06 PROCEDURE — 25000132 ZZH RX MED GY IP 250 OP 250 PS 637: Performed by: NURSE PRACTITIONER

## 2017-03-06 PROCEDURE — 97150 GROUP THERAPEUTIC PROCEDURES: CPT | Mod: GO

## 2017-03-06 PROCEDURE — 25000132 ZZH RX MED GY IP 250 OP 250 PS 637: Performed by: PHYSICIAN ASSISTANT

## 2017-03-06 RX ORDER — BUPROPION HYDROCHLORIDE 150 MG/1
150 TABLET ORAL EVERY MORNING
Status: DISCONTINUED | OUTPATIENT
Start: 2017-03-06 | End: 2017-03-06 | Stop reason: HOSPADM

## 2017-03-06 RX ORDER — TOPIRAMATE 25 MG/1
25 TABLET, FILM COATED ORAL 2 TIMES DAILY
Status: DISCONTINUED | OUTPATIENT
Start: 2017-03-06 | End: 2017-03-06 | Stop reason: HOSPADM

## 2017-03-06 RX ORDER — BUPROPION HYDROCHLORIDE 150 MG/1
150 TABLET ORAL EVERY MORNING
Qty: 30 TABLET | Refills: 0 | Status: SHIPPED
Start: 2017-03-06 | End: 2017-03-20

## 2017-03-06 RX ORDER — TOPIRAMATE 25 MG/1
25 TABLET, FILM COATED ORAL 2 TIMES DAILY
Qty: 60 TABLET | Refills: 0 | Status: SHIPPED
Start: 2017-03-06 | End: 2017-03-20

## 2017-03-06 RX ADMIN — HYDROXYZINE HYDROCHLORIDE 50 MG: 25 TABLET ORAL at 01:17

## 2017-03-06 RX ADMIN — BUPROPION HYDROCHLORIDE 150 MG: 150 TABLET, FILM COATED, EXTENDED RELEASE ORAL at 08:42

## 2017-03-06 RX ADMIN — GABAPENTIN 600 MG: 600 TABLET, FILM COATED ORAL at 08:42

## 2017-03-06 RX ADMIN — VITAMIN D, TAB 1000IU (100/BT) 2000 UNITS: 25 TAB at 08:42

## 2017-03-06 RX ADMIN — TOPIRAMATE 25 MG: 25 TABLET ORAL at 08:42

## 2017-03-06 RX ADMIN — OXYCODONE HYDROCHLORIDE AND ACETAMINOPHEN 1 TABLET: 5; 325 TABLET ORAL at 13:16

## 2017-03-06 RX ADMIN — CLINDAMYCIN PHOSPHATE 1 G: 10 GEL TOPICAL at 08:42

## 2017-03-06 RX ADMIN — TRAZODONE HYDROCHLORIDE 50 MG: 50 TABLET ORAL at 01:17

## 2017-03-06 RX ADMIN — OXYCODONE HYDROCHLORIDE AND ACETAMINOPHEN 1 TABLET: 5; 325 TABLET ORAL at 08:42

## 2017-03-06 RX ADMIN — ASPIRIN 81 MG: 81 TABLET, COATED ORAL at 08:42

## 2017-03-06 RX ADMIN — GABAPENTIN 600 MG: 600 TABLET, FILM COATED ORAL at 13:16

## 2017-03-06 RX ADMIN — IBUPROFEN 200 MG: 200 TABLET, FILM COATED ORAL at 01:17

## 2017-03-06 ASSESSMENT — ACTIVITIES OF DAILY LIVING (ADL)
ORAL_HYGIENE: INDEPENDENT
GROOMING: INDEPENDENT
DRESS: STREET CLOTHES

## 2017-03-06 NOTE — DISCHARGE INSTRUCTIONS
Behavioral Discharge Planning and Instructions      Summary:  You were admitted on 3/2/2017  For Suicidal Ideations.  You were treated by Margaux Merlos DNP and discharged on 03/06/17 from [facility].    Main Diagnosis:   Major Depressive Disorder, recurrent, severe, with psychotic future    Health Care Follow-up Appointments:     See Appointment scheduled at [facility] and  [facility] above.    Attend all scheduled appointments with your outpatient providers. Call at least 24 hours in advance if you need to reschedule an appointment to ensure continued access to your outpatient providers.   Major Treatments, Procedures and Findings:  You were provided with: a psychiatric assessment, assessed for medical stability, medication evaluation and/or management, group therapy and milieu management    Symptoms to Report: feeling more aggressive, increased confusion, losing more sleep, mood getting worse or thoughts of suicide    Early warning signs can include: increased depression or anxiety sleep disturbances increased thoughts or behaviors of suicide or self-harm  increased unusual thinking, such as paranoia or hearing voices    Safety and Wellness:  Take all medicines as directed.  Make no changes unless your doctor suggests them.      Follow treatment recommendations.  Refrain from alcohol and non-prescribed drugs.  If there is a concern for safety, call 911.    Resources:   Crisis Intervention: 687.709.8693 or 920-176-6876 (TTY: 972.700.9005).  Call anytime for help.  National Jacob on Mental Illness (www.mn.jamar.org): 752.950.3419 or 418-254-5662.  Alcoholics Anonymous (www.alcoholics-anonymous.org): Check your phone book for your local chapter.  Suicide Awareness Voices of Education (SAVE) (www.save.org): 198-826-SAVE (7283)  Mental Health Association of [place] (www.mentalhealth.org): 512.929.1395 or 287-764-6215  [facility] Crisis (COPE) Response - Adult 685 623-1520  Text 4 Life: txt  "\"LIFE\" to 81755 for immediate support and crisis intervention  Crisis text line: Text \"START\" to 079-673. Free, confidential, 24/7.  Crisis Intervention: 110.442.8418 or 301-632-0988. Call anytime for help.     The treatment team has appreciated the opportunity to work with you.     If you have any questions or concerns our unit number is [phone].  You may be receiving a follow-up phone call within the next three days from a representative from behavioral health.    You have identified the best phone number to reach you as [phone] .  "

## 2017-03-06 NOTE — TELEPHONE ENCOUNTER
Reason for Call:  Medication or medication refill:    Do you use a [facility]?  Name of the pharmacy and phone number for the current request:      Name of the medication requested: PERCOCET    Other request:   AT BA    Can we leave a detailed message on this number? YES    Phone number patient can be reached at: Home number on file [phone] (home)    Best Time: ANY    Call taken on 3/6/2017 at 2:27 PM by Marlin Silveira

## 2017-03-06 NOTE — PROGRESS NOTES
Pt has been in her room except for dinner, at that time was social with peers but was focused on discussion of pain meds. Denies suicidal thoughts, no reports of auditory hallucinations. Requested and received prn hydroxyzine at dinner.

## 2017-03-06 NOTE — PROGRESS NOTES
Patient was sleeping at the start of the shift and woke up @ 0030. She was having trouble getting back to sleep as she was having a lot in her mind. She also complained of lower back pain and requested for Ibuprofen. Ibuprofen 200 mg. given @ 0117 PRN for pain. Trazodone 50 mg. given as a repeat dose @ 0117. Vistaril 50 mg. also given at this time per patient's request. Patient noted to be sleeping half an hour later. Slept a total of 5 hours.

## 2017-03-06 NOTE — PROGRESS NOTES
"Social Work Referral Response  [facility]    Data/Intervention:  Patient Name:  Jina Reeves  /Age:  1960 (56 year old)    Referral Source: HALIE Amezquita   Reason for Referral:  IPV screening and safety concerns    Collaborated With:    -Psychiatry, HALIE Amezquita  -Patient, Jina    Present during office visit with provider Olive Akins. Please see Olive's note for appointment details. Met with patient individually following this visit to discuss specific resources. Patient endorsed suicidal ideation and when asked whether she had a plan, reported, \"This time I'm going to drink some antifreeze. Once I get some, I'm going to do it.\" Writer identified safety concerns and consulted with provider who initiated inpatient admission process. Reviewed next steps and plan with patient. Remained with patient until security arrived and patient could be escorted to the inpatient unit.    Resources Provided:  Provided patient with housing resources, crisis residence referrals, and shelters.    Assessment:  Patient was engaged and endorsed symptoms consistent with depression and suicidal ideation with intent, plan, and means to carry out plan. Patient became tearful at times. Patient appeared open to social work visit and assistance.    Plan:  Patient will be evaluated by provider for inpatient admission.    Will route to patient's psychiatric provider(s) as an FYI.     Please call or page if needs or concerns arise.     IRENE Sorto, RODRICK  Direct Phone: [phone]  Pager: [phone]    "

## 2017-03-06 NOTE — DISCHARGE SUMMARY
"Psychiatric Discharge Summary    Jina Reeves MRN# [id number]   Age: 56 year old YOB: 1960     Date of Admission:  3/2/2017  Date of Discharge:  3/6/2017  Admitting Physician:  Tonya Merritt MD  Discharge Physician:  HALIE Montiel CNP (Contact: [phone])         Event Leading to Hospitalization:   Jina Reeves is a 56 year old female admited for worsening depression and anxiety. Ms. Reeves was admitted directly from her psychiatrist coffee after she admitted of feeling suicidal with a plan to kill herself by drinking antifreeze. She was not able to contract for safety and was placed on 72 hours hold. Ms. Reeves was first diagnosed with depression about 5 years ago. She was not able to identify any particular trigger at the time. States she has been dealing with depression most of her life which started at age 14-15, when her father killed her mother. She was started on Paxil which she did not tolerate. States the only other medication she has been on is Wellbutrin which was working well. She was on Trazodone for a while for sleep and was working well but she is not sure why she stopped it. She has been using cocaine, marijuana and alcohol \"to cope\". States she stopped using cocaine and marijuana about 2 years ago but is still drinking alcohol \"when available\". Last alcohol use was on Wednesday, had \"couple of cocktails\".   Ms. Reeves is a poor historian and somewhat inconsistent. She was not able to tell me when the current episode of depression started, believes she has been depressed since her back injury at work 2 years ago. 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 She was living with one of her daughters until [place] of [place]. Since then Ms. Reeves has been living with friends. States she was initially living with her friend and her  but he " "started coming to her at [facility] wanting to have a sexual relationship. Ms. Reeves did not feel comfortable and left. She then moved with a male friend who \"once he found out what I do he started bringing clients to me\". Ms. Reeves states she felt very tired because she wasn't sleeping and felt disgusted of what she does. States she wasn't proud of how she made a living but working all the time made her feel a lot of shame and guild. She is feeling more depressed, sleeps 3-4 hours, has low energy and motivation, increased appetite- gained 25 lbs since January, feels hopeless, helpless and worthless. She feels anxious, unable to relax, having racing thoughts and rumination especially at night.   Ms. Reeves states she is hearing voices and seeing two women. One she calls Juana who is \"the mean one that push me to sell my body\". Chocolate is dark and skiny. She states she has been seeing her for about two years. The other person she sees and hear is called Malia \"she is good to me and pushed me to go to the hospital for help\". Malia has brown skin, she is short and chubby\". She has seen Malia for few months now. Ms. Reeves is somewhat inconsistent with her description and timelines of these two personalities. She started talking to one of them during the interview saying \"Shut up bitch, I know I need to get out of here and work\".   Denies panic attacks, homicidal ideation, self injuries behaviors, memory problems, obsessions, compulsions, and specific fears. Denies manic symptoms.      Ms. 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 She continues to have thoughts of killing herself but not with antifreeze \"this might be a painful ways to go\". She insist on being discharged and is not willing to stay voluntary. She has one sister that she can live with in the [facility] but \"she is very OCD and I don't want to live with " "her\". She has a female friend she can stay with if she come up with $200 for a rent.        See Admission note by No att. providers found on 3/3/17 for additional details.          DIagnoses:     1. Major Depressive Disorder, recurrent, severe, with psychotic future  2. R/O Substance Induced Mood Disorder  3. Alcohol Use Disorder, moderate  4. Cocaine Use Disorder, in sustained remission  5. Cannabis Use Disorder, in sustained remission  6. Chronic Pain          Labs:     Recent Results (from the past 168 hour(s))   CBC with platelets differential    Collection Time: 03/03/17  7:01 AM   Result Value Ref Range    WBC 4.8 4.0 - 11.0 10e9/L    RBC Count 4.14 3.8 - 5.2 10e12/L    Hemoglobin 12.4 11.7 - 15.7 g/dL    Hematocrit 38.3 35.0 - 47.0 %    MCV 93 78 - 100 fl    MCH 30.0 26.5 - 33.0 pg    MCHC 32.4 31.5 - 36.5 g/dL    RDW 13.5 10.0 - 15.0 %    Platelet Count 241 150 - 450 10e9/L    Diff Method Automated Method     % Neutrophils 48.7 %    % Lymphocytes 42.7 %    % Monocytes 5.3 %    % Eosinophils 2.9 %    % Basophils 0.4 %    % Immature Granulocytes 0.0 %    Nucleated RBCs 0 0 /100    Absolute Neutrophil 2.3 1.6 - 8.3 10e9/L    Absolute Lymphocytes 2.0 0.8 - 5.3 10e9/L    Absolute Monocytes 0.3 0.0 - 1.3 10e9/L    Absolute Eosinophils 0.1 0.0 - 0.7 10e9/L    Absolute Basophils 0.0 0.0 - 0.2 10e9/L    Abs Immature Granulocytes 0.0 0 - 0.4 10e9/L    Absolute Nucleated RBC 0.0    Comprehensive metabolic panel    Collection Time: 03/03/17  7:01 AM   Result Value Ref Range    Sodium 145 (H) 133 - 144 mmol/L    Potassium 3.8 3.4 - 5.3 mmol/L    Chloride 112 (H) 94 - 109 mmol/L    Carbon Dioxide 26 20 - 32 mmol/L    Anion Gap 7 3 - 14 mmol/L    Glucose 84 70 - 99 mg/dL    Urea Nitrogen 9 7 - 30 mg/dL    Creatinine 0.59 0.52 - 1.04 mg/dL    GFR Estimate >90  Non  GFR Calc   >60 mL/min/1.7m2    GFR Estimate If Black >90   GFR Calc   >60 mL/min/1.7m2    Calcium 9.0 8.5 - 10.1 mg/dL    " Bilirubin Total 0.7 0.2 - 1.3 mg/dL    Albumin 3.0 (L) 3.4 - 5.0 g/dL    Protein Total 6.1 (L) 6.8 - 8.8 g/dL    Alkaline Phosphatase 67 40 - 150 U/L    ALT 20 0 - 50 U/L    AST 20 0 - 45 U/L   TSH with free T4 reflex and/or T3 as indicated    Collection Time: 03/03/17  7:01 AM   Result Value Ref Range    TSH 0.92 0.40 - 4.00 mU/L   Lipid panel    Collection Time: 03/03/17  7:01 AM   Result Value Ref Range    Cholesterol 176 <200 mg/dL    Triglycerides 125 <150 mg/dL    HDL Cholesterol 56 >49 mg/dL    LDL Cholesterol Calculated 95 <100 mg/dL    Non HDL Cholesterol 120 <130 mg/dL   Vitamin D    Collection Time: 03/03/17  7:01 AM   Result Value Ref Range    Vitamin D Deficiency screening 16 (L) 20 - 75 ug/L   Vitamin B12    Collection Time: 03/03/17  7:01 AM   Result Value Ref Range    Vitamin B12 302 193 - 986 pg/mL   Folate    Collection Time: 03/03/17  7:01 AM   Result Value Ref Range    Folate 19.6 >5.4 ng/mL   Drug abuse screen 8 urine    Collection Time: 03/03/17  6:35 PM   Result Value Ref Range    Amphetamine Qual Urine  NEG     Negative   Cutoff for a negative amphetamine is 500 ng/mL or less.      Barbiturates Qual Urine  NEG     Negative   Cutoff for a negative barbiturate is 200 ng/mL or less.      Benzodiazepine Qual Urine  NEG     Negative   Cutoff for a negative benzodiazepine is 200 ng/mL or less.      Cannabinoids Qual Urine  NEG     Negative   Cutoff for a negative cannabinoid is 50 ng/mL or less.      Cocaine Qual Urine  NEG     Negative   Cutoff for a negative cocaine is 300 ng/mL or less.      Ethanol Qual Urine  NEG     Negative   Cutoff for a negative urine ethanol is 0.05 g/dL or less      Opiates Qualitative Urine  NEG     Negative   Cutoff for a negative opiate is 300 ng/mL or less.      PCP Qual Urine  NEG     Negative   Cutoff for a negative PCP is 25 ng/mL or less.     Basic metabolic panel    Collection Time: 03/06/17  8:57 AM   Result Value Ref Range    Sodium 143 133 - 144 mmol/L     Potassium 4.2 3.4 - 5.3 mmol/L    Chloride 112 (H) 94 - 109 mmol/L    Carbon Dioxide 25 20 - 32 mmol/L    Anion Gap 6 3 - 14 mmol/L    Glucose 109 (H) 70 - 99 mg/dL    Urea Nitrogen 11 7 - 30 mg/dL    Creatinine 0.78 0.52 - 1.04 mg/dL    GFR Estimate 76 >60 mL/min/1.7m2    GFR Estimate If Black >90   GFR Calc   >60 mL/min/1.7m2    Calcium 9.8 8.5 - 10.1 mg/dL              Consults:   Consultation during this admission received from internal medicine.         Hospital Course:   Jina Reeves was admitted to [facility] [place] with attending No att. providers found on a 72 hour mental health hold. The patient was placed under status 15 (15 minute checks) to ensure patient safety.     Jina Reeves did participate in groups and was visible in the milieu.     The patient's symptoms of depression and SI improved. Medication changes include the following: Decrease Wellbutrin to 150 mg due to high anxiety. Start Topamax for mood stabilization and binge eating. Gabapentin was continued without change. At the time of discharge, Ms. Reeves rated depression and anxiety as moderate, denied SI, SIB, hallucinations, delusions. She will stay with her friend Saige for about a week and maybe with her sister after that. She is hoping to find a job so she doesn't have to go back to being a prostitute.      Jina Reeves was released to [facility]. At the time of discharge Jina Reeves was determined to not be a danger to herself or others.          Discharge Medications:     Discharge Medication List as of 3/6/2017 12:56 PM      START taking these medications    Details   topiramate (TOPAMAX) 25 MG tablet Take 1 tablet (25 mg) by mouth 2 times daily, Disp-60 tablet, R-0, Fax         CONTINUE these medications which have CHANGED    Details   buPROPion (WELLBUTRIN XL) 150 MG 24 hr tablet Take 1 tablet (150 mg) by mouth every morning, Disp-30 tablet, R-0, Fax         CONTINUE these medications which  have NOT CHANGED    Details   oxyCODONE-acetaminophen (PERCOCET) 5-325 MG per tablet Take 1 tablet by mouth three times daily for back pain. Fill on or after 1/28/17., Disp-90 tablet, R-0, Local Print      hydrOXYzine (VISTARIL) 25 MG capsule Take 1 capsule (25 mg) by mouth 3 times daily as needed for anxiety, Disp-120 capsule, R-2, E-Prescribe      ASPIR-LOW 81 MG EC tablet TAKE ONE TABLET BY MOUTH ONE TIME DAILY, Disp-30 tablet, R-3, E-Prescribe      Cholecalciferol (VITAMIN D) 2000 UNITS tablet Take 2,000 Units by mouth daily, Disp-100 tablet, R-3, E-Prescribe      clindamycin (CLINDAMAX) 1 % gel Apply 1 g topically 2 times dailyDisp-60 g, [id number]      gabapentin (NEURONTIN) 600 MG tablet Take 1 tablet (600 mg) by mouth 3 times daily, Disp-270 tablet, R-1, E-Prescribe      Ferrous Sulfate GRAN by Misc.(Non-Drug; Combo Route) route., Historical      DIFFERIN 0.1 % cream Apply  topically At Bedtime. ANGELO.  Will replace Rx for adapalene (DIFFERIN) 0.1 % gelDisp-30 g, R-5, DAWFax      !! ORDER FOR DME Equipment being ordered: thumb spica wrist braceDisp-1 Device, R-0, Normal      polyethylene glycol (MIRALAX) powder Take 17 g by mouth daily as needed., Disp-30 capful, R-11, E-Prescribe      STATIN NOT PRESCRIBED, INTENTIONAL, by Other route continuous prn., R-0, Historical      !! ORDER FOR DME one light box for seasonal affective disorderDisp-1, R-0, Normal       !! - Potential duplicate medications found. Please discuss with provider.      STOP taking these medications       ACE NOT PRESCRIBED, INTENTIONAL, Comments:   Reason for Stopping:                    Psychiatric Examination:   Appearance:  awake, alert, well groomed, cooperative, no apparent distress and mildly obese  Attitude:  cooperative  Eye Contact:  good  Mood:  sad   Affect:  mood congruent  Speech:  clear, coherent  Psychomotor Behavior:  no evidence of tardive dyskinesia, dystonia, or tics  Thought Process:  logical and goal  oriented  Associations:  no loose associations  Thought Content:  no evidence of suicidal ideation or homicidal ideation  Insight:  good  Judgment:  intact  Oriented to:  time, person, and place  Attention Span and Concentration:  intact  Recent and Remote Memory:  intact  Language: Able to read and write  Fund of Knowledge: appropriate  Muscle Strength and Tone: normal  Gait and Station: Normal         Discharge Plan:          Mar 13, 2017 10:40 AM CDT   Office Visit with Vee Marlow MD   [facility]    [address] [place] [id number] [phone]             Bring a current list of meds and any records pertaining to this visit. For Physicals, please bring immunization records and any forms needing to be filled out.  Please arrive 10 minutes early to complete paperwork.              Mar 15, 2017  3:00 PM CDT   Adult Med Follow UP with HALIE Ray CNP   Psychiatry Clinic ([facility])    [address]  [address] [facility] [id number] [phone]          Attestation:  The patient has been seen and evaluated by me,  HALIE Montiel CNP

## 2017-03-06 NOTE — PLAN OF CARE
"Problem: Depressive Symptoms  Goal: Depressive Symptoms  Signs and symptoms of listed problems will be absent or manageable.    Pt will report sleeping 7 hours a night  Pt will be free from SI/SIB  Pt will be medication compliant %100 of time   Outcome: Adequate for Discharge Date Met:  03/06/17  Pt has been sleeping 6-7 hours each night. Pt is free from Suicidality and self harm. Pt has been medication compliant. Pt states she feels ready for discharge and understands discharge plans. Pt is able to identify coping skills and relapse prevention strategies. Pt is going to stay with a friend. All belongings sent with pt. Pt reports mood as \"ok\". Pt states she feels better with the new medication and is hopeful.      "

## 2017-03-06 NOTE — PLAN OF CARE
Problem: General Plan of Care (Inpatient Behavioral)  Goal: Individualization/Patient Specific Goal (IP Behavioral)  The patient and/or their representative will achieve their patient-specific goals related to the plan of care.    The patient-specific goals include:    Illness Management Recovery model: Objectives    Patient will identify reason(s) for hospitalization from their perspective.  Patient will identify a minimum of three goals for discharge.  Patient will identify a minimum of three triggers that may increase their symptoms.  Patient will identify a minimum of three coping skills they can do to stay well.   Patient will identify their support system to demonstrate readiness for discharge.   Outcome: Adequate for Discharge Date Met:  03/06/17  Illness Management Recovery model:  Triggers.      Patient identified the following triggers which may exacerbate their illness:     1. finances  2. stress  3.

## 2017-03-07 ENCOUNTER — TELEPHONE (OUTPATIENT)
Dept: FAMILY MEDICINE | Facility: CLINIC | Age: 57
End: 2017-03-07

## 2017-03-07 NOTE — TELEPHONE ENCOUNTER
Patient discharged from Inpatient.      Discharge location: [facility]  Discharge date: 3/6/17  Diagnosis: Major Depressive Disorder, Recurrent Episode, Moderate    Please follow up as appropriate. If no follow up required, please close encounter.      Jessica MEADOWS, Patient Care

## 2017-03-07 NOTE — TELEPHONE ENCOUNTER
oxyCODONE-acetaminophen (PERCOCET) 5-325 MG per tablet      Last Written Prescription Date:  1/25/17  Last Fill Quantity: 90,   # refills: 0  Last Office Visit with MATTHEW, JEFFERYP or [facility] prescribing provider: 10/14/12  Future Office visit:    Next 5 appointments (look out 90 days)     Mar 10, 2017  9:40 AM CST   [facility]    [address] [place] [id number] [phone]                   Routing refill request to provider for review/approval because:  Drug not on the [facility], [facility] or [facility] refill protocol or controlled substance

## 2017-03-07 NOTE — TELEPHONE ENCOUNTER
"ED/Discharge Protocol    \"Hi, my name is Justina Sheffield, a registered nurse, and I am calling on behalf of Dr. Marlow's office at [place].  I am calling to follow up and see how things are going for you after your recent visit.\"    \"I see that you were in the (ER/UC/IP) on 3/2/17.    How are you doing now that you are home?\" \"I have little pain in my left leg and lower back, but I can't get my medications until I am seen.\"  Pt denies suicidal thoughts or plan to carry out the plan. Denies difficulty concentrating, or difficulty sleeping. Pt said: \"I don't wan to be around anybody.\"    Is patient experiencing symptoms that may require a hospital visit?  No    Discharge Instructions    \"Let's review your discharge instructions.  What is/are the follow-up recommendations?  Pt. Response: \"I have to read it, they changed my medications.\"     \"Were you instructed to make a follow-up appointment?\"  Pt. Response: Yes.  Has appointment been made?   Yes      \"When you see the provider, I would recommend that you bring your discharge instructions with you.    Medications    \"How many new medications are you on since your hospitalization/ED visit?\"    0-1  \"How many of your current medicines changed (dose, timing, name, etc.) while you were in the hospital/ED visit?\"   0-1  \"Do you have questions about your medications?\"   No  \"Were you newly diagnosed with heart failure, COPD, diabetes or did you have a heart attack?\"   No  For patients on insulin: \"Did you start on insulin in the hospital or did you have your insulin dose changed?\"   No    Was MTM referral placed (*Make sure to put transitions as reason for referral)?   No    Call Summary    \"Do you have any questions or concerns about your condition or care plan at the moment?\"    No  Triage nurse advice given: \"continue taking your medications and will see you on Friday.\"    Patient was in ER 1 in the past year (assess appropriateness of ER visits.)      \"If " "you have questions or things don't continue to improve, we encourage you contact us through the main clinic number.  Even if the clinic is not open, triage nurses are available 24/7 to help you.     We would like you to know that our clinic has extended hours (provide information).  We also have urgent care (provide details on closest location and hours/contact info)\"      \"Thank you for your time and take care!\"        "

## 2017-03-08 ENCOUNTER — CARE COORDINATION (OUTPATIENT)
Dept: PSYCHIATRY | Facility: CLINIC | Age: 57
End: 2017-03-08

## 2017-03-08 ENCOUNTER — CARE COORDINATION (OUTPATIENT)
Dept: CARE COORDINATION | Facility: CLINIC | Age: 57
End: 2017-03-08

## 2017-03-08 RX ORDER — OXYCODONE AND ACETAMINOPHEN 5; 325 MG/1; MG/1
TABLET ORAL
Qty: 6 TABLET | Refills: 0 | Status: SHIPPED | OUTPATIENT
Start: 2017-03-08 | End: 2017-03-10

## 2017-03-08 NOTE — PROGRESS NOTES
"Rcvd Discharge Summary: Patient lost it.  Compare Pt Discharge summary to that in EPIC: yes  Able to get meds filled: She reports she was not given Percocet, Aspirin or Gabapentin at time of D.C.   Mood: \" so so.\" Anxiety rates 7/10\" and has PRN Vistaril available. Encouraged to take as needed. Denied SI, visual or auditory hallucinations. Reports sleeping well. Patient staying with friend currently but is planning on finding a shelter. Patient seemed reluctant to come to March 15th apt. but stated she would.  SE: Patient denies    D/ch Plan:  Admit Date: 3/2/17  Discharge date: 3/6/17  Next appt: March 13 with Dr. Garibay and March 15 with Olive [facility]  Referrals (therapy, daytx, homecare, ect): none  Crisis Plan: Her plan is to \"call a friend.\"  Contact Numbers Reviewed: pt aware of clinic phone.    TCM:  Direct contact made with pt within 2 business days? yes  Pt is scheduled in clinic within 14 calendar days of discharge? yes  Pt qualifies for TCM visit? yes      "

## 2017-03-08 NOTE — TELEPHONE ENCOUNTER
Will give two day supply for this med and discuss further this refill at upcoming OV this week. please place rx at front for  and update patient when completed.

## 2017-03-08 NOTE — PROGRESS NOTES
3/8/2017 Clinic Care Coordination Contact  Care Team Conversations  Pt . did call me to report she could only leave a message on  the numbers I provided.  Assisted pt.in making a call to the main shelter number at [facility]. [phone].  She has a safe place to stay for the next few night.  After talking with the advocate, She understands she needs to go to [facility] at [address] [place] , to have an assessment completed and be given an ID Card.  The person spoke to, said she would be able to get into a shelter the same day. They will assess for appropriate shelter.  Pt. seems to be comfortable with this plan. Plans to have her friend take her there tomorrow.  At this time pt sounds very calm and clear in speech and thought. Provided pt with number for my co-worker, to call in my absence, if she needs further help.. She is in agreement to work next week- on requesting a [facility] .     Plan: Pt will go for interview for a shelter tomorrow. She will call Joel [phone]  with any needs or questions.  SW will follow up in 2-3 business days.     Keely Sparks [facility]  , Clinic Care Coordination  Clinics:  [facility], [place]  [phone]   3/8/2017   3:38 PM  3/8/2017 Clinic Care Coordination Contact  Social Work   Call to pt to see if she reached anyone at the shelter number.  She said she laid down to nap and will get up soon and call.   Assured me she will call me back.   Jina  is aware I will be out of the office the next few days.    Plan Pt will call shelter number and call SW back with her plan.    Keely Sparks [facility]  , Clinic Care Coordination  Clinics:  [place],  [facility] [place]  [phone]   3/8/2017   2:10 PM    3/8/2017 Clinic Care Coordination Contact  Social Work   Incoming call from pt.    Informing me of recent  "hospitalization  in -pt at [facility] from  3/02/17-3/06/17   .\" I went to my Psychiatry appointment and they locked me up.\"   Notes indicate Suicidal thoughts, hearing voices.  Jina indicates she got lost and frustrated prior to her appointment. .  After reviewing in- pt. notes it is clear patient has not provided me with accurate information.  She just recently told me she was still living with her daughter.  Unaware of unstable living situation and several other things.   Pt.  has been calling this writer with concerns about her insurance.    She reports it was resolved after a visit to the [facility].    Did talk of regular Psychiatry appointments at the [place] with Olive Akins  . Pt. gave priority to these appointments. Today she says she is at a friends and can stay there until the weekend.  States \"the hospital was going to help me  find a place but they did not. \"   Offered assistance getting into a shelter.  Pt. was receptive.   Feels safe at her  friend's for now.   This writer offered to assist with a conference call -She said she could make the call and will call me back.   No thoughts of hurting herself. Has crisis numbers.  Appointments with PCP and Psych scheduled.    Plan: Pt will call to arrange admission to a shelter. Will call SW back with her plan  SW will assist as needed with shelter placement. . Explore requesting a [facility] .    Keely Sparks [facility]  , Clinic Care Coordination  Clinics:  [facility], [place]  [phone]   3/8/2017   12:16 PM  "

## 2017-03-09 ENCOUNTER — CARE COORDINATION (OUTPATIENT)
Dept: CARE COORDINATION | Facility: CLINIC | Age: 57
End: 2017-03-09

## 2017-03-09 NOTE — PROGRESS NOTES
Clinic Care Coordination Contact - Social Work - Follow-Up - 3-9-17  [facility]/Voicemail    Referral Source: PCP  Clinical Data: SW/Care Coordinator Outreach to pt regarding whether pt was able to get to [facility] today to apply for shelter.  Outreach attempted x1.  Left message on voicemail with call back information and requested return call.  Plan: SW/Care Coordinator will follow up with in 1-2 days if pt does not call SW back by then     FORREST Casey  Care Coordinator - Social Work  [facility]  Office:  [phone]  3/9/2017 4:25 PM

## 2017-03-10 ENCOUNTER — OFFICE VISIT (OUTPATIENT)
Dept: FAMILY MEDICINE | Facility: CLINIC | Age: 57
End: 2017-03-10
Payer: MEDICAID

## 2017-03-10 ENCOUNTER — CARE COORDINATION (OUTPATIENT)
Dept: CARE COORDINATION | Facility: CLINIC | Age: 57
End: 2017-03-10

## 2017-03-10 VITALS
HEIGHT: 69 IN | SYSTOLIC BLOOD PRESSURE: 122 MMHG | HEART RATE: 90 BPM | DIASTOLIC BLOOD PRESSURE: 78 MMHG | WEIGHT: 222.1 LBS | BODY MASS INDEX: 32.89 KG/M2 | OXYGEN SATURATION: 97 % | TEMPERATURE: 97.8 F

## 2017-03-10 DIAGNOSIS — G89.4 CHRONIC PAIN SYNDROME: ICD-10-CM

## 2017-03-10 DIAGNOSIS — Z11.3 SCREEN FOR STD (SEXUALLY TRANSMITTED DISEASE): ICD-10-CM

## 2017-03-10 DIAGNOSIS — F10.10 ALCOHOL ABUSE, EPISODIC DRINKING BEHAVIOR: ICD-10-CM

## 2017-03-10 DIAGNOSIS — M47.816 FACET ARTHROPATHY, LUMBAR: ICD-10-CM

## 2017-03-10 DIAGNOSIS — M48.061 STENOSIS, SPINAL, LUMBAR: ICD-10-CM

## 2017-03-10 DIAGNOSIS — E78.5 HYPERLIPIDEMIA LDL GOAL <130: ICD-10-CM

## 2017-03-10 DIAGNOSIS — E55.9 VITAMIN D DEFICIENCY DISEASE: ICD-10-CM

## 2017-03-10 DIAGNOSIS — F33.1 MAJOR DEPRESSIVE DISORDER, RECURRENT EPISODE, MODERATE (H): Primary | ICD-10-CM

## 2017-03-10 LAB
AMPHETAMINES UR QL: ABNORMAL
BARBITURATES UR QL: ABNORMAL
BENZODIAZ UR QL: ABNORMAL
CANNABINOIDS UR QL: ABNORMAL
COCAINE UR QL: ABNORMAL
MDA UR QL SCN: ABNORMAL
METHADONE UR QL SCN: ABNORMAL
METHAMPHET UR QL SCN: ABNORMAL
MICRO REPORT STATUS: ABNORMAL
OPIATES UR QL SCN: ABNORMAL
OXYCODONE UR QL: ABNORMAL
PCP UR QL SCN: ABNORMAL
SPECIMEN SOURCE: ABNORMAL
TRICYCLICS UR QL SCN: ABNORMAL
WET PREP SPEC: ABNORMAL

## 2017-03-10 PROCEDURE — 87491 CHLMYD TRACH DNA AMP PROBE: CPT | Performed by: FAMILY MEDICINE

## 2017-03-10 PROCEDURE — 87210 SMEAR WET MOUNT SALINE/INK: CPT | Performed by: FAMILY MEDICINE

## 2017-03-10 PROCEDURE — 36415 COLL VENOUS BLD VENIPUNCTURE: CPT | Performed by: FAMILY MEDICINE

## 2017-03-10 PROCEDURE — 87591 N.GONORRHOEAE DNA AMP PROB: CPT | Performed by: FAMILY MEDICINE

## 2017-03-10 PROCEDURE — 99000 SPECIMEN HANDLING OFFICE-LAB: CPT | Performed by: FAMILY MEDICINE

## 2017-03-10 PROCEDURE — 86803 HEPATITIS C AB TEST: CPT | Performed by: FAMILY MEDICINE

## 2017-03-10 PROCEDURE — 86592 SYPHILIS TEST NON-TREP QUAL: CPT | Performed by: FAMILY MEDICINE

## 2017-03-10 PROCEDURE — 86780 TREPONEMA PALLIDUM: CPT | Performed by: FAMILY MEDICINE

## 2017-03-10 PROCEDURE — 87389 HIV-1 AG W/HIV-1&-2 AB AG IA: CPT | Performed by: FAMILY MEDICINE

## 2017-03-10 PROCEDURE — 99214 OFFICE O/P EST MOD 30 MIN: CPT | Performed by: FAMILY MEDICINE

## 2017-03-10 PROCEDURE — 80305 DRUG TEST PRSMV DIR OPT OBS: CPT | Performed by: FAMILY MEDICINE

## 2017-03-10 PROCEDURE — 86780 TREPONEMA PALLIDUM: CPT | Mod: 90 | Performed by: FAMILY MEDICINE

## 2017-03-10 RX ORDER — GABAPENTIN 600 MG/1
600 TABLET ORAL 3 TIMES DAILY
Qty: 270 TABLET | Refills: 1 | Status: SHIPPED | OUTPATIENT
Start: 2017-03-10

## 2017-03-10 RX ORDER — CHOLECALCIFEROL (VITAMIN D3) 50 MCG
2000 TABLET ORAL DAILY
Qty: 100 TABLET | Refills: 3 | Status: CANCELLED | OUTPATIENT
Start: 2017-03-10

## 2017-03-10 RX ORDER — OXYCODONE AND ACETAMINOPHEN 5; 325 MG/1; MG/1
TABLET ORAL
Qty: 45 TABLET | Refills: 0 | Status: SHIPPED | OUTPATIENT
Start: 2017-03-10 | End: 2017-03-22

## 2017-03-10 RX ORDER — ERGOCALCIFEROL 1.25 MG/1
50000 CAPSULE, LIQUID FILLED ORAL
Qty: 12 CAPSULE | Refills: 0 | Status: SHIPPED | OUTPATIENT
Start: 2017-03-10 | End: 2017-05-27

## 2017-03-10 ASSESSMENT — ANXIETY QUESTIONNAIRES
7. FEELING AFRAID AS IF SOMETHING AWFUL MIGHT HAPPEN: NEARLY EVERY DAY
GAD7 TOTAL SCORE: 18
3. WORRYING TOO MUCH ABOUT DIFFERENT THINGS: NEARLY EVERY DAY
2. NOT BEING ABLE TO STOP OR CONTROL WORRYING: MORE THAN HALF THE DAYS
1. FEELING NERVOUS, ANXIOUS, OR ON EDGE: NEARLY EVERY DAY
6. BECOMING EASILY ANNOYED OR IRRITABLE: NEARLY EVERY DAY
IF YOU CHECKED OFF ANY PROBLEMS ON THIS QUESTIONNAIRE, HOW DIFFICULT HAVE THESE PROBLEMS MADE IT FOR YOU TO DO YOUR WORK, TAKE CARE OF THINGS AT HOME, OR GET ALONG WITH OTHER PEOPLE: VERY DIFFICULT
5. BEING SO RESTLESS THAT IT IS HARD TO SIT STILL: MORE THAN HALF THE DAYS

## 2017-03-10 ASSESSMENT — PAIN SCALES - GENERAL: PAINLEVEL: SEVERE PAIN (6)

## 2017-03-10 ASSESSMENT — PATIENT HEALTH QUESTIONNAIRE - PHQ9: 5. POOR APPETITE OR OVEREATING: MORE THAN HALF THE DAYS

## 2017-03-10 NOTE — NURSING NOTE
"Chief Complaint   Patient presents with     Depression     Anxiety       Initial /78 (BP Location: Right arm, Patient Position: Chair, Cuff Size: Adult Large)  Pulse 90  Temp 97.8  F (36.6  C) (Oral)  Ht 1.753 m (5' 9\")  Wt 100.7 kg (222 lb 1.6 oz)  LMP 06/05/2013  SpO2 97%  Breastfeeding? No  BMI 32.8 kg/m2 Estimated body mass index is 32.8 kg/(m^2) as calculated from the following:    Height as of this encounter: 1.753 m (5' 9\").    Weight as of this encounter: 100.7 kg (222 lb 1.6 oz).  Medication Reconciliation: complete     GLORY Rodriguez MA        "

## 2017-03-10 NOTE — PROGRESS NOTES
"  SUBJECTIVE:                                                    Jina Reeves is a 56 year old female who presents to clinic today for the following health issues:        Depression and Anxiety Follow-Up    Status since last visit: Improved     Other associated symptoms:None    Complicating factors:     Significant life event: No     Current substance abuse: None    PHQ-9 SCORE 1/22/2016 4/29/2016 10/24/2016   Total Score - - -   Total Score 14 11 23   Some encounter information is confidential and restricted. Go to Review Flowsheets activity to see all data.     GAIL-7 SCORE 1/22/2016 4/29/2016 10/24/2016   Total Score - - -   Total Score 11 10 17        PHQ-9  English      PHQ-9   Any Language     GAD7       Amount of exercise or physical activity: None    Problems taking medications regularly: No    Medication side effects: none  Diet: regular (no restrictions)        Problem list and histories reviewed & adjusted, as indicated.  Additional history: as documented    Patient was admitted on 72 hour hold to [facility] for SI/depression with psychotic features, etoh use d/o. 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 She was started on topamax for mood stabilization and binge eating and Wellbutrin dose was decreased.     Jina was discharged from the hospital on Monday (3/6) and she is staying with a friend until Sunday. Notes that she is safe where she is staying. She has spoken with Keely (a ) about various resources as she is very concerned about being homeless. She would like to find a shelter as she no longer wants to go back to doing sexual activities. Her mood has been \"ok' but when she thinks about her situation, she will feel anxious and depressed. meds have been helpful. Decreased Wellbutrin in hospital and added Topamax. Topamax has decreased her appetite- she was overeating before due to depression but appetite is back to baseline. She " "has not been eating breakfast for the past 2 days. States she flet misunderstood regarding her hospitalization. She reports she will have suicidal thoughts occasionally but reports she will not go through it due to her kids. She will have days where she will be ok with not waking up but denies any current thoughts of self harm.      She has not drank since leaving the hospital- she is not signed up for any alcohol treatment. She reports one of her biggest fears is going back to street drugs.     Reports she has been sleeping well- there were 1-2 instances in the hospital she could not sleep well even with sleeping meds.     She was hearing voices in the hospital and reports that has persisted. Notes she hears two sisters-juana and Malia, one who tells her to do good things, and the other voices tell her not to go to appointments and do street drugs, or sexual activities. She is able to ignore Chocolate depending on her mood, she was able to ignore her to come to appointment. Reports that Juana is a good friend when she does bad activities, has to drink more to do sexual activities that she did not want to do. Hearing voices will come and go depending on the depression.     She had a blood test at [facility] to test for STDS. Denies sores, vaginal sx's, dysuria.     Low Back pain is increased with sexual activity. Pain is in low back and radiates to the front of her left leg and will go into her feet and leg She went off pain meds for a day and pain was 'awful'. She took Aleve which was helpful.     Additional Notes  -reports she is always cold. She has not been taking VitaminD for a \"while\".     Patient Active Problem List   Diagnosis     Obesity     Cocaine abuse in remission     Hyperlipidemia LDL goal <130     Glucose intolerance (pre-diabetes)     Iron deficiency anemia     Vitamin D deficiency     Health Care Home (Not Active)      Anxiety state     Major depressive disorder, recurrent episode, " moderate (H)     Acne     Family history of colon cancer     Facet arthropathy, lumbar (H)     Stenosis, spinal, lumbar     Alcohol abuse, episodic drinking behavior     Chronic pain     Vitamin D deficiency disease     Depression     Past Surgical History   Procedure Laterality Date     Colonoscopy  10/29/2013     Procedure: COLONOSCOPY;  COLONOSCOPY-SCREENING / VITO;  Surgeon: Westley Larios MD;  Location: [facility] OR     Colonoscopy  10/29/2013     Procedure: COMBINED COLONOSCOPY, SINGLE BIOPSY/POLYPECTOMY BY BIOPSY;;  Surgeon: Westley Larios MD;  Location: [facility] OR       Social History   Substance Use Topics     Smoking status: Former Smoker     Smokeless tobacco: Never Used      Comment: quit  over 20 yrs ago     Alcohol use Yes     Family History   Problem Relation Age of Onset     Cancer - colorectal Sister      age 38     CANCER No family hx of      DIABETES No family hx of      Hypertension No family hx of      CEREBROVASCULAR DISEASE No family hx of      Thyroid Disease No family hx of      Glaucoma No family hx of      Macular Degeneration No family hx of          Current Outpatient Prescriptions   Medication Sig Dispense Refill     oxyCODONE-acetaminophen (PERCOCET) 5-325 MG per tablet Take 1 tablet by mouth three times daily for back pain. Fill on or after 1/28/17. 6 tablet 0     topiramate (TOPAMAX) 25 MG tablet Take 1 tablet (25 mg) by mouth 2 times daily 60 tablet 0     buPROPion (WELLBUTRIN XL) 150 MG 24 hr tablet Take 1 tablet (150 mg) by mouth every morning 30 tablet 0     hydrOXYzine (VISTARIL) 25 MG capsule Take 1 capsule (25 mg) by mouth 3 times daily as needed for anxiety 120 capsule 2     ASPIR-LOW 81 MG EC tablet TAKE ONE TABLET BY MOUTH ONE TIME DAILY 30 tablet 3     Cholecalciferol (VITAMIN D) 2000 UNITS tablet Take 2,000 Units by mouth daily 100 tablet 3     clindamycin (CLINDAMAX) 1 % gel Apply 1 g topically 2 times daily 60 g 3     DIFFERIN 0.1 % cream Apply  topically At  "Bedtime. ANGELO.  Will replace Rx for adapalene (DIFFERIN) 0.1 % gel 30 g 5     gabapentin (NEURONTIN) 600 MG tablet Take 1 tablet (600 mg) by mouth 3 times daily 270 tablet 1     Ferrous Sulfate GRAN by Misc.(Non-Drug; Combo Route) route.       ORDER FOR DME Equipment being ordered: thumb spica wrist brace 1 Device 0     polyethylene glycol (MIRALAX) powder Take 17 g by mouth daily as needed. 30 capful 11     STATIN NOT PRESCRIBED, INTENTIONAL, by Other route continuous prn.  0     ORDER FOR DME one light box for seasonal affective disorder 1 0     Allergies   Allergen Reactions     Vicodin [Hydrocodone-Acetaminophen] Itching       Reviewed and updated as needed this visit by clinical staff  Tobacco  Allergies  Meds  Med Hx  Surg Hx  Fam Hx  Soc Hx      Reviewed and updated as needed this visit by Provider         ROS:  Constitutional, HEENT, cardiovascular, pulmonary, gi and gu systems are negative, except as otherwise noted.    OBJECTIVE:                                                    /78 (BP Location: Right arm, Patient Position: Chair, Cuff Size: Adult Large)  Pulse 90  Temp 97.8  F (36.6  C) (Oral)  Ht 1.753 m (5' 9\")  Wt 100.7 kg (222 lb 1.6 oz)  LMP 06/05/2013  SpO2 97%  Breastfeeding? No  BMI 32.8 kg/m2  Body mass index is 32.8 kg/(m^2).  GENERAL: healthy, alert and no distress   NECK: no adenopathy, no asymmetry, masses, or scars and thyroid normal to palpation  RESP: lungs clear to auscultation - no rales, rhonchi or wheezes  CV: regular rate and rhythm, normal S1 S2, no S3 or S4, no murmur, click or rub, no peripheral edema and peripheral pulses strong  NEURO: Normal strength and tone, mentation intact and speech normal. Left ankle reflex 1+, other reflexes 2+ bilaterally.   BACK: no CVA tenderness.  Upper lumbar tenderness, right>left.     Diagnostic Test Results:  PHQ-9 SCORE 4/29/2016 10/24/2016 3/10/2017   Total Score - - -   Total Score 11 23 14   Some encounter information is " confidential and restricted. Go to Review Flowsheets activity to see all data.     GAIL-7 SCORE 4/29/2016 10/24/2016 3/10/2017   Total Score - - -   Total Score 10 17 18     Component      Latest Ref Rng & Units 3/3/2017 3/6/2017   WBC      4.0 - 11.0 10e9/L 4.8    RBC Count      3.8 - 5.2 10e12/L 4.14    Hemoglobin      11.7 - 15.7 g/dL 12.4    Hematocrit      35.0 - 47.0 % 38.3    MCV      78 - 100 fl 93    MCH      26.5 - 33.0 pg 30.0    MCHC      31.5 - 36.5 g/dL 32.4    RDW      10.0 - 15.0 % 13.5    Platelet Count      150 - 450 10e9/L 241    Diff Method       Automated Method    % Neutrophils      % 48.7    % Lymphocytes      % 42.7    % Monocytes      % 5.3    % Eosinophils      % 2.9    % Basophils      % 0.4    % Immature Granulocytes      % 0.0    Nucleated RBCs      0 /100 0    Absolute Neutrophil      1.6 - 8.3 10e9/L 2.3    Absolute Lymphocytes      0.8 - 5.3 10e9/L 2.0    Absolute Monocytes      0.0 - 1.3 10e9/L 0.3    Absolute Eosinophils      0.0 - 0.7 10e9/L 0.1    Absolute Basophils      0.0 - 0.2 10e9/L 0.0    Abs Immature Granulocytes      0 - 0.4 10e9/L 0.0    Absolute Nucleated RBC       0.0    Sodium      133 - 144 mmol/L 145 (H) 143   Potassium      3.4 - 5.3 mmol/L 3.8 4.2   Chloride      94 - 109 mmol/L 112 (H) 112 (H)   Carbon Dioxide      20 - 32 mmol/L 26 25   Anion Gap      3 - 14 mmol/L 7 6   Glucose      70 - 99 mg/dL 84 109 (H)   Urea Nitrogen      7 - 30 mg/dL 9 11   Creatinine      0.52 - 1.04 mg/dL 0.59 0.78   GFR Estimate      >60 mL/min/1.7m2 >90 . . . 76   GFR Estimate If Black      >60 mL/min/1.7m2 >90 . . . >90 . . .   Calcium      8.5 - 10.1 mg/dL 9.0 9.8   Bilirubin Total      0.2 - 1.3 mg/dL 0.7    Albumin      3.4 - 5.0 g/dL 3.0 (L)    Protein Total      6.8 - 8.8 g/dL 6.1 (L)    Alkaline Phosphatase      40 - 150 U/L 67    ALT      0 - 50 U/L 20    AST      0 - 45 U/L 20    Cholesterol      <200 mg/dL 176    Triglycerides      <150 mg/dL 125    HDL Cholesterol      >49  mg/dL 56    LDL Cholesterol Calculated      <100 mg/dL 95    Non HDL Cholesterol      <130 mg/dL 120    TSH      0.40 - 4.00 mU/L 0.92    Vitamin D Deficiency screening      20 - 75 ug/L 16 (L)    Vitamin B12      193 - 986 pg/mL 302    Folate      >5.4 ng/mL 19.6         ASSESSMENT/PLAN:                                                      1. Major depressive disorder, recurrent episode, moderate (H)  Recent flare. Pt discharged from hospital and currently living with friend. She is following with psychiatry and therapist and is In contact with  to look into resources and housing options. Pt has crisis hotline numbers.Verbally contracts for safety.F/U in 6 weeks for check-in.     2. Facet arthropathy, lumbar (H)5. Stenosis, spinal, lumbar  3. Stenosis, spinal, lumbar  4. Chronic pain syndrome  Due to hx of substance abuse, will only fill RX for 2 weeks at a time with close monitoring and Utox screens prior to refill. Urine screen today. Reviewed avoiding alcohol and other drugs while taking medication.Verbally contracts for safety. May need to d/c percocet if unable to avoid etoh.   - gabapentin (NEURONTIN) 600 MG tablet; Take 1 tablet (600 mg) by mouth 3 times daily  Dispense: 270 tablet; Refill: 1  - oxyCODONE-acetaminophen (PERCOCET) 5-325 MG per tablet; Take 1 tablet by mouth three times daily for back pain. Fill on or after 1/28/17.  Dispense: 45 tablet; Refill: 0  - Drug abuse screen, urine (Pain Care Package)    5. Vitamin D deficiency disease  Start high dose vitaminD.   - vitamin D (ERGOCALCIFEROL) 94540 UNIT capsule; Take 1 capsule (50,000 Units) by mouth every 7 days for 12 doses  Dispense: 12 capsule; Refill: 0    6. Hyperlipidemia LDL goal <130  Restart aspirin.   - aspirin (ASPIR-LOW) 81 MG EC tablet; Take 1 tablet (81 mg) by mouth daily  Dispense: 90 tablet; Refill: 3    7. Alcohol abuse, episodic drinking behavior  Not currently in treatment. Discussed continuing to abstain from  alcohol.   - Drug abuse screen, urine (Pain Care Package)    8. Screen for STD (sexually transmitted disease)  Recent high risk sexual behaviour. Declines pelvic exam today.   - Wet prep  - Neisseria gonorrhoeae PCR  - Chlamydia trachomatis PCR  - HIV Antigen Antibody Combo  - Hepatitis C antibody  - Anti Treponema    See Patient Instructions  Patient Instructions   Make sure to eat 3 meals a day.    Take  1 capsule(50,000 IUs) every 7 days for 12 doses.     Follow up in 6 weeks for check-in.     Follow up with psychiatrist and therapist.         This document serves as a record of the services and decisions personally performed and made by Vee Marlow MD. It was created on her behalf by Malia Valderrama,a trained medical scribe. The creation of this document is based the provider's statements to the medical scribe.  Malia Valderrama March 10, 2017 10:29 AM     The information in this document, created by a scribe for me, accurately reflects the services I personally performed and the decisions made by me. I have reviewed and approved this document for accuracy.    MD Vee Borrego MD  [facility]

## 2017-03-10 NOTE — MR AVS SNAPSHOT
After Visit Summary   3/10/2017    Jina Reeves    MRN: [id number]           Patient Information     Date Of Birth          1960        Visit Information        Provider Department      3/10/2017 9:40 AM Vee Marlow MD [facility]        Today's Diagnoses     Major depressive disorder, recurrent episode, moderate (H)    -  1    Facet arthropathy, lumbar (H)        Vitamin D deficiency disease        Hyperlipidemia LDL goal <130        Stenosis, spinal, lumbar        Chronic pain syndrome        Screen for colon cancer        Visit for screening mammogram        Alcohol abuse, episodic drinking behavior        Screen for STD (sexually transmitted disease)          Care Instructions    Make sure to eat 3 meals a day.    Take  1 capsule(50,000 IUs) every 7 days for 12 doses.     Follow up in 6 weeks for check-in.     Follow up with psychiatrist and therapist.         Follow-ups after your visit        Your next 10 appointments already scheduled     Mar 14, 2017 10:00 AM CDT   Adult Psychotherapy with Sheyla Hawkins, PhD   Psychiatry Clinic ([facility])    [place] [id number]  [facility] [address]   [phone]            Mar 15, 2017  3:00 PM CDT   Adult Med Follow UP with Olive Akins APRN CNP   Psychiatry Clinic ([facility])    [place] [id number]  [address] [place] [address]   [phone]            Apr 14, 2017  9:20 AM CDT   Office Visit with Vee Marlow MD   [facility] ([facility])    [address] [place] [address] [phone]           Bring a current list of meds and any records pertaining to this visit.  For Physicals, please bring immunization records and any forms needing to be filled out.  Please arrive 10 minutes early to complete paperwork.              Future tests that  "were ordered for you today     Open Future Orders        Priority Expected Expires Ordered    MA SCREENING DIGITAL BILAT - Future  (s+30) Routine  3/10/2018 3/10/2017            Who to contact     If you have questions or need follow up information about today's clinic visit or your schedule please contact [facility] directly at [phone].  Normal or non-critical lab and imaging results will be communicated to you by [employer], letter or phone within 4 business days after the clinic has received the results. If you do not hear from us within 7 days, please contact the clinic through [employer] or phone. If you have a critical or abnormal lab result, we will notify you by phone as soon as possible.  Submit refill requests through [employer] or call your pharmacy and they will forward the refill request to us. Please allow 3 business days for your refill to be completed.          Additional Information About Your Visit        [employer] Information     [employer] gives you secure access to your electronic health record. If you see a primary care provider, you can also send messages to your care team and make appointments. If you have questions, please call your primary care clinic.  If you do not have a primary care provider, please call [phone] and they will assist you.        Care EveryWhere ID     This is your Care EveryWhere ID. This could be used by other organizations to access your [facility] medical records  [id number]        Your Vitals Were     Pulse Temperature Height Last Period Pulse Oximetry Breastfeeding?    90 97.8  F (36.6  C) (Oral) 1.753 m (5' 9\") 06/05/2013 97% No    BMI (Body Mass Index)                   32.8 kg/m2            Blood Pressure from Last 3 Encounters:   03/10/17 122/78   03/06/17 135/85   10/24/16 112/80    Weight from Last 3 Encounters:   03/10/17 100.7 kg (222 lb 1.6 oz)   03/05/17 101.1 kg (222 lb 12.8 oz)   10/24/16 93.3 kg (205 lb 9.6 oz)              We Performed the " Following     Anti Treponema     Chlamydia trachomatis PCR     DEPRESSION ACTION PLAN (DAP) Order [[id number]]     Drug abuse screen, urine (Pain Care Package)     Hepatitis C antibody     HIV Antigen Antibody Combo     Neisseria gonorrhoeae PCR     Wet prep          Today's Medication Changes          These changes are accurate as of: 3/10/17 10:48 AM.  If you have any questions, ask your nurse or doctor.               Start taking these medicines.        Dose/Directions    vitamin D 02702 UNIT capsule   Commonly known as:  ERGOCALCIFEROL   Used for:  Vitamin D deficiency disease   Started by:  Vee Marlow MD        Dose:  28796 Units   Take 1 capsule (50,000 Units) by mouth every 7 days for 12 doses   Quantity:  12 capsule   Refills:  0         These medicines have changed or have updated prescriptions.        Dose/Directions    aspirin 81 MG EC tablet   Commonly known as:  ASPIR-LOW   This may have changed:  See the new instructions.   Used for:  Hyperlipidemia LDL goal <130   Changed by:  Vee Marlow MD        Dose:  81 mg   Take 1 tablet (81 mg) by mouth daily   Quantity:  90 tablet   Refills:  3            Where to get your medicines      These medications were sent to [place] [address] [facility] - [place], [place] - [address]  [address] [facility] [address]     Phone:  [phone]     aspirin 81 MG EC tablet    gabapentin 600 MG tablet    vitamin D 98740 UNIT capsule         Some of these will need a paper prescription and others can be bought over the counter.  Ask your nurse if you have questions.     Bring a paper prescription for each of these medications     oxyCODONE-acetaminophen 5-325 MG per tablet                Primary Care Provider Office Phone # Fax #    Vee Marlow [phone]       [facility] [address] [facility] [address]  [facility] [address]        Thank you!     Thank you for choosing [facility]  for your care. Our goal is always to provide you with excellent care. Hearing back from our patients is one way we can continue to improve our services. Please take a few minutes to complete the written survey that you may receive in the mail after your visit with us. Thank you!             Your Updated Medication List - Protect others around you: Learn how to safely use, store and throw away your medicines at www.disposemymeds.org.          This list is accurate as of: 3/10/17 10:48 AM.  Always use your most recent med list.                   Brand Name Dispense Instructions for use    aspirin 81 MG EC tablet    ASPIR-LOW    90 tablet    Take 1 tablet (81 mg) by mouth daily       buPROPion 150 MG 24 hr tablet    WELLBUTRIN XL    30 tablet    Take 1 tablet (150 mg) by mouth every morning       clindamycin 1 % topical gel    CLINDAMAX    60 g    Apply 1 g topically 2 times daily       DIFFERIN 0.1 % cream   Generic drug:  adapalene     30 g    Apply  topically At Bedtime. ANGELO. Will replace Rx for adapalene (DIFFERIN) 0.1 % gel       Ferrous Sulfate Gran      by Misc.(Non-Drug; Combo Route) route.       gabapentin 600 MG tablet    NEURONTIN    270 tablet    Take 1 tablet (600 mg) by mouth 3 times daily       hydrOXYzine 25 MG capsule    VISTARIL    120 capsule    Take 1 capsule (25 mg) by mouth 3 times daily as needed for anxiety       * order for DME     1    one light box for seasonal affective disorder       * order for DME     1 Device    Equipment being ordered: thumb spica wrist brace       oxyCODONE-acetaminophen 5-325 MG per tablet    PERCOCET    45 tablet    Take 1 tablet by mouth three times daily for back pain. Fill on or after 1/28/17.       polyethylene glycol powder    MIRALAX    30 capful    Take 17 g by mouth daily as needed.       * STATIN NOT PRESCRIBED (INTENTIONAL)      by Other route continuous prn.       topiramate 25 MG tablet    TOPAMAX    60 tablet    Take 1 tablet (25 mg) by mouth 2  times daily       vitamin D 2000 UNITS tablet     100 tablet    Take 2,000 Units by mouth daily       vitamin D 92267 UNIT capsule    ERGOCALCIFEROL    12 capsule    Take 1 capsule (50,000 Units) by mouth every 7 days for 12 doses       * Notice:  This list has 3 medication(s) that are the same as other medications prescribed for you. Read the directions carefully, and ask your doctor or other care provider to review them with you.

## 2017-03-10 NOTE — PATIENT INSTRUCTIONS
Make sure to eat 3 meals a day.    Take  1 capsule(50,000 IUs) every 7 days for 12 doses.     Follow up in 6 weeks for check-in.     Follow up with psychiatrist and therapist.

## 2017-03-10 NOTE — PROGRESS NOTES
"3/10/2017 Clinic Care Coordination Contact  Care Team Conversations  Follow up call to pt.   Pt was seen in clinic today.  States she did go to [facility] today to get into a shelter and no one was there.   Assisted pt with a call to the shelter office. [phone]. They stated they have been there all day.  Jina was able to describe the layout.  Pt stated she did \"freak out when I saw a men's and women's bathroom. I cant do that after what I have been through.\" -fearing it was for both sexes. She assumed that location was the shelter she would have to stay in.   Jina agreed to go again tomorrow.  She states she is safe at her friends for now. The shelter office is  open from 1-5:30 on Sat and Sunday.  Also assisted pt. in making a call to [facility], [phone] to request a Mental Health .  Pt provided necessary information.   Spoke with [phone],  who will be her point of contact,    Jina can expect to be contacted by an Adult Access worker in the next 1-2 weeks, then will eventually be assigned an ongoing MH .   The woman we spoke with strongly recommended re visiting the Shelter office. They could not provide any other assistance with housing. Pt was calm and able to participate in the conversation. She feels comfortable with the plan to go to a shelter and have a [facility] CM.     Plan: Pt will visit the shelter office tomorrow and make a plan for housing.  SW will follow up in 2-3 business days. Pt is aware I am out of the office on Monday, has my co workers contact information if needed.    Keely Sparks [facility]  , Clinic Care Coordination  Clinics:  [facility], [place]  [phone]   3/10/2017   2:33 PM.          "

## 2017-03-11 LAB
RPR SER QL: NEGATIVE
T PALLIDUM IGG+IGM SER QL: ABNORMAL

## 2017-03-11 ASSESSMENT — PATIENT HEALTH QUESTIONNAIRE - PHQ9: SUM OF ALL RESPONSES TO PHQ QUESTIONS 1-9: 14

## 2017-03-11 ASSESSMENT — ANXIETY QUESTIONNAIRES: GAD7 TOTAL SCORE: 18

## 2017-03-12 LAB
C TRACH DNA SPEC QL NAA+PROBE: NORMAL
N GONORRHOEA DNA SPEC QL NAA+PROBE: NORMAL
SPECIMEN SOURCE: NORMAL
SPECIMEN SOURCE: NORMAL

## 2017-03-13 LAB
HCV AB SERPL QL IA: NORMAL
HIV 1+2 AB+HIV1 P24 AG SERPL QL IA: NORMAL
T PALLIDUM AB SER QL AGGL: ABNORMAL

## 2017-03-14 ENCOUNTER — OFFICE VISIT (OUTPATIENT)
Dept: PSYCHIATRY | Facility: CLINIC | Age: 57
End: 2017-03-14
Attending: PSYCHOLOGIST
Payer: MEDICAID

## 2017-03-14 ENCOUNTER — TELEPHONE (OUTPATIENT)
Dept: FAMILY MEDICINE | Facility: CLINIC | Age: 57
End: 2017-03-14

## 2017-03-14 ENCOUNTER — CARE COORDINATION (OUTPATIENT)
Dept: CARE COORDINATION | Facility: CLINIC | Age: 57
End: 2017-03-14

## 2017-03-14 DIAGNOSIS — N76.0 BV (BACTERIAL VAGINOSIS): Primary | ICD-10-CM

## 2017-03-14 DIAGNOSIS — F33.1 MAJOR DEPRESSIVE DISORDER, RECURRENT EPISODE, MODERATE (H): Primary | ICD-10-CM

## 2017-03-14 DIAGNOSIS — B96.89 BV (BACTERIAL VAGINOSIS): Primary | ICD-10-CM

## 2017-03-14 PROBLEM — Z86.19 HISTORY OF SYPHILIS: Status: ACTIVE | Noted: 2017-03-14

## 2017-03-14 RX ORDER — METRONIDAZOLE 7.5 MG/G
1 GEL VAGINAL AT BEDTIME
Qty: 70 G | Refills: 0 | Status: SHIPPED | OUTPATIENT
Start: 2017-03-14 | End: 2017-03-19

## 2017-03-14 NOTE — MR AVS SNAPSHOT
After Visit Summary   3/14/2017    Jina Reeves    MRN: [id number]           Patient Information     Date Of Birth          1960        Visit Information        Provider Department      3/14/2017 10:00 AM Sheyla Hawkins, PhD Psychiatry Clinic        Today's Diagnoses     Major depressive disorder, recurrent episode, moderate (H)    -  1       Follow-ups after your visit        Your next 10 appointments already scheduled     Mar 15, 2017  3:00 PM CDT   Adult Med Follow UP with HALIE Ray CNP   Psychiatry Clinic ([facility])    [address]  [address] [facility] [id number] [phone]            Apr 14, 2017  9:20 AM CDT   [facility] Visit with Vee Marlow MD   [facility]    [address] [place] [address] [phone]           Bring a current list of meds and any records pertaining to this visit.  For Physicals, please bring immunization records and any forms needing to be filled out.  Please arrive 10 minutes early to complete paperwork.              Who to contact     Please call your clinic at [phone] to:    Ask questions about your health    Make or cancel appointments    Discuss your medicines    Learn about your test results    Speak to your doctor   If you have compliments or concerns about an experience at your clinic, or if you wish to file a complaint, please contact [facility] Patient Relations at [phone] or email us at [email or web address]         Additional Information About Your Visit        MyChart Information     [employer] gives you secure access to your electronic health record. If you see a primary care provider, you can also send messages to your care team and make appointments. If you have questions, please call your primary care clinic.  If you do not have a primary care  provider, please call [phone] and they will assist you.      [employer] is an electronic gateway that provides easy, online access to your medical records. With [employer], you can request a clinic appointment, read your test results, renew a prescription or communicate with your care team.     To access your existing account, please contact your [facility] or call [phone] for assistance.        Care EveryWhere ID     This is your Care EveryWhere ID. This could be used by other organizations to access your [facility] medical records  [id number]        Your Vitals Were     Last Period                   06/05/2013            Blood Pressure from Last 3 Encounters:   03/10/17 122/78   03/06/17 135/85   03/02/17 135/83    Weight from Last 3 Encounters:   03/10/17 100.7 kg (222 lb 1.6 oz)   03/05/17 101.1 kg (222 lb 12.8 oz)   03/02/17 101.8 kg (224 lb 6.4 oz)              Today, you had the following     No orders found for display         Today's Medication Changes          These changes are accurate as of: 3/14/17  2:54 PM.  If you have any questions, ask your nurse or doctor.               Start taking these medicines.        Dose/Directions    metroNIDAZOLE 0.75 % vaginal gel   Commonly known as:  METROGEL   Used for:  BV (bacterial vaginosis)   Started by:  Vee Marlow MD        Dose:  1 applicator   Place 1 applicator (5 g) vaginally At Bedtime for 5 days   Quantity:  70 g   Refills:  0            Where to get your medicines      These medications were sent to [place] [address] [place] [facility] - [place], [place] - [address]  [address] [address]     Phone:  [phone]     metroNIDAZOLE 0.75 % vaginal gel                Primary Care Provider Office Phone # Fax #    Vee Marlow [phone]       [facility] [address] [place] [address] [address]  [facility] [address]        Thank you!     Thank you for choosing  PSYCHIATRY CLINIC  for your care. Our goal is always to provide you with excellent care. Hearing back from our patients is one way we can continue to improve our services. Please take a few minutes to complete the written survey that you may receive in the mail after your visit with us. Thank you!             Your Updated Medication List - Protect others around you: Learn how to safely use, store and throw away your medicines at www.disposemymeds.org.          This list is accurate as of: 3/14/17  2:54 PM.  Always use your most recent med list.                   Brand Name Dispense Instructions for use    aspirin 81 MG EC tablet    ASPIR-LOW    90 tablet    Take 1 tablet (81 mg) by mouth daily       buPROPion 150 MG 24 hr tablet    WELLBUTRIN XL    30 tablet    Take 1 tablet (150 mg) by mouth every morning       clindamycin 1 % topical gel    CLINDAMAX    60 g    Apply 1 g topically 2 times daily       DIFFERIN 0.1 % cream   Generic drug:  adapalene     30 g    Apply  topically At Bedtime. ANGELO. Will replace Rx for adapalene (DIFFERIN) 0.1 % gel       Ferrous Sulfate Gran      by Misc.(Non-Drug; Combo Route) route.       gabapentin 600 MG tablet    NEURONTIN    270 tablet    Take 1 tablet (600 mg) by mouth 3 times daily       hydrOXYzine 25 MG capsule    VISTARIL    120 capsule    Take 1 capsule (25 mg) by mouth 3 times daily as needed for anxiety       metroNIDAZOLE 0.75 % vaginal gel    METROGEL    70 g    Place 1 applicator (5 g) vaginally At Bedtime for 5 days       * order for DME     1    one light box for seasonal affective disorder       * order for DME     1 Device    Equipment being ordered: thumb spica wrist brace       oxyCODONE-acetaminophen 5-325 MG per tablet    PERCOCET    45 tablet    Take 1 tablet by mouth three times daily for back pain. Fill on or after 1/28/17.       polyethylene glycol powder    MIRALAX    30 capful    Take 17 g by mouth daily as needed.       * STATIN NOT PRESCRIBED  (INTENTIONAL)      by Other route continuous prn.       topiramate 25 MG tablet    TOPAMAX    60 tablet    Take 1 tablet (25 mg) by mouth 2 times daily       vitamin D 2000 UNITS tablet     100 tablet    Take 2,000 Units by mouth daily       vitamin D 61497 UNIT capsule    ERGOCALCIFEROL    12 capsule    Take 1 capsule (50,000 Units) by mouth every 7 days for 12 doses       * Notice:  This list has 3 medication(s) that are the same as other medications prescribed for you. Read the directions carefully, and ask your doctor or other care provider to review them with you.

## 2017-03-14 NOTE — TELEPHONE ENCOUNTER
Notes Recorded by Vee Marlow MD on 3/14/2017 at 11:51 AM  Please call patient regarding results:    A bacterial vaginal infection was found on testing. This is not a STD, rather just an over-growth of bacteria in the vaginal area. This infection can either be treated with an oral antibiotic called metronidazole for 7 days or a vaginal antibiotic that is used at bedtime for 5 days. With both options it will be recommended to avoid alcohol while on the prescriptions as the combination can cause significant nausea and vomiting.     Please update me on her preference for treatment and a prescription will be sent in.     Other results: HIV, hep C, syphillis, chlamydia and gonorrhea tests all neg. We will repeat the syphillis test in one month at her next visit to make sure the titers are staying low.  ------    Notes Recorded by Vee Marlow MD on 3/14/2017 at 11:44 AM  Called and spoke with [facility] regarding her syphillis serology. Results as expected for past infection. Could repeat the rpr in 2-4 weeks to ensure not early infection just starting back but if no sx's, likely results reassuring and just showing past infxn    Pt updated on the above. She would like a cream. Pharmacy attached.  Justina Sheffield RN

## 2017-03-14 NOTE — PROGRESS NOTES
3/14/2017 Clinic Care Coordination Contact  Received a call from pt. She was not happy with [facility]'s shelter system, She did go there, but they only find shelter for 1 night at a time . Still at her friends.  Discussed contacting Transitional housing programs.  Provided pt with several places to call.  She will call me back after talking with them.  She was  Still verbalizing irritation that the hospital SW never came back to help her make a plan for housing. Pt was calm and felt capable of making these calls.    Plan; Pt will contact Transitional Housing programs  SW will follow up with pt in 2-3 business days    Keely Sparks [facility]  , Clinic Care Coordination  Clinics:  [facility], [place]  [phone]   3/14/2017   1:50 PM
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
## 2017-03-15 ENCOUNTER — TELEPHONE (OUTPATIENT)
Dept: FAMILY MEDICINE | Facility: CLINIC | Age: 57
End: 2017-03-15

## 2017-03-15 NOTE — TELEPHONE ENCOUNTER
Medication is going through [facility] - they are going to contact pt and verify work comp insurance info. Will let us know if there are any other issues.    Routing to PCP as FYI. Closing chart for now    Will Donnelly MA

## 2017-03-15 NOTE — TELEPHONE ENCOUNTER
Received form indicating pt's Gabapentin is not covered under her insurance plan. PA or alternative?    Provider: What other medications tried, failed, when and why discontinue?      Prior Authorization Starts (date): 3/15/17**    Insurance Plan: [place] Medicaid  Patient ID: [id number]  RX BIN: [id number]*  Phone: [phone]  Fax: ALESSANDRA  CoverMyMeds Key: NA*  Route it to the team NA  Postponed for 3 business days as protocol.

## 2017-03-15 NOTE — TELEPHONE ENCOUNTER
Is this correct that PA needed for this med. This is unusual. Is it a quantity concern?   Patient is already on med for chronic pain, spinal stenosis, facet arthropathy. She is also on chronic percocet for her pain. She has also used tramadol and ibuprofen 800 mg TID with incomplete control of pain.     This med used to be work comp. I'm not sure if it still is, but may want to confirm it doesn't go through her work comp insurance

## 2017-03-20 ENCOUNTER — CARE COORDINATION (OUTPATIENT)
Dept: CARE COORDINATION | Facility: CLINIC | Age: 57
End: 2017-03-20

## 2017-03-20 ENCOUNTER — OFFICE VISIT (OUTPATIENT)
Dept: PSYCHIATRY | Facility: CLINIC | Age: 57
End: 2017-03-20
Attending: NURSE PRACTITIONER
Payer: MEDICAID

## 2017-03-20 ENCOUNTER — ALLIED HEALTH/NURSE VISIT (OUTPATIENT)
Dept: PSYCHIATRY | Facility: CLINIC | Age: 57
End: 2017-03-20
Attending: SOCIAL WORKER
Payer: MEDICAID

## 2017-03-20 VITALS
DIASTOLIC BLOOD PRESSURE: 88 MMHG | BODY MASS INDEX: 32.22 KG/M2 | SYSTOLIC BLOOD PRESSURE: 133 MMHG | WEIGHT: 218.2 LBS | HEART RATE: 77 BPM

## 2017-03-20 DIAGNOSIS — F33.1 MAJOR DEPRESSIVE DISORDER, RECURRENT EPISODE, MODERATE (H): ICD-10-CM

## 2017-03-20 DIAGNOSIS — Z71.89 COUNSELING AND COORDINATION OF CARE: Primary | ICD-10-CM

## 2017-03-20 PROCEDURE — 99212 OFFICE O/P EST SF 10 MIN: CPT | Mod: ZF

## 2017-03-20 RX ORDER — BUPROPION HYDROCHLORIDE 150 MG/1
150 TABLET ORAL EVERY MORNING
Qty: 30 TABLET | Refills: 1 | Status: SHIPPED | OUTPATIENT
Start: 2017-03-20 | End: 2017-05-31

## 2017-03-20 RX ORDER — TOPIRAMATE 25 MG/1
25 TABLET, FILM COATED ORAL 2 TIMES DAILY
Qty: 60 TABLET | Refills: 1 | Status: SHIPPED | OUTPATIENT
Start: 2017-03-20 | End: 2017-05-31

## 2017-03-20 NOTE — PROGRESS NOTES
3/20/2017 Clinic Care Coordination Contact  [facility]/Voicemail- Social Work follow up.    Referral Source: PCP  Clinical Data: Care Coordinator Outreach  Outreach attempted x 1 as a follow up to last conversation. Left message on voicemail with call back information and requested return call.  Plan:  If no response, Care Coordinator will try to reach patient again in 3-5 business days.    Keely Sparks [facility]  , Clinic Care Coordination  Clinics:  [facility], [place]  [phone]   3/20/2017   3:55 PM

## 2017-03-20 NOTE — PROGRESS NOTES
"  Outpatient Psychiatry Progress Note     Provider: HALIE Nunez CNP  Date: 3/20/2017  Service:  Medication management.   Patient Identification: Jina Reeves  : 1960   MRN: [id number]    Jina Reeves is a 56 year old year old female who presents for ongoing psychiatric care.  Jina Reeves was last seen in clinic on 3/2/17. At that time, she was admitted from the unit to [facility] s/t  with plan, intention and access to overdose on antifreeze and discharged on 3/6/17.    3/20/2017  Today Jina reports her mood has been up and down due to living situation, she went back to living with the same female friend where she has conflict. She has support from her sister to make a deposit on an apartment of her own. She may move back in with her sister.    She tried to get support from [facility] but didn't like the process to call in everywhere.    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 She decided to not accept job due to expectation to lift 40# when client has 10# lifting restriction.    She is working with  in the community \"Keely\" and is hopeful she'll have housing and employment resources. She may consider appealing SSDI with CM assistance.    Psychiatric Review of Systems:  With recent med changes at [place] discharges including Wellbutrin 150mg, Topamax 25mg BID (dry mouth, she continues to take), gabapentin 600mg TID (insurance refused to pay without PA).    She denies active or passive lethality today, voices desire to live for her kids and hope for her future.    She describes hearing two female voices that appear to originate in trauma vs psychosis, will monitor.    Review of Medical Systems:  Appetite: low appetite, denies binge or emotional eating  Sleep: takes two Vistaril most nights; falls asleep well, wakes throughout the night to vivid dreams; chooses to watch TV until she falls back to " sleep   Self Care: encouraged  Housework and hygiene: managing well  Energy: intact  Concentration: intact    Current Substance Use:  Social History     Social History     Marital status:      Spouse name: N/A     Number of children: N/A     Years of education: N/A     Social History Main Topics     Smoking status: Former Smoker     Smokeless tobacco: Never Used      Comment: quit  over 20 yrs ago     Alcohol use Yes     Drug use: No     Sexual activity: Yes     Partners: Male     Other Topics Concern      Service No     Blood Transfusions No     Caffeine Concern No     Occupational Exposure No     Hobby Hazards No     Sleep Concern No     Stress Concern No     Weight Concern Yes     Special Diet No     Back Care No     Exercise Yes     Bike Helmet No     Seat Belt Yes     Self-Exams No     Social History Narrative     She reported to inpatient psychiatrist no cocaine or cannabis in the last two years. She went to a party on the weekend and declined cannabis. She is trying to limit alcohol and move out of friend's home who may abuse alcohol.    Past Medical History:   Past Medical History   Diagnosis Date     Anemia      Cocaine abuse, in remission      ten years since used     Glucose intolerance (pre-diabetes)      a1c6.1 prediabetes     NONSPECIFIC MEDICAL HISTORY      [id number]     NONSPECIFIC MEDICAL HISTORY      IUD in past removed for infection     Obesity, unspecified      Phthirus pubis (pubic louse)      20 yrs ago     Seasonal affective disorder (H)      Medical health update: no update today    Allergies:   Allergies   Allergen Reactions     Vicodin [Hydrocodone-Acetaminophen] Itching        Current Medications     Current Outpatient Prescriptions Ordered in [facility]   Medication Sig Dispense Refill     aspirin (ASPIR-LOW) 81 MG EC tablet Take 1 tablet (81 mg) by mouth daily 90 tablet 3     gabapentin (NEURONTIN) 600 MG tablet Take 1 tablet (600 mg) by mouth 3 times daily 270 tablet 1      oxyCODONE-acetaminophen (PERCOCET) 5-325 MG per tablet Take 1 tablet by mouth three times daily for back pain. Fill on or after 1/28/17. 45 tablet 0     vitamin D (ERGOCALCIFEROL) 00974 UNIT capsule Take 1 capsule (50,000 Units) by mouth every 7 days for 12 doses 12 capsule 0     topiramate (TOPAMAX) 25 MG tablet Take 1 tablet (25 mg) by mouth 2 times daily 60 tablet 0     buPROPion (WELLBUTRIN XL) 150 MG 24 hr tablet Take 1 tablet (150 mg) by mouth every morning 30 tablet 0     hydrOXYzine (VISTARIL) 25 MG capsule Take 1 capsule (25 mg) by mouth 3 times daily as needed for anxiety 120 capsule 2     Cholecalciferol (VITAMIN D) 2000 UNITS tablet Take 2,000 Units by mouth daily 100 tablet 3     clindamycin (CLINDAMAX) 1 % gel Apply 1 g topically 2 times daily 60 g 3     DIFFERIN 0.1 % cream Apply  topically At Bedtime. ANGELO.  Will replace Rx for adapalene (DIFFERIN) 0.1 % gel 30 g 5     Ferrous Sulfate GRAN by Misc.(Non-Drug; Combo Route) route.       ORDER FOR DME Equipment being ordered: thumb spica wrist brace 1 Device 0     polyethylene glycol (MIRALAX) powder Take 17 g by mouth daily as needed. 30 capful 11     STATIN NOT PRESCRIBED, INTENTIONAL, by Other route continuous prn.  0     ORDER FOR DME one light box for seasonal affective disorder 1 0     No current [facility]-ordered facility-administered medications on file.       Mental Status Exam     Appearance:  Casually dressed and Well groomed  Behavior/relationship to examiner/demeanor:  Cooperative, Engaged and Pleasant  Orientation: Oriented to person, place, time and situation  Psychomotor: WNL  Speech Rate:  Normal  Speech Spontaneity:  Normal  Mood:  better  Affect:  Appropriate/mood-congruent  Thought Process (Associations):  Logical, Linear and Goal directed  Thought Content:  no evidence of suicidal or homicidal ideation, no overt psychosis, denies suicidal ideation, intent or thoughts, patient denies auditory and visual hallucinations and patient does not  appear to be responding to internal stimuli  Abnormal Perception:  None  Attention/Concentration:  Normal  Language:  Intact  Insight:  Fair  Judgment:  Adequate for safety    Guarded, pleasant; improved affect.       Results     Vital signs: /88  Pulse 77  Wt 99 kg (218 lb 3.2 oz)  LMP 06/05/2013  BMI 32.22 kg/m2    Laboratory Data:   Lab Results   Component Value Date    WBC 4.8 03/03/2017    HGB 12.4 03/03/2017    HCT 38.3 03/03/2017     03/03/2017    CHOL 176 03/03/2017    TRIG 125 03/03/2017    HDL 56 03/03/2017    ALT 20 03/03/2017    AST 20 03/03/2017     03/06/2017    BUN 11 03/06/2017    CO2 25 03/06/2017    TSH 0.92 03/03/2017         Assessment & Plan      Jina Reeves is seen today for medication management.     Diagnosis  Axis 1: PTSD vs MDD, recurrent, moderate; cannabis and cocaine abuse in remote remission; motivated to taper alcohol  Axis 2: deferred    Plan:  She chooses to continue Wellbutrin XL 150mg qAM (needs, reduced at [place]), topamax 25mg BID (needs), hydroxyzine 25mg BID PRN (has plus RF); she will contact PCP for assistance with PA for gabapenting 600mg TID. She chooses to RTC in 8 weeks, sooner as needed for med visit. Encouraged CM for housing and job assistance; she anticipates meeting with the [facility]'s CM soon. She chooses today to meet with [place] to have housing referral list reprinted before leaving clinic.     She voices understanding of the treatment plan including discussion of options, risks/ benefits. She has clinic contact information and will seek emergency services if urgent or life threatening symptoms present. She understands risks associated with drug and alcohol use.     Over 50% of this time was spent counseling the patient and/or coordinating care regarding review of social and occupational functioning.  In addition patient was counseled on health and wellness practices to augment medication treatment of symptoms. See note for  details.    PHQ-9 score:  17  PHQ-9 SCORE 3/10/2017   Total Score 14     GAD7:   GAIL-7 SCORE 4/29/2016 10/24/2016 3/10/2017   Total Score - - -   Total Score 10 17 18     : 03/2017    Olive Akins, APRN CNP 3/20/2017

## 2017-03-20 NOTE — MR AVS SNAPSHOT
After Visit Summary   3/20/2017    Jina Reeves    MRN: [id number]           Patient Information     Date Of Birth          1960        Visit Information        Provider Department      3/20/2017 11:00 AM Olive Akins APRN [facility]        Today's Diagnoses     Major depressive disorder, recurrent episode, moderate (H)           Follow-ups after your visit        Follow-up notes from your care team     Return in about 8 weeks (around 5/15/2017), or if symptoms worsen or fail to improve.      Your next 10 appointments already scheduled     Mar 21, 2017  8:45 AM CDT   MA SCREENING DIG[facility]IL[facility]AL [id number]    [address] [facility] [address] [phone]           Do not use any powder, lotion or deodorant under your arms or on your breast. If you do, we will ask you to remove it before your exam.  Wear comfortable, two-piece clothing.  If you have any allergies, tell your care team.  Bring any previous mammograms from other facilities or have them mailed to the breast center.            Apr 14, 2017  9:20 AM CDT   Office Visit with Vee Marlow MD   [facility]    [address] [place] [address] [phone]           Bring a current list of meds and any records pertaining to this visit.  For Physicals, please bring immunization records and any forms needing to be filled out.  Please arrive 10 minutes early to complete paperwork.              Who to contact     Please call your clinic at [phone] to:    Ask questions about your health    Make or cancel appointments    Discuss your medicines    Learn about your test results    Speak to your doctor   If you have compliments or concerns about an experience at your clinic, or if you wish to file a complaint, please contact [facility]  Physicians Patient Relations at [phone] or email us at [email or web address]         Additional Information About Your Visit        [email or web address] Information     Tribliot gives you secure access to your electronic health record. If you see a primary care provider, you can also send messages to your care team and make appointments. If you have questions, please call your primary care clinic.  If you do not have a primary care provider, please call [phone] and they will assist you.      Historic Futures is an electronic gateway that provides easy, online access to your medical records. With Historic Futures, you can request a clinic appointment, read your test results, renew a prescription or communicate with your care team.     To access your existing account, please contact your [facility] or call [phone] for assistance.        Care EveryWhere ID     This is your Care EveryWhere ID. This could be used by other organizations to access your [place] medical records  [id number]        Your Vitals Were     Pulse Last Period BMI (Body Mass Index)             77 06/05/2013 32.22 kg/m2          Blood Pressure from Last 3 Encounters:   03/20/17 133/88   03/10/17 122/78   03/06/17 135/85    Weight from Last 3 Encounters:   03/20/17 99 kg (218 lb 3.2 oz)   03/10/17 100.7 kg (222 lb 1.6 oz)   03/05/17 101.1 kg (222 lb 12.8 oz)              Today, you had the following     No orders found for display         Where to get your medicines      These medications were sent to [facility] [address] [facility] [place] - [address]  [address] [address]     Phone:  [phone]     buPROPion 150 MG 24 hr tablet    topiramate 25 MG tablet          Primary Care Provider Office Phone # Fax #    Vee Marlow [phone]       [facility] [address] [place] [address] N  [facility] [address]        Thank you!     Thank you for choosing  PSYCHIATRY CLINIC  for your care. Our goal is always to provide you with excellent care. Hearing back from our patients is one way we can continue to improve our services. Please take a few minutes to complete the written survey that you may receive in the mail after your visit with us. Thank you!             Your Updated Medication List - Protect others around you: Learn how to safely use, store and throw away your medicines at www.disposemymeds.org.          This list is accurate as of: 3/20/17 11:34 AM.  Always use your most recent med list.                   Brand Name Dispense Instructions for use    aspirin 81 MG EC tablet    ASPIR-LOW    90 tablet    Take 1 tablet (81 mg) by mouth daily       buPROPion 150 MG 24 hr tablet    WELLBUTRIN XL    30 tablet    Take 1 tablet (150 mg) by mouth every morning       clindamycin 1 % topical gel    CLINDAMAX    60 g    Apply 1 g topically 2 times daily       DIFFERIN 0.1 % cream   Generic drug:  adapalene     30 g    Apply  topically At Bedtime. ANGELO. Will replace Rx for adapalene (DIFFERIN) 0.1 % gel       Ferrous Sulfate Gran      by Misc.(Non-Drug; Combo Route) route.       gabapentin 600 MG tablet    NEURONTIN    270 tablet    Take 1 tablet (600 mg) by mouth 3 times daily       hydrOXYzine 25 MG capsule    VISTARIL    120 capsule    Take 1 capsule (25 mg) by mouth 3 times daily as needed for anxiety       * order for DME     1    one light box for seasonal affective disorder       * order for DME     1 Device    Equipment being ordered: thumb spica wrist brace       oxyCODONE-acetaminophen 5-325 MG per tablet    PERCOCET    45 tablet    Take 1 tablet by mouth three times daily for back pain. Fill on or after 1/28/17.       polyethylene glycol powder    MIRALAX    30 capful    Take 17 g by mouth daily as needed.       * STATIN NOT PRESCRIBED (INTENTIONAL)      by Other route continuous prn.       topiramate 25 MG tablet    TOPAMAX    60 tablet    Take 1 tablet (25 mg)  by mouth 2 times daily       vitamin D 2000 UNITS tablet     100 tablet    Take 2,000 Units by mouth daily       vitamin D 09057 UNIT capsule    ERGOCALCIFEROL    12 capsule    Take 1 capsule (50,000 Units) by mouth every 7 days for 12 doses       * Notice:  This list has 3 medication(s) that are the same as other medications prescribed for you. Read the directions carefully, and ask your doctor or other care provider to review them with you.

## 2017-03-20 NOTE — MR AVS SNAPSHOT
After Visit Summary   3/20/2017    Jina Reeves    MRN: [id number]           Patient Information     Date Of Birth          1960        Visit Information        Provider Department      3/20/2017 11:35 AM Jemima Ozuna LGSW [facility]        Today's Diagnoses     Counseling and coordination of care    -  1       Follow-ups after your visit        Your next 10 appointments already scheduled     Apr 14, 2017  9:20 AM CDT   Office Visit with [facility]ee [facility]low MD   [facility]    [address] [place] [address] [phone]           Bring a current list of meds and any records pertaining to this visit.  For Physicals, please bring immunization records and any forms needing to be filled out.  Please arrive 10 minutes early to complete paperwork.              Who to contact     Please call your clinic at [phone] to:    Ask questions about your health    Make or cancel appointments    Discuss your medicines    Learn about your test results    Speak to your doctor   If you have compliments or concerns about an experience at your clinic, or if you wish to file a complaint, please contact [facility] Physicians Patient Relations at [phone] or email us at [email or web address]         Additional Information About Your Visit        MyChart Information     Ampulset gives you secure access to your electronic health record. If you see a primary care provider, you can also send messages to your care team and make appointments. If you have questions, please call your primary care clinic.  If you do not have a primary care provider, please call [phone] and they will assist you.      Possibility Space is an electronic gateway that provides easy, online access to your medical records. With Possibility Space, you can request a clinic appointment, read your test results, renew a prescription or communicate with  your care team.     To access your existing account, please contact your [facility] or call [phone] for assistance.        Care EveryWhere ID     This is your Care EveryWhere ID. This could be used by other organizations to access your [place] medical records  [id number]        Your Vitals Were     Last Period                   06/05/2013            Blood Pressure from Last 3 Encounters:   03/20/17 133/88   03/10/17 122/78   03/06/17 135/85    Weight from Last 3 Encounters:   03/20/17 99 kg (218 lb 3.2 oz)   03/10/17 100.7 kg (222 lb 1.6 oz)   03/05/17 101.1 kg (222 lb 12.8 oz)              Today, you had the following     No orders found for display         Where to get your medicines      These medications were sent to [place] [facility] [address] [facility] - [place] - [address]  [address] [facility] [address]     Phone:  [phone]     buPROPion 150 MG 24 hr tablet    topiramate 25 MG tablet          Primary Care Provider Office Phone # Fax #    Vee Marlow [phone]       [address] [address]        Thank you!     Thank you for choosing PSYCHIATRY CLINIC  for your care. Our goal is always to provide you with excellent care. Hearing back from our patients is one way we can continue to improve our services. Please take a few minutes to complete the written survey that you may receive in the mail after your visit with us. Thank you!             Your Updated Medication List - Protect others around you: Learn how to safely use, store and throw away your medicines at www.disposemymeds.org.          This list is accurate as of: 3/20/17 11:59 PM.  Always use your most recent med list.                   Brand Name Dispense Instructions for use    aspirin 81 MG EC tablet    ASPIR-LOW    90 tablet    Take 1 tablet (81 mg) by mouth daily       buPROPion 150 MG 24 hr tablet    WELLBUTRIN XL     30 tablet    Take 1 tablet (150 mg) by mouth every morning       clindamycin 1 % topical gel    CLINDAMAX    60 g    Apply 1 g topically 2 times daily       DIFFERIN 0.1 % cream   Generic drug:  adapalene     30 g    Apply  topically At Bedtime. ANGELO. Will replace Rx for adapalene (DIFFERIN) 0.1 % gel       Ferrous Sulfate Gran      by Misc.(Non-Drug; Combo Route) route.       gabapentin 600 MG tablet    NEURONTIN    270 tablet    Take 1 tablet (600 mg) by mouth 3 times daily       hydrOXYzine 25 MG capsule    VISTARIL    120 capsule    Take 1 capsule (25 mg) by mouth 3 times daily as needed for anxiety       * order for DME     1    one light box for seasonal affective disorder       * order for DME     1 Device    Equipment being ordered: thumb spica wrist brace       oxyCODONE-acetaminophen 5-325 MG per tablet    PERCOCET    45 tablet    Take 1 tablet by mouth three times daily for back pain. Fill on or after 1/28/17.       polyethylene glycol powder    MIRALAX    30 capful    Take 17 g by mouth daily as needed.       * STATIN NOT PRESCRIBED (INTENTIONAL)      by Other route continuous prn.       topiramate 25 MG tablet    TOPAMAX    60 tablet    Take 1 tablet (25 mg) by mouth 2 times daily       vitamin D 2000 UNITS tablet     100 tablet    Take 2,000 Units by mouth daily       vitamin D 54213 UNIT capsule    ERGOCALCIFEROL    12 capsule    Take 1 capsule (50,000 Units) by mouth every 7 days for 12 doses       * Notice:  This list has 3 medication(s) that are the same as other medications prescribed for you. Read the directions carefully, and ask your doctor or other care provider to review them with you.

## 2017-03-21 ENCOUNTER — TELEPHONE (OUTPATIENT)
Dept: FAMILY MEDICINE | Facility: CLINIC | Age: 57
End: 2017-03-21

## 2017-03-21 DIAGNOSIS — Z86.19 HISTORY OF SYPHILIS: Primary | ICD-10-CM

## 2017-03-21 DIAGNOSIS — G89.4 CHRONIC PAIN SYNDROME: ICD-10-CM

## 2017-03-21 DIAGNOSIS — F10.10 ALCOHOL ABUSE, EPISODIC DRINKING BEHAVIOR: ICD-10-CM

## 2017-03-21 DIAGNOSIS — M47.816 FACET ARTHROPATHY, LUMBAR: ICD-10-CM

## 2017-03-21 DIAGNOSIS — M48.061 STENOSIS, SPINAL, LUMBAR: ICD-10-CM

## 2017-03-21 ASSESSMENT — PATIENT HEALTH QUESTIONNAIRE - PHQ9: SUM OF ALL RESPONSES TO PHQ QUESTIONS 1-9: 17

## 2017-03-21 NOTE — TELEPHONE ENCOUNTER
oxycodone      Last Written Prescription Date: 3/10/17  Last Fill Quantity: 45,  # refills: 0   Last Office Visit with [facility], [facility] or [facility] prescribing provider: 3/10/17                                         Next 5 appointments (look out 90 days)     Apr 14, 2017  9:20 AM CDT   [facility] Visit with [facility]   [facility]    [address] [place] [address]   [phone]                  Rell Donnelly MA

## 2017-03-21 NOTE — TELEPHONE ENCOUNTER
Reason for Call:  Other prescription refill request for oxyCODONE-acetaminophen (PERCOCET) 5-325 MG per tablet    Detailed comments: Patient would like to  script on Friday at .  Please call to advise.    Phone Number Patient can be reached at: Home number on file [phone] (home)    Best Time: anytime    Can we leave a detailed message on this number? YES     Thank you,    Call taken on 3/21/2017 at 10:21 AM by Nancy Oneill

## 2017-03-22 ENCOUNTER — CARE COORDINATION (OUTPATIENT)
Dept: CARE COORDINATION | Facility: CLINIC | Age: 57
End: 2017-03-22

## 2017-03-22 RX ORDER — OXYCODONE AND ACETAMINOPHEN 5; 325 MG/1; MG/1
TABLET ORAL
Qty: 45 TABLET | Refills: 0 | Status: SHIPPED | OUTPATIENT
Start: 2017-03-22 | End: 2017-03-31

## 2017-03-22 NOTE — PROGRESS NOTES
3/22/2017 Clinic Care Coordination Contact  [facility]/Voicemail -Social Work     Referral Source: PCP  Clinical Data: Care Coordinator Outreach /  returning pts call.  2 calls made  Outreach attempted x 2.  Left message on Wediviteil with call back information and requested return call.  Information on Calling [facility] ([phone],  to ask about Transitional housing. Recommended    Plan: Care Coordinator will try to reach patient again in 1-2 business days.    Keely Sparks [facility]  , Clinic Care Coordination  Clinics:  Karen Patel [place], [place]  [phone]   3/22/2017   8:45 AM

## 2017-03-22 NOTE — TELEPHONE ENCOUNTER
please place rx at front for  and update patient when completed.   She will need to leave [facility]- please put this on Rx

## 2017-03-22 NOTE — PROGRESS NOTES
Social Work Visit (Brief)  [facility]    Data/Intervention:  Patient Name:  Jina Reeves  /Age:  1960 (56 year old)    Reason for Visit:  Medication recheck with provider and requested list of shelters in [facility]    Collaborated With:    -Patient  -Psychiatry provider, Olive Akins    Resources Provided:  Provided education about accessing shelters in [facility]. Encouraged Jina to go to [facility] to obtain an id card.   Provided Jina with the Adult [facility] Direct line: [phone] where she would need to call to reserve a bed, and listing of local shelters.     Assessment:  Jina reported she knows how to get to [facility] and has been there in the past.    Plan:  Provided patient/family with writer's contact information and availability.   Jina will go to [facility] to obtain an id card and then call the ASC line to reserve a bed as needed.     Will route to patient's psychiatric provider(s) as an FYI.   Please call or page if needs or concerns arise.     IRENE Sorto, [facility]  Clinic   Direct: [phone]    Attestation:    I did not see this patient directly. This patient is discussed with me in individual clinical social work supervision, and I agree with the plan as documented.     IRENE Montoya, [facility], 2017

## 2017-03-23 ENCOUNTER — CARE COORDINATION (OUTPATIENT)
Dept: CARE COORDINATION | Facility: CLINIC | Age: 57
End: 2017-03-23

## 2017-03-23 NOTE — PROGRESS NOTES
3/23/2017 Clinic Care Coordination Contact  Social Work   Received a return call from pt today.  She is staying with a different friend   She assured me she is safe.   She did not call any resources provided for Transitional Housing...Assisted pt in making a conference call to [facility] [phone] to inquire about status of [facility] Case Manger, and to ask about Transitional housing.  They could  not see that she has been assigned anyone. Stating  will help with housing.   We also left a message for the person we make the request to Lucille [phone]. Message for her to contact pt.  She is also still working on her Disability appeal.  Has been told she should get a hearing date in the next 4-5 months.   Question if when she gets a Critical access hospital , if she would be appropriate for the SMRT process.  Pt expressed no immediate needs at this time.     Plan : Pt will follow up on request for Co.  . Will call SW with any needs.    SW to follow up in 2-3 weeks    Keely Sparks [facility]  , Clinic Care Coordination  Clinics:  [facility], [place]  [phone]   3/23/2017   1:20 PM

## 2017-03-24 ENCOUNTER — CARE COORDINATION (OUTPATIENT)
Dept: CARE COORDINATION | Facility: CLINIC | Age: 57
End: 2017-03-24

## 2017-03-24 DIAGNOSIS — G89.4 CHRONIC PAIN SYNDROME: ICD-10-CM

## 2017-03-24 DIAGNOSIS — F10.10 ALCOHOL ABUSE, EPISODIC DRINKING BEHAVIOR: ICD-10-CM

## 2017-03-24 DIAGNOSIS — Z86.19 HISTORY OF SYPHILIS: Primary | ICD-10-CM

## 2017-03-24 LAB
AMPHETAMINES UR QL: ABNORMAL NG/ML
BARBITURATES UR QL SCN: ABNORMAL NG/ML
BENZODIAZ UR QL SCN: ABNORMAL NG/ML
BUPRENORPHINE UR QL: ABNORMAL NG/ML
CANNABINOIDS UR QL: ABNORMAL NG/ML
COCAINE UR QL SCN: ABNORMAL NG/ML
D-METHAMPHET UR QL: ABNORMAL NG/ML
ETHANOL UR QL SCN: NORMAL
METHADONE UR QL SCN: ABNORMAL NG/ML
OPIATES UR QL SCN: ABNORMAL NG/ML
OXYCODONE UR QL SCN: ABNORMAL NG/ML
PCP UR QL SCN: ABNORMAL NG/ML
PROPOXYPH UR QL: ABNORMAL NG/ML
TRICYCLICS UR QL SCN: ABNORMAL NG/ML

## 2017-03-24 PROCEDURE — 80306 DRUG TEST PRSMV INSTRMNT: CPT | Performed by: FAMILY MEDICINE

## 2017-03-24 PROCEDURE — 80320 DRUG SCREEN QUANTALCOHOLS: CPT | Performed by: FAMILY MEDICINE

## 2017-03-24 NOTE — PROGRESS NOTES
3/24/2017 Clinic Care Coordination Contact  Social Work   Call from pt stating she has been assigned a [facility] , Malia.   Unsure of name or #.  She will be meeting with her on Monday 3/27/17.  Pt feels relief that she may now get connected with the services she needs.     Plan: Pt will meet with [facility] to follow up 1-2 weeks     Keely Sparks [facility]  , Clinic Care Coordination  Clinics:  [facility], [place]  [phone]   3/24/2017   3:56 PM

## 2017-03-27 ENCOUNTER — TELEPHONE (OUTPATIENT)
Dept: PSYCHIATRY | Facility: CLINIC | Age: 57
End: 2017-03-27

## 2017-03-27 ENCOUNTER — CARE COORDINATION (OUTPATIENT)
Dept: CARE COORDINATION | Facility: CLINIC | Age: 57
End: 2017-03-27

## 2017-03-27 DIAGNOSIS — Z86.19 HISTORY OF SYPHILIS: ICD-10-CM

## 2017-03-27 PROCEDURE — 36415 COLL VENOUS BLD VENIPUNCTURE: CPT | Performed by: FAMILY MEDICINE

## 2017-03-27 PROCEDURE — 86593 SYPHILIS TEST NON-TREP QUANT: CPT | Performed by: FAMILY MEDICINE

## 2017-03-27 NOTE — PROGRESS NOTES
3/27/2017 Clinic Care Coordination Contact  Care Team Conversations  Social Work   Received a call from Malia Schulte [phone], Access worker from [facility]. She is starting the process for a Mental Health  Case Manger. Jina signed  a release for us to talk. I suggested she might benefit from SMRT process, but based on the information Jina has shared, I am not really sure where she is in the process. Information seems to  change.   Also discussed assessing for MH residential housing or CADI  waivered services.. She will be gathering information from all providers involved.     Plan: Pt will work with  [facility] to assess for services.  SW to follow for support and resources as needed F/U in 10-14 Business days.    Keely Sparks [facility]  , Clinic Care Coordination  Clinics:  [facility], [place]  [phone]   3/27/2017   3:14 PM

## 2017-03-27 NOTE — TELEPHONE ENCOUNTER
Social Work Note: Incoming Call  [facility]    Incoming Call From:  Malia with [facility]  Reason for Call:  [facility] referral for Jina. Malia requesting information from psychiatry provider, Olive Akins.      Response:  Malia will fax request directly to writer who will review and follow-up with Olive Akins as needed. Malia is unable to fax today, but will send by 3/28/17.     Will route to patient's current psychiatric provider(s) as an FYI.   Please call or page with any immediate needs or concerns.    IRENE Sorto, [facility]  Clinic   Direct: [phone]  Fax: [phone]

## 2017-03-29 LAB — RPR SER-TITR: 1 {TITER}

## 2017-03-31 ENCOUNTER — TELEPHONE (OUTPATIENT)
Dept: FAMILY MEDICINE | Facility: CLINIC | Age: 57
End: 2017-03-31

## 2017-03-31 DIAGNOSIS — G89.4 CHRONIC PAIN SYNDROME: ICD-10-CM

## 2017-03-31 DIAGNOSIS — M48.061 STENOSIS, SPINAL, LUMBAR: ICD-10-CM

## 2017-03-31 DIAGNOSIS — M47.816 FACET ARTHROPATHY, LUMBAR: ICD-10-CM

## 2017-03-31 RX ORDER — OXYCODONE AND ACETAMINOPHEN 5; 325 MG/1; MG/1
TABLET ORAL
Qty: 45 TABLET | Refills: 0 | Status: SHIPPED | OUTPATIENT
Start: 2017-03-31 | End: 2017-04-14

## 2017-03-31 NOTE — TELEPHONE ENCOUNTER
Reason for Call:  Medication or medication refill:    Do you use a [facility]?  Name of the pharmacy and phone number for the current request:      Name of the medication requested: oxyCODONE-acetaminophen (PERCOCET) 5-325 MG per tablet DUE FOR NEXT WEEK    Other request: PT HAS QUESTION ABOUT REFILL, PT WON'T BE ABLE TO SEE DR BUCKNER BECAUSE SHE WILL BE ON VACATION    Can we leave a detailed message on this number? YES    Phone number patient can be reached at: Home number on file [phone] (home)    Best Time: ANY    Call taken on 3/31/2017 at 10:37 AM by Marlin Silveira

## 2017-03-31 NOTE — TELEPHONE ENCOUNTER
Refill due 4/7/17- post dated rx to this. Please place at front and update patient.   She should leave [facility] when she picks up RX  She has appt with me 4/14

## 2017-03-31 NOTE — TELEPHONE ENCOUNTER
Percocet      Last Written Prescription Date: 03/22/17  Last Fill Quantity: 45,  # refills: 0   Last Office Visit with [facility], P or [facility] prescribing provider: 03/10/17                                         Next 5 appointments (look out 90 days)     Apr 14, 2017  9:20 AM CDT   Office Visit with Vee Marlow MD   [facility]    [address] [place] [address]   [phone]                    Routing refill request to provider for review/approval because:  Drug not on the [facility] refill protocol   Yanci Francis RN

## 2017-04-03 ENCOUNTER — CARE COORDINATION (OUTPATIENT)
Dept: CARE COORDINATION | Facility: CLINIC | Age: 57
End: 2017-04-03

## 2017-04-03 NOTE — PROGRESS NOTES
Clinic Care Coordination Contact   Social Work   Received a call from pt. asking about her insurance.  Apparently, she gets a termination letter at the end of each month.  Verified her [employer] is active, I suggested she talk with [facility] . Jina shared that the  she met with from [facility]. [place].Malia Schulte [phone] , called and discussed a Transitional Housing shelter in [facility]. Jina thinks she can stay there for 90 days.   She is considering this and will call Malia back today.  Pt will let me know what she decides. Discussed being in a safe place and not having to worry about moving each night. She did not remember the name of the facility. No other needs identified.    Plan: Pt will continue the process to get a [facility] .  SW will follow up in 2 weeks    Keely Sparks [facility]  , Clinic Care Coordination  Clinics:  [facility] Rogers, [place]  [phone]   4/3/2017   11:23 AM

## 2017-04-04 ENCOUNTER — TELEPHONE (OUTPATIENT)
Dept: PSYCHIATRY | Facility: CLINIC | Age: 57
End: 2017-04-04

## 2017-04-04 DIAGNOSIS — E55.9 VITAMIN D DEFICIENCY DISEASE: ICD-10-CM

## 2017-04-04 NOTE — TELEPHONE ENCOUNTER
Social Work Note: Outgoing Call  [facility]    Outgoing Call To: Jina, patient  Reason for Call:  SW returning Jina's call requesting to meet with SW prior to her med management appt with psychiatry provider, Olive Akins, on 4/6/17 in clinic. Writer confirmed availability and scheduled appt for 4/6/17 at 3:10 pm for 20 mins. Jina did not identify reason for SW appt request stating she had just woken up and was unable to think clearly. Jina stated she wrote some concerns down and will bring to appt.       Plan:    Will route to patient's current psychiatric provider(s) as an FYI.     IRENE Sorto, [facility]   Direct: [phone]

## 2017-04-05 ENCOUNTER — TELEPHONE (OUTPATIENT)
Dept: PSYCHIATRY | Facility: CLINIC | Age: 57
End: 2017-04-05

## 2017-04-05 RX ORDER — ERGOCALCIFEROL 1.25 MG/1
CAPSULE, LIQUID FILLED ORAL
Qty: 12 CAPSULE | Refills: 0 | OUTPATIENT
Start: 2017-04-05

## 2017-04-05 NOTE — TELEPHONE ENCOUNTER
Vitamin D 50,000      Last Written Prescription Date: 03/10/17  Last Fill Quantity: 12,  # refills: 0   Last Office Visit with [facility], [facility] or [facility] prescribing provider: 03/10/17                                         Next 5 appointments (look out 90 days)     Apr 14, 2017  9:20 AM CDT   Office Visit with Vee Marlow MD   [facility]    [address] [place] [address]   [phone]                    Prescription was sent to [facility] ALIS [facility]    Request denied.  Too soon to fill.  Yanci Francis RN

## 2017-04-05 NOTE — TELEPHONE ENCOUNTER
"A letter, questionnaire and authorizations to release information were received in [facility] on Monday 4/3/2017.  The signed documents allow  the release of \"personal psychotherapy notes, medical records dated 6/1/2014 to present and personal health related information  from [facility] to Gustavo Chen for the purpose of legal evidence/social security benefits.  I sent each authorization document to medical records and the questionnaire to Olive Akins on 4/5/2017.  I have a copy of all of these documents in Psychiatry until scanning is complete/confirmed.Selma Cooper/CANDE    "

## 2017-04-06 ENCOUNTER — ALLIED HEALTH/NURSE VISIT (OUTPATIENT)
Dept: PSYCHIATRY | Facility: CLINIC | Age: 57
End: 2017-04-06
Attending: SOCIAL WORKER
Payer: COMMERCIAL

## 2017-04-06 ENCOUNTER — OFFICE VISIT (OUTPATIENT)
Dept: PSYCHIATRY | Facility: CLINIC | Age: 57
End: 2017-04-06
Attending: NURSE PRACTITIONER
Payer: COMMERCIAL

## 2017-04-06 VITALS
DIASTOLIC BLOOD PRESSURE: 86 MMHG | HEART RATE: 74 BPM | WEIGHT: 281.6 LBS | BODY MASS INDEX: 41.59 KG/M2 | SYSTOLIC BLOOD PRESSURE: 138 MMHG

## 2017-04-06 DIAGNOSIS — Z71.89 COUNSELING AND COORDINATION OF CARE: Primary | ICD-10-CM

## 2017-04-06 DIAGNOSIS — F33.1 MAJOR DEPRESSIVE DISORDER, RECURRENT EPISODE, MODERATE (H): Primary | ICD-10-CM

## 2017-04-06 PROCEDURE — 99212 OFFICE O/P EST SF 10 MIN: CPT | Mod: ZF

## 2017-04-06 NOTE — MR AVS SNAPSHOT
After Visit Summary   4/6/2017    Jina Reeves    MRN: [id number]           Patient Information     Date Of Birth          1960        Visit Information        Provider Department      4/6/2017 3:30 PM Olive Akins, [facility]        Today's Diagnoses     Major depressive disorder, recurrent episode, moderate (H)    -  1       Follow-ups after your visit        Follow-up notes from your care team     Return in about 4 weeks (around 5/4/2017), or if symptoms worsen or fail to improve.      Your next 10 appointments already scheduled     Apr 08, 2017 12:45 PM CDT   MA SCREENING DIGITAL BILATERAL with [id number]   [facility]    [address] [address] [phone]           Do not use any powder, lotion or deodorant under your arms or on your breast. If you do, we will ask you to remove it before your exam.  Wear comfortable, two-piece clothing.  If you have any allergies, tell your care team.  Bring any previous mammograms from other facilities or have them mailed to the breast center.            Apr 14, 2017  9:20 AM CDT   Office Visit with Vee Marlow MD   [facility]    [address] [place] [id number] [phone]           Bring a current list of meds and any records pertaining to this visit.  For Physicals, please bring immunization records and any forms needing to be filled out.  Please arrive 10 minutes early to complete paperwork.              Who to contact     Please call your clinic at [phone] to:    Ask questions about your health    Make or cancel appointments    Discuss your medicines    Learn about your test results    Speak to your doctor   If you have compliments or concerns about an experience at your clinic, or if you wish to file a complaint, please contact [facility]  Physicians Patient Relations at [phone] or email us at [email or web address]         Additional Information About Your Visit        Av[employer] Information     Kickit Witht gives you secure access to your electronic health record. If you see a primary care provider, you can also send messages to your care team and make appointments. If you have questions, please call your primary care clinic.  If you do not have a primary care provider, please call [phone] and they will assist you.      [employer]pe is an electronic gateway that provides easy, online access to your medical records. With [employer], you can request a clinic appointment, read your test results, renew a prescription or communicate with your care team.     To access your existing account, please contact your [facility] or call [phone] for assistance.        Care EveryWhere ID     This is your Care EveryWhere ID. This could be used by other organizations to access your [place] medical records  [id number]        Your Vitals Were     Pulse Last Period BMI (Body Mass Index)             74 06/05/2013 41.59 kg/m2          Blood Pressure from Last 3 Encounters:   04/06/17 138/86   03/20/17 133/88   03/10/17 122/78    Weight from Last 3 Encounters:   04/06/17 127.7 kg (281 lb 9.6 oz)   03/20/17 99 kg (218 lb 3.2 oz)   03/10/17 100.7 kg (222 lb 1.6 oz)              Today, you had the following     No orders found for display       Primary Care Provider Office Phone # Fax #    Vee Marlow [phone]       [facility] [address] [place] [address] N  [facility] [address]        Thank you!     Thank you for choosing PSYCHIATRY CLINIC  for your care. Our goal is always to provide you with excellent care. Hearing back from our patients is one way we can continue to improve our services. Please take a few minutes to complete the written survey that you may receive in the mail after your visit  with us. Thank you!             Your Updated Medication List - Protect others around you: Learn how to safely use, store and throw away your medicines at www.disposemymeds.org.          This list is accurate as of: 4/6/17  4:16 PM.  Always use your most recent med list.                   Brand Name Dispense Instructions for use    aspirin 81 MG EC tablet    ASPIR-LOW    90 tablet    Take 1 tablet (81 mg) by mouth daily       buPROPion 150 MG 24 hr tablet    WELLBUTRIN XL    30 tablet    Take 1 tablet (150 mg) by mouth every morning       clindamycin 1 % topical gel    CLINDAMAX    60 g    Apply 1 g topically 2 times daily       DIFFERIN 0.1 % cream   Generic drug:  adapalene     30 g    Apply  topically At Bedtime. ANGELO. Will replace Rx for adapalene (DIFFERIN) 0.1 % gel       Ferrous Sulfate Gran      by Misc.(Non-Drug; Combo Route) route.       gabapentin 600 MG tablet    NEURONTIN    270 tablet    Take 1 tablet (600 mg) by mouth 3 times daily       hydrOXYzine 25 MG capsule    VISTARIL    120 capsule    Take 1 capsule (25 mg) by mouth 3 times daily as needed for anxiety       * order for DME     1    one light box for seasonal affective disorder       * order for DME     1 Device    Equipment being ordered: thumb spica wrist brace       oxyCODONE-acetaminophen 5-325 MG per tablet    PERCOCET    45 tablet    Take 1 tablet by mouth three times daily for back pain. Fill on or after 4/7/17.       polyethylene glycol powder    MIRALAX    30 capful    Take 17 g by mouth daily as needed.       * STATIN NOT PRESCRIBED (INTENTIONAL)      by Other route continuous prn.       topiramate 25 MG tablet    TOPAMAX    60 tablet    Take 1 tablet (25 mg) by mouth 2 times daily       vitamin D 2000 UNITS tablet     100 tablet    Take 2,000 Units by mouth daily       vitamin D 10116 UNIT capsule    ERGOCALCIFEROL    12 capsule    Take 1 capsule (50,000 Units) by mouth every 7 days for 12 doses       * Notice:  This list has 3  medication(s) that are the same as other medications prescribed for you. Read the directions carefully, and ask your doctor or other care provider to review them with you.

## 2017-04-06 NOTE — PROGRESS NOTES
"  Outpatient Psychiatry Progress Note     Provider: HALIE Nunez CNP  Date: 2017  Service:  Medication management.   Patient Identification: Jina Reeves  : 1960   MRN: [id number]    Jina Reeves is a 56 year old year old female who presents for ongoing psychiatric care.  Jina was last seen in clinic on 3/20/17.   At that time, she chose to continue Wellbutrin, topomax, hydroxyzine following changes at [facility].    Daily med compliance.    2017  Today Jina reports recent stressors include being homeless and not knowing how to cope. She worries about her belongings in storage when she doesn't have an income source.    Between visits, she has lived with four sets of safe people, some of whom are friends of hers. COLT Onofre is involved and request for CM referral has been placed. She met with Jemima before med visit and is working to complete paperwork. She continues working with COLT Riggs who is looking to place her with a mental health worker who may help with transitional housing.    She has options to stay with a friend but could not contribute with housework to \"pay\" her way for a place to stay. Her friend gave her a little money for groceries. She has an option to stay with a friend tonight. She describes side effects of medication include dry mouth that drinking water helps.    Past report of two female voices appears to originate in past trauma, will monitor.    She stays in touch with her kids who all live out of state.    Psychiatric Review of Systems:  She attributes the stress of being homeless as worsening her mood and increasing racing thoughts at night interrupt sleep.     Review of Medical Systems:  Appetite: \"eating emotionally\" the last couple days when she'd like eat healthy  Sleep: racing thoughts delay sleep onset; once asleep, sleeps 3 hours and wakes to leg and back pain; is not comfortable sleeping in unfamiliar homes; has dreams of drug use that scares " her.    Self Care: encouraged  Housework and hygiene: homeless has prohibited her from bathing regularly, back pain limits how much housework she can accomplish, even if undertaken to help provide her a home in a friend's home    Energy: adequate  Concentration: intact    Current Substance Use:  Social History     Social History     Marital status:      Spouse name: N/A     Number of children: N/A     Years of education: N/A     Social History Main Topics     Smoking status: Former Smoker     Smokeless tobacco: Never Used      Comment: quit  over 20 yrs ago     Alcohol use Yes     Drug use: No     Sexual activity: Yes     Partners: Male     Other Topics Concern      Service No     Blood Transfusions No     Caffeine Concern No     Occupational Exposure No     Hobby Hazards No     Sleep Concern No     Stress Concern No     Weight Concern Yes     Special Diet No     Back Care No     Exercise Yes     Bike Helmet No     Seat Belt Yes     Self-Exams No     Social History Narrative     Motivated to continue sobriety, limiting alcohol; sober from cannabis and cocaine over the last 3-4 years. Varying reports on substance use. She continues to decline drugs offered to her. UDS on 3/24/17.    Past Medical History:   Past Medical History:   Diagnosis Date     Anemia      Cocaine abuse, in remission     ten years since used     Glucose intolerance (pre-diabetes)     a1c6.1 prediabetes     NONSPECIFIC MEDICAL HISTORY     [id number]     NONSPECIFIC MEDICAL HISTORY     IUD in past removed for infection     Obesity, unspecified      Phthirus pubis (pubic louse)     20 yrs ago     Seasonal affective disorder (H)      Medical health update: mammogram set for Sat, cholesterol levels within normal limits; may repeat recent vit D    Allergies:   Allergies   Allergen Reactions     Vicodin [Hydrocodone-Acetaminophen] Itching        Current Medications     Current Outpatient Prescriptions Ordered in [facility]   Medication Sig Dispense  "Refill     oxyCODONE-acetaminophen (PERCOCET) 5-325 MG per tablet Take 1 tablet by mouth three times daily for back pain. Fill on or after 4/7/17. 45 tablet 0     topiramate (TOPAMAX) 25 MG tablet Take 1 tablet (25 mg) by mouth 2 times daily 60 tablet 1     buPROPion (WELLBUTRIN XL) 150 MG 24 hr tablet Take 1 tablet (150 mg) by mouth every morning 30 tablet 1     aspirin (ASPIR-LOW) 81 MG EC tablet Take 1 tablet (81 mg) by mouth daily 90 tablet 3     gabapentin (NEURONTIN) 600 MG tablet Take 1 tablet (600 mg) by mouth 3 times daily 270 tablet 1     vitamin D (ERGOCALCIFEROL) 26089 UNIT capsule Take 1 capsule (50,000 Units) by mouth every 7 days for 12 doses 12 capsule 0     hydrOXYzine (VISTARIL) 25 MG capsule Take 1 capsule (25 mg) by mouth 3 times daily as needed for anxiety 120 capsule 2     Cholecalciferol (VITAMIN D) 2000 UNITS tablet Take 2,000 Units by mouth daily 100 tablet 3     clindamycin (CLINDAMAX) 1 % gel Apply 1 g topically 2 times daily 60 g 3     DIFFERIN 0.1 % cream Apply  topically At Bedtime. ANGELO.  Will replace Rx for adapalene (DIFFERIN) 0.1 % gel 30 g 5     Ferrous Sulfate GRAN by Misc.(Non-Drug; Combo Route) route.       ORDER FOR DME Equipment being ordered: thumb spica wrist brace 1 Device 0     polyethylene glycol (MIRALAX) powder Take 17 g by mouth daily as needed. 30 capful 11     STATIN NOT PRESCRIBED, INTENTIONAL, by Other route continuous prn.  0     ORDER FOR DME one light box for seasonal affective disorder 1 0     No current [facility]-ordered facility-administered medications on file.       Mental Status Exam     Appearance:  Formally dressed and Well groomed  Behavior/relationship to examiner/demeanor:  Cooperative, Engaged and Pleasant  Orientation: Oriented to person, place, time and situation  Psychomotor: WNL  Speech Rate:  Normal  Speech Spontaneity:  Normal  Mood:  \"fine\"  Affect:  Appropriate/mood-congruent and Subdued  Thought Process (Associations):  Goal directed  Thought " Content:  no evidence of suicidal or homicidal ideation, no overt psychosis, denies suicidal ideation, intent or thoughts, patient denies auditory and visual hallucinations and patient does not appear to be responding to internal stimuli  Abnormal Perception:  None  Attention/Concentration:  Normal  Language:  Intact  Insight:  Limited  Judgment:  Adequate for safety      Results     Vital signs: /86  Pulse 74  Wt 127.7 kg (281 lb 9.6 oz)  LMP 06/05/2013  BMI 41.59 kg/m2    Laboratory Data:   Lab Results   Component Value Date    WBC 4.8 03/03/2017    HGB 12.4 03/03/2017    HCT 38.3 03/03/2017     03/03/2017    CHOL 176 03/03/2017    TRIG 125 03/03/2017    HDL 56 03/03/2017    ALT 20 03/03/2017    AST 20 03/03/2017     03/06/2017    BUN 11 03/06/2017    CO2 25 03/06/2017    TSH 0.92 03/03/2017         Assessment & Plan      Jina DARIEN Reeves is seen today for medication management.     Diagnosis  Axis 1: PTSD vs MDD; cannabis and cocaine in remote remission  Axis 2: deferred    Plan:  She chooses to continue current regimen including Wellbutrin XL 150mg qAM, topamax 25mg BID, hydroxyzine 25mg BID PRN (has all). Discussed ASE with current regimen. She has a plan to work with Keely while Jemima is on vacation for LCSW needs. Writer will discuss paperwork submitted for SSDI with consulting MD and call client next week. She will consider therapy options as she is processing trauma from childhood.    She voices understanding of the treatment plan including discussion of options, risks/ benefits. She has clinic contact information and will seek emergency services if urgent or life threatening symptoms present. She understands risks associated with drug and alcohol use.     Over 50% of this time was spent counseling the patient and/or coordinating care regarding review of social and occupational functioning.  In addition patient was counseled on health and wellness practices to augment medication  treatment of symptoms. See note for details.    PHQ-9 score:    PHQ-9 SCORE 3/20/2017   Total Score 17     GAD7:   GAIL-7 SCORE 4/29/2016 10/24/2016 3/10/2017   Total Score - - -   Total Score 10 17 18     : 03/2017    Olive Akins, APRN CNP 4/6/2017

## 2017-04-06 NOTE — MR AVS SNAPSHOT
After Visit Summary   4/6/2017    Jina Reeves    MRN: [id number]           Patient Information     Date Of Birth          1960        Visit Information        Provider Department      4/6/2017 3:10 PM Jemima Ozuna LGSW [facility]        Today's Diagnoses     Counseling and coordination of care    -  1       Follow-ups after your visit        Your next 10 appointments already scheduled     Apr 08, 2017 12:45 PM CDT   MA SCREENING DIGITAL BILATERAL with [id number]   [facility] ([facility])    [address] [facility] [address] [phone]           Do not use any powder, lotion or deodorant under your arms or on your breast. If you do, we will ask you to remove it before your exam.  Wear comfortable, two-piece clothing.  If you have any allergies, tell your care team.  Bring any previous mammograms from other facilities or have them mailed to the breast center.            Apr 14, 2017  9:20 AM CDT   Office Visit with Vee Marlow MD   [facility] ([facility]    [address] [place] [address] [phone]           Bring a current list of meds and any records pertaining to this visit.  For Physicals, please bring immunization records and any forms needing to be filled out.  Please arrive 10 minutes early to complete paperwork.            May 05, 2017  1:00 PM CDT   SOCIAL WORK with RODRICK Sorto   Psychiatry Clinic ([facility])    [address] [facility] [address] [phone]              Who to contact     Please call your clinic at [phone] to:    Ask questions about your health    Make or cancel appointments    Discuss your medicines    Learn about your test results    Speak to your doctor   If you have compliments or concerns about an experience at your clinic, or if you wish to file a  complaint, please contact [facility] Patient Relations at [phone] or email us at [email or web address]         Additional Information About Your Visit        [employer] Information     [employer] gives you secure access to your electronic health record. If you see a primary care provider, you can also send messages to your care team and make appointments. If you have questions, please call your primary care clinic.  If you do not have a primary care provider, please call [phone] and they will assist you.      [employer] is an electronic gateway that provides easy, online access to your medical records. With [employer], you can request a clinic appointment, read your test results, renew a prescription or communicate with your care team.     To access your existing account, please contact your [facility] or call [phone] for assistance.        Care EveryWhere ID     This is your Care EveryWhere ID. This could be used by other organizations to access your [facility] medical records  [id number]        Your Vitals Were     Last Period                   06/05/2013            Blood Pressure from Last 3 Encounters:   04/06/17 138/86   03/20/17 133/88   03/10/17 122/78    Weight from Last 3 Encounters:   04/06/17 127.7 kg (281 lb 9.6 oz)   03/20/17 99 kg (218 lb 3.2 oz)   03/10/17 100.7 kg (222 lb 1.6 oz)              Today, you had the following     No orders found for display       Primary Care Provider Office Phone # Fax #    Vee Marlow [phone]       [facility] [address] [place] [address] [address]  [facility] [address]        Thank you!     Thank you for choosing PSYCHIATRY CLINIC  for your care. Our goal is always to provide you with excellent care. Hearing back from our patients is one way we can continue to improve our services. Please take a few minutes to complete the written survey that you may receive in the mail after  your visit with us. Thank you!             Your Updated Medication List - Protect others around you: Learn how to safely use, store and throw away your medicines at www.disposemymeds.org.          This list is accurate as of: 4/6/17 11:59 PM.  Always use your most recent med list.                   Brand Name Dispense Instructions for use    aspirin 81 MG EC tablet    ASPIR-LOW    90 tablet    Take 1 tablet (81 mg) by mouth daily       buPROPion 150 MG 24 hr tablet    WELLBUTRIN XL    30 tablet    Take 1 tablet (150 mg) by mouth every morning       clindamycin 1 % topical gel    CLINDAMAX    60 g    Apply 1 g topically 2 times daily       DIFFERIN 0.1 % cream   Generic drug:  adapalene     30 g    Apply  topically At Bedtime. ANGELO. Will replace Rx for adapalene (DIFFERIN) 0.1 % gel       Ferrous Sulfate Gran      by Misc.(Non-Drug; Combo Route) route.       gabapentin 600 MG tablet    NEURONTIN    270 tablet    Take 1 tablet (600 mg) by mouth 3 times daily       hydrOXYzine 25 MG capsule    VISTARIL    120 capsule    Take 1 capsule (25 mg) by mouth 3 times daily as needed for anxiety       * order for DME     1    one light box for seasonal affective disorder       * order for DME     1 Device    Equipment being ordered: thumb spica wrist brace       oxyCODONE-acetaminophen 5-325 MG per tablet    PERCOCET    45 tablet    Take 1 tablet by mouth three times daily for back pain. Fill on or after 4/7/17.       polyethylene glycol powder    MIRALAX    30 capful    Take 17 g by mouth daily as needed.       * STATIN NOT PRESCRIBED (INTENTIONAL)      by Other route continuous prn.       topiramate 25 MG tablet    TOPAMAX    60 tablet    Take 1 tablet (25 mg) by mouth 2 times daily       vitamin D 2000 UNITS tablet     100 tablet    Take 2,000 Units by mouth daily       vitamin D 79996 UNIT capsule    ERGOCALCIFEROL    12 capsule    Take 1 capsule (50,000 Units) by mouth every 7 days for 12 doses       * Notice:  This list  has 3 medication(s) that are the same as other medications prescribed for you. Read the directions carefully, and ask your doctor or other care provider to review them with you.

## 2017-04-07 ENCOUNTER — CARE COORDINATION (OUTPATIENT)
Dept: CARE COORDINATION | Facility: CLINIC | Age: 57
End: 2017-04-07

## 2017-04-07 ENCOUNTER — TELEPHONE (OUTPATIENT)
Dept: PSYCHIATRY | Facility: CLINIC | Age: 57
End: 2017-04-07

## 2017-04-07 NOTE — PROGRESS NOTES
"Social Work Visit (Brief)  [facility]    Data/Intervention:  Patient Name:  Jina Reeves  /Age:  1960 (56 year old)    Reason for Visit:  Jina in clinic today for Psychiatry appt with Olive Akins. Jina requested to meet with JESIKA prior to this appt to address some \"concerns.\"     Collaborated With:    -Jina, pt  -Disability  with [facility] and [employer]    Met individually with Jina. Jina stated she had questions about forms she received from her  at [facility] and [facility] (law firm based in [facility]). Jina hired  that her brother referred her to to assist her with filing disability appeal. SW reviewed documents and assisted Jina with interpretation of instructions. JESIKA unsure about one of the questions and suggested calling  to clarify, which she was receptive to. SW assisted Jina with calling 's office and [facility] for . SW coached Jina what to ask  when they call her back.     SW reviewed  is charging fee based on percentage of her SSI.      Plan:  Provided patient/family with writer's contact information and availability.   Writer and Jina scheduled appt to meet in clinic on 17 to work on writing a letter stating she is homeless and requesting that her application is expedited.     Will route to patient's psychiatric provider(s) as an FYI.   Please call or page if needs or concerns arise.     IRENE Sorto, [facility]   Direct: [phone]    Attestation:    I did not see this patient directly. This patient is discussed with me in individual clinical social work supervision, and I agree with the plan as documented.     IRENE Montoya, [facility], 2017    "

## 2017-04-07 NOTE — PROGRESS NOTES
4/7/2017 Clinic Care Coordination Contact  Social Work   Received a call from pt. She is now back living with her friend in [place].    Pt stated she did meet with a SW at the Psychiatry office. Seems confused about papers for disability . It appears they are discussing and will complete the papers.   She states she is safe were she is living;.  Unclear about the transitional housing offered by [facility]. We made a conference call  to Malia Schulte, [phone], asking for clarification.  Waiting for return call.     Plan: Clarify housing option offered.  SW to f/u in 7-10  business days.    Keely Sparks [facility]  , Clinic Care Coordination  Clinics:  Karen Patel Rogers, [place]  [phone]   4/7/2017   4:40 PM

## 2017-04-12 ENCOUNTER — CARE COORDINATION (OUTPATIENT)
Dept: CARE COORDINATION | Facility: CLINIC | Age: 57
End: 2017-04-12

## 2017-04-12 NOTE — PROGRESS NOTES
4/12/2017 Clinic Care Coordination Contact  Social Work   Follow up call to pt. Both of us have spoken with there [facility] , Jelly Cadet [phone].  She had given Jina the number for an IR transitional housing program, [facility], in [facility].  She states she knows the woman who is the  and wouldn't feel comfortable being there.  I shared that when I spoke with Malia she stated she had received the necessary paperwork and a  will be assigned in the near future.  Discussed waiting for her [facility]  to find her Appropriated Transitional Housing. She continues to stay with her friend. Feels. safe and welcome for now.    Aware we need to help her find housing in the near future. She is waiting for a call from the Psychiatry office to find out if they have been able to finalize the paper work for her disability.  Asked if she has the basics, ie: food.  Does not have what she really likes, Reminded her about using the food shelf. She has not thought of that . Will call now to make an appt at [facility].     Plan: pt will go to [employer]. Wait for [facility]  to be assigned.  SW will follow up in  2-3 weeks    Keely Sparks [facility]  , Clinic Care Coordination  Clinics:  [facility] Rogers, [place]  [phone]   4/12/2017   4:15 PM

## 2017-04-12 NOTE — TELEPHONE ENCOUNTER
Mental Impairment Questionnaire completed and forwarded by Olive AMBROSE CNP.  Per msg, this has been copied and sent to scanning.    Faxed forms to Wiliam Walker at [facility] and [employer] at [phone]    Will temporarily hold onto forms until scanning is verified.

## 2017-04-13 ENCOUNTER — RADIANT APPOINTMENT (OUTPATIENT)
Dept: MAMMOGRAPHY | Facility: CLINIC | Age: 57
End: 2017-04-13
Attending: FAMILY MEDICINE
Payer: COMMERCIAL

## 2017-04-13 DIAGNOSIS — Z12.31 VISIT FOR SCREENING MAMMOGRAM: ICD-10-CM

## 2017-04-13 PROCEDURE — G0202 SCR MAMMO BI INCL CAD: HCPCS | Performed by: RADIOLOGY

## 2017-04-14 ENCOUNTER — OFFICE VISIT (OUTPATIENT)
Dept: FAMILY MEDICINE | Facility: CLINIC | Age: 57
End: 2017-04-14
Payer: COMMERCIAL

## 2017-04-14 VITALS
SYSTOLIC BLOOD PRESSURE: 110 MMHG | BODY MASS INDEX: 32.33 KG/M2 | DIASTOLIC BLOOD PRESSURE: 86 MMHG | WEIGHT: 218.3 LBS | HEART RATE: 94 BPM | OXYGEN SATURATION: 97 % | RESPIRATION RATE: 12 BRPM | TEMPERATURE: 98.1 F | HEIGHT: 69 IN

## 2017-04-14 DIAGNOSIS — F33.1 MAJOR DEPRESSIVE DISORDER, RECURRENT EPISODE, MODERATE (H): Primary | ICD-10-CM

## 2017-04-14 DIAGNOSIS — E55.9 VITAMIN D DEFICIENCY: ICD-10-CM

## 2017-04-14 DIAGNOSIS — F10.10 ALCOHOL ABUSE, EPISODIC DRINKING BEHAVIOR: ICD-10-CM

## 2017-04-14 DIAGNOSIS — M48.061 STENOSIS, SPINAL, LUMBAR: ICD-10-CM

## 2017-04-14 DIAGNOSIS — F14.11 COCAINE ABUSE IN REMISSION (H): ICD-10-CM

## 2017-04-14 DIAGNOSIS — Z86.19 HISTORY OF SYPHILIS: ICD-10-CM

## 2017-04-14 DIAGNOSIS — M47.816 FACET ARTHROPATHY, LUMBAR: ICD-10-CM

## 2017-04-14 DIAGNOSIS — Z12.31 VISIT FOR SCREENING MAMMOGRAM: ICD-10-CM

## 2017-04-14 DIAGNOSIS — Z12.11 SCREEN FOR COLON CANCER: ICD-10-CM

## 2017-04-14 DIAGNOSIS — G89.4 CHRONIC PAIN SYNDROME: ICD-10-CM

## 2017-04-14 LAB — ETHANOL UR QL SCN: NORMAL

## 2017-04-14 PROCEDURE — 99214 OFFICE O/P EST MOD 30 MIN: CPT | Performed by: FAMILY MEDICINE

## 2017-04-14 PROCEDURE — 80320 DRUG SCREEN QUANTALCOHOLS: CPT | Performed by: FAMILY MEDICINE

## 2017-04-14 PROCEDURE — 36415 COLL VENOUS BLD VENIPUNCTURE: CPT | Performed by: FAMILY MEDICINE

## 2017-04-14 PROCEDURE — 86593 SYPHILIS TEST NON-TREP QUANT: CPT | Performed by: FAMILY MEDICINE

## 2017-04-14 PROCEDURE — 80306 DRUG TEST PRSMV INSTRMNT: CPT | Performed by: FAMILY MEDICINE

## 2017-04-14 RX ORDER — OXYCODONE AND ACETAMINOPHEN 5; 325 MG/1; MG/1
TABLET ORAL
Qty: 90 TABLET | Refills: 0 | Status: SHIPPED | OUTPATIENT
Start: 2017-04-14 | End: 2017-05-10

## 2017-04-14 RX ORDER — MULTIPLE VITAMINS W/ MINERALS TAB 9MG-400MCG
1 TAB ORAL DAILY
Qty: 100 TABLET | Refills: 3 | Status: SHIPPED | OUTPATIENT
Start: 2017-04-14

## 2017-04-14 ASSESSMENT — ANXIETY QUESTIONNAIRES
IF YOU CHECKED OFF ANY PROBLEMS ON THIS QUESTIONNAIRE, HOW DIFFICULT HAVE THESE PROBLEMS MADE IT FOR YOU TO DO YOUR WORK, TAKE CARE OF THINGS AT HOME, OR GET ALONG WITH OTHER PEOPLE: NOT DIFFICULT AT ALL
2. NOT BEING ABLE TO STOP OR CONTROL WORRYING: MORE THAN HALF THE DAYS
5. BEING SO RESTLESS THAT IT IS HARD TO SIT STILL: MORE THAN HALF THE DAYS
7. FEELING AFRAID AS IF SOMETHING AWFUL MIGHT HAPPEN: NEARLY EVERY DAY
GAD7 TOTAL SCORE: 18
3. WORRYING TOO MUCH ABOUT DIFFERENT THINGS: NEARLY EVERY DAY
6. BECOMING EASILY ANNOYED OR IRRITABLE: MORE THAN HALF THE DAYS
1. FEELING NERVOUS, ANXIOUS, OR ON EDGE: NEARLY EVERY DAY

## 2017-04-14 ASSESSMENT — PATIENT HEALTH QUESTIONNAIRE - PHQ9: 5. POOR APPETITE OR OVEREATING: NEARLY EVERY DAY

## 2017-04-14 NOTE — PATIENT INSTRUCTIONS
Continue with high dose VitaminD and finish course.     Start taking daily multivitamin    Follow up in 3 months for check-in.

## 2017-04-14 NOTE — PROGRESS NOTES
SUBJECTIVE:                                                    Jina Reeves is a 56 year old female who presents to clinic today for the following health issues:        Depression and Anxiety Follow-Up    Status since last visit: No change    Other associated symptoms:None    Complicating factors:     Significant life event: No     Current substance abuse: None    PHQ-9 SCORE 4/29/2016 10/24/2016 3/10/2017   Total Score - - -   Total Score 11 23 14   Some encounter information is confidential and restricted. Go to Review Flowsheets activity to see all data.     GAIL-7 SCORE 4/29/2016 10/24/2016 3/10/2017   Total Score - - -   Total Score 10 17 18        PHQ-9  English      PHQ-9   Any Language     GAD7       Chronic Pain Follow-Up       Type / Location of Pain: Lower Back and LT leg  Analgesia/pain control:       Recent changes:  same      Overall control: Tolerable with discomfort  Activity level/function:      Daily activities:  Unable to perform most daily activities - chores, hobbies, social activities, driving and Able to do light housework, cooking    Work:  not applicable  Adverse effects:  No  Adherance    Taking medication as directed?  Yes    Participating in other treatments: no -   Risk Factors:    Sleep:  Poor    Mood/anxiety:  controlled    Recent family or social stressors:  none noted    Other aggravating factors: prolonged standing  PHQ-9 SCORE 4/29/2016 10/24/2016 3/10/2017   Total Score - - -   Total Score 11 23 14   Some encounter information is confidential and restricted. Go to Review [employer] activity to see all data.     GAIL-7 SCORE 4/29/2016 10/24/2016 3/10/2017   Total Score - - -   Total Score 10 17 18     Encounter-Level CSA - 09/29/2015:                 Controlled Substance Agreement - Scan on 10/5/2015  4:04 PM : CONTROLLED SUBSTANCE AGREEMENT (below)                 Amount of exercise or physical activity: None    Problems taking medications regularly: No    Medication side  "effects: none    Diet: regular (no restrictions)      Problem list and histories reviewed & adjusted, as indicated.  Additional history: as documented    Jina reports she has a mild cold with sore throat, rhinorrhea and cough. denies fever.  Other than that she states she is doing  ok, things are at baseline since last visit.Last Psychiatrist appt.was 4/6 and her Wellbutrin was lowered. Notes that her mood is 'so so\". She is worrying a lot about her finances and home and having difficult sleeping as her mind is racing and remembering things from her childhood. She is staying with a friend and is safe there but is having some difficult adjusting. She is appealing and reapplying for disability.She is still working with . Denies thoughts of self-harm, however, sometimes she feels as if she didn't wake up it would be ok. Denies intent, plan. Remembering where her life was before and where it is now has been difficult. She is seeing a therapist as well as psychiatrist. Next psych appt. Next moth.     -had a mixed drink with alcohol a couple of days ago, only had 1 drink. No other etoh use.  Denies marijuana, cocaine use.     Pain: pain is at baseline. Pain in legs is persistent and waking her up at night . Notes that doing housework flares her pain     Patient Active Problem List   Diagnosis     Obesity     Cocaine abuse in remission     Hyperlipidemia LDL goal <130     Glucose intolerance (pre-diabetes)     Iron deficiency anemia     Vitamin D deficiency     Health Care Home (Not Active)      Anxiety state     Major depressive disorder, recurrent episode, moderate (H)     Acne     Family history of colon cancer     Facet arthropathy, lumbar (H)     Stenosis, spinal, lumbar     Alcohol abuse, episodic drinking behavior     Chronic pain     Vitamin D deficiency disease     Depression     History of syphilis     Past Surgical History:   Procedure Laterality Date     COLONOSCOPY  10/29/2013    Procedure: " COLONOSCOPY;  COLONOSCOPY-SCREENING / VITO;  Surgeon: Westley Larios MD;  Location: [facility] OR     COLONOSCOPY  10/29/2013    Procedure: COMBINED COLONOSCOPY, SINGLE BIOPSY/POLYPECTOMY BY BIOPSY;;  Surgeon: Westley Larios MD;  Location: [facility] OR       Social History   Substance Use Topics     Smoking status: Former Smoker     Smokeless tobacco: Never Used      Comment: quit  over 20 yrs ago     Alcohol use Yes     Family History   Problem Relation Age of Onset     Cancer - colorectal Sister      age 38     CANCER No family hx of      DIABETES No family hx of      Hypertension No family hx of      CEREBROVASCULAR DISEASE No family hx of      Thyroid Disease No family hx of      Glaucoma No family hx of      Macular Degeneration No family hx of          Current Outpatient Prescriptions   Medication Sig Dispense Refill     oxyCODONE-acetaminophen (PERCOCET) 5-325 MG per tablet Take 1 tablet by mouth three times daily for back pain. Fill on or after 4/7/17. 45 tablet 0     topiramate (TOPAMAX) 25 MG tablet Take 1 tablet (25 mg) by mouth 2 times daily 60 tablet 1     buPROPion (WELLBUTRIN XL) 150 MG 24 hr tablet Take 1 tablet (150 mg) by mouth every morning 30 tablet 1     aspirin (ASPIR-LOW) 81 MG EC tablet Take 1 tablet (81 mg) by mouth daily 90 tablet 3     gabapentin (NEURONTIN) 600 MG tablet Take 1 tablet (600 mg) by mouth 3 times daily 270 tablet 1     vitamin D (ERGOCALCIFEROL) 20602 UNIT capsule Take 1 capsule (50,000 Units) by mouth every 7 days for 12 doses 12 capsule 0     hydrOXYzine (VISTARIL) 25 MG capsule Take 1 capsule (25 mg) by mouth 3 times daily as needed for anxiety 120 capsule 2     Cholecalciferol (VITAMIN D) 2000 UNITS tablet Take 2,000 Units by mouth daily 100 tablet 3     clindamycin (CLINDAMAX) 1 % gel Apply 1 g topically 2 times daily 60 g 3     DIFFERIN 0.1 % cream Apply  topically At Bedtime. ANGELO.  Will replace Rx for adapalene (DIFFERIN) 0.1 % gel 30 g 5     Ferrous Sulfate  "GRAN by Misc.(Non-Drug; Combo Route) route.       ORDER FOR DME Equipment being ordered: thumb spica wrist brace 1 Device 0     polyethylene glycol (MIRALAX) powder Take 17 g by mouth daily as needed. 30 capful 11     STATIN NOT PRESCRIBED, INTENTIONAL, by Other route continuous prn.  0     ORDER FOR DME one light box for seasonal affective disorder 1 0     Allergies   Allergen Reactions     Vicodin [Hydrocodone-Acetaminophen] Itching       Reviewed and updated as needed this visit by clinical staff  Tobacco  Allergies  Meds  Med Hx  Surg Hx  Fam Hx  Soc Hx      Reviewed and updated as needed this visit by Provider         ROS:  Constitutional, HEENT, cardiovascular, pulmonary, gi and gu systems are negative, except as otherwise noted.    OBJECTIVE:                                                    /86 (BP Location: Right arm, Patient Position: Chair, Cuff Size: Adult Regular)  Pulse 94  Temp 98.1  F (36.7  C) (Oral)  Resp 12  Ht 1.753 m (5' 9\")  Wt 99 kg (218 lb 4.8 oz)  LMP 06/05/2013  SpO2 97%  BMI 32.24 kg/m2  Body mass index is 32.24 kg/(m^2).   GENERAL: healthy, alert and no distress  NECK: no adenopathy, no asymmetry, masses, or scars and thyroid normal to palpation  RESP: lungs clear to auscultation - no rales, rhonchi or wheezes  CV: regular rate and rhythm, normal S1 S2, no S3 or S4, no murmur, click or rub, no peripheral edema and peripheral pulses strong  MS: no gross musculoskeletal defects noted, no edema  PSYCH: mentation appears normal, affect normal/bright    Diagnostic Test Results:  PHQ-9 SCORE 10/24/2016 3/10/2017 4/14/2017   Total Score - - -   Total Score 23 14 15   Some encounter information is confidential and restricted. Go to Review Flowsheets activity to see all data.     GAIL-7 SCORE 10/24/2016 3/10/2017 4/14/2017   Total Score - - -   Total Score 17 18 18          ASSESSMENT/PLAN:                                                      1. Major depressive disorder, " recurrent episode, moderate (H)  Moderate. Pt discharged from hospital 1 month ago and is currently living with a friend. She is following closely with psychiatry and therapist and continues to be in contact with  to look into housing options and disability.  Next psych. appointment scheduled for May. Due to close f/u with psych, f/u in 3 months.     2. Chronic pain syndrome  6. Cocaine abuse in remission  8. Facet arthropathy, lumbar (H)  9. Stenosis, spinal, lumbar  Due to hx of substance abuse, will continue filling Rx with close monitoring and UTox screens prior to refills. However, as sx's are currently stable and at baseline, will transition to monthly screens as opposed to every other week. Emphasized avoiding etoh and other durgs while taking medication. Verbal contracts for safety.   - Ethanol urine  - Drug abuse screen (NL, RW)  - oxyCODONE-acetaminophen (PERCOCET) 5-325 MG per tablet; Take 1 tablet by mouth three times daily for back pain. Fill on or after 4/21/17.  Dispense: 90 tablet; Refill: 0    3. Alcohol abuse, episodic drinking behavior  Recent etoh use and encouraged complete abstinence. May need to d/c percocet if unable to avoid etoh.   - Ethanol urine  - Drug abuse screen (NL, RW)  - multivitamin, therapeutic with minerals (MULTI-VITAMIN) TABS tablet; Take 1 tablet by mouth daily  Dispense: 100 tablet; Refill: 3    4. History of syphilis  F/u with KAREN after last visit to review titer results and labs are expected for her hx of syphilis. Will follow out titer to ensure stable and not climbing which would indicate new exposure  - RPR with titer to monitor    5. Vit d def- on ergocalciferol. Finish 12 week course then labs planned    See Patient Instructions  Patient Instructions   Continue with high dose VitaminD and finish course.     Start taking daily multivitamin    Follow up in 3 months for check-in.     This document serves as a record of the services and decisions personally  performed and made by Vee Marlow MD. It was created on her behalf by Malia Valderrama,a trained medical scribe. The creation of this document is based the provider's statements to the medical scribe.  Malia Valderrama April 14, 2017 9:44 AM     The information in this document, created by a scribe for me, accurately reflects the services I personally performed and the decisions made by me. I have reviewed and approved this document for accuracy.    MD Vee Borrego MD  [facility]

## 2017-04-14 NOTE — NURSING NOTE
"Chief Complaint   Patient presents with     Recheck Medication       Initial /90 (BP Location: Right arm, Patient Position: Chair, Cuff Size: Adult Regular)  Pulse 94  Temp 98.1  F (36.7  C) (Oral)  Resp 12  Ht 1.753 m (5' 9\")  Wt 99 kg (218 lb 4.8 oz)  LMP 06/05/2013  SpO2 97%  BMI 32.24 kg/m2 Estimated body mass index is 32.24 kg/(m^2) as calculated from the following:    Height as of this encounter: 1.753 m (5' 9\").    Weight as of this encounter: 99 kg (218 lb 4.8 oz).  Medication Reconciliation: tegan Albarran        "

## 2017-04-14 NOTE — MR AVS SNAPSHOT
After Visit Summary   4/14/2017    Jina Reeves    MRN: [id number]           Patient Information     Date Of Birth          1960        Visit Information        Provider Department      4/14/2017 9:20 AM Vee Marlow MD [facility]        Today's Diagnoses     Major depressive disorder, recurrent episode, moderate (H)    -  1    Chronic pain syndrome        Alcohol abuse, episodic drinking behavior        Screen for colon cancer        Visit for screening mammogram        Cocaine abuse in remission        History of syphilis        Facet arthropathy, lumbar (H)        Stenosis, spinal, lumbar          Care Instructions    Continue with high dose VitaminD and finish course.     Start taking daily multivitamin    Follow up in 3 months for check-in.         Follow-ups after your visit        Your next 10 appointments already scheduled     May 05, 2017  1:00 PM CDT   SOCIAL WORK with Jemima Ozuna [facility]   Psychiatry Clinic ([facility])    [address] [facility] [address] [phone]              Who to contact     If you have questions or need follow up information about today's clinic visit or your schedule please contact [facility] directly at [phone].  Normal or non-critical lab and imaging results will be communicated to you by MyChart, letter or phone within 4 business days after the clinic has received the results. If you do not hear from us within 7 days, please contact the clinic through [employer] or phone. If you have a critical or abnormal lab result, we will notify you by phone as soon as possible.  Submit refill requests through [employer] or call your pharmacy and they will forward the refill request to us. Please allow 3 business days for your refill to be completed.          Additional Information About Your Visit        MyChart Information     [employer] gives you secure  "access to your electronic health record. If you see a primary care provider, you can also send messages to your care team and make appointments. If you have questions, please call your primary care clinic.  If you do not have a primary care provider, please call [phone] and they will assist you.        Care EveryWhere ID     This is your Care EveryWhere ID. This could be used by other organizations to access your [place] medical records  [id number]        Your Vitals Were     Pulse Temperature Respirations Height Last Period Pulse Oximetry    94 98.1  F (36.7  C) (Oral) 12 1.753 m (5' 9\") 06/05/2013 97%    BMI (Body Mass Index)                   32.24 kg/m2            Blood Pressure from Last 3 Encounters:   04/14/17 110/86   04/06/17 138/86   03/20/17 133/88    Weight from Last 3 Encounters:   04/14/17 99 kg (218 lb 4.8 oz)   04/06/17 127.7 kg (281 lb 9.6 oz)   03/20/17 99 kg (218 lb 3.2 oz)              We Performed the Following     Drug abuse screen (NL, RW)     Ethanol urine     RPR with titer to monitor          Today's Medication Changes          These changes are accurate as of: 4/14/17  9:40 AM.  If you have any questions, ask your nurse or doctor.               Start taking these medicines.        Dose/Directions    multivitamin, therapeutic with minerals Tabs tablet   Used for:  Alcohol abuse, episodic drinking behavior   Started by:  Vee Marlow MD        Dose:  1 tablet   Take 1 tablet by mouth daily   Quantity:  100 tablet   Refills:  3         These medicines have changed or have updated prescriptions.        Dose/Directions    oxyCODONE-acetaminophen 5-325 MG per tablet   Commonly known as:  PERCOCET   This may have changed:  additional instructions   Used for:  Facet arthropathy, lumbar (H), Stenosis, spinal, lumbar, Chronic pain syndrome   Changed by:  Vee Marlow MD        Take 1 tablet by mouth three times daily for back pain. Fill on or after 4/21/17. "   Quantity:  90 tablet   Refills:  0            Where to get your medicines      These medications were sent to [facility] - [place], [place] - [address]  [address] [address]     Phone:  [phone]     multivitamin, therapeutic with minerals Tabs tablet         Some of these will need a paper prescription and others can be bought over the counter.  Ask your nurse if you have questions.     Bring a paper prescription for each of these medications     oxyCODONE-acetaminophen 5-325 MG per tablet                Primary Care Provider Office Phone # Fax #    Vee Marlow [phone]       [facility] [address] [place] [address] [address]  [facility] [address]        Thank you!     Thank you for choosing [facility]  for your care. Our goal is always to provide you with excellent care. Hearing back from our patients is one way we can continue to improve our services. Please take a few minutes to complete the written survey that you may receive in the mail after your visit with us. Thank you!             Your Updated Medication List - Protect others around you: Learn how to safely use, store and throw away your medicines at www.disposemymeds.org.          This list is accurate as of: 4/14/17  9:40 AM.  Always use your most recent med list.                   Brand Name Dispense Instructions for use    aspirin 81 MG EC tablet    ASPIR-LOW    90 tablet    Take 1 tablet (81 mg) by mouth daily       buPROPion 150 MG 24 hr tablet    WELLBUTRIN XL    30 tablet    Take 1 tablet (150 mg) by mouth every morning       clindamycin 1 % topical gel    CLINDAMAX    60 g    Apply 1 g topically 2 times daily       DIFFERIN 0.1 % cream   Generic drug:  adapalene     30 g    Apply  topically At Bedtime. ANGELO. Will replace Rx for adapalene (DIFFERIN) 0.1 % gel       Ferrous Sulfate Gran      by Misc.(Non-Drug; Combo Route) route.       gabapentin 600 MG  tablet    NEURONTIN    270 tablet    Take 1 tablet (600 mg) by mouth 3 times daily       hydrOXYzine 25 MG capsule    VISTARIL    120 capsule    Take 1 capsule (25 mg) by mouth 3 times daily as needed for anxiety       multivitamin, therapeutic with minerals Tabs tablet     100 tablet    Take 1 tablet by mouth daily       * order for DME     1    one light box for seasonal affective disorder       * order for DME     1 Device    Equipment being ordered: thumb spica wrist brace       oxyCODONE-acetaminophen 5-325 MG per tablet    PERCOCET    90 tablet    Take 1 tablet by mouth three times daily for back pain. Fill on or after 4/21/17.       polyethylene glycol powder    MIRALAX    30 capful    Take 17 g by mouth daily as needed.       * STATIN NOT PRESCRIBED (INTENTIONAL)      by Other route continuous prn.       topiramate 25 MG tablet    TOPAMAX    60 tablet    Take 1 tablet (25 mg) by mouth 2 times daily       vitamin D 2000 UNITS tablet     100 tablet    Take 2,000 Units by mouth daily       vitamin D 11994 UNIT capsule    ERGOCALCIFEROL    12 capsule    Take 1 capsule (50,000 Units) by mouth every 7 days for 12 doses       * Notice:  This list has 3 medication(s) that are the same as other medications prescribed for you. Read the directions carefully, and ask your doctor or other care provider to review them with you.

## 2017-04-15 ASSESSMENT — PATIENT HEALTH QUESTIONNAIRE - PHQ9: SUM OF ALL RESPONSES TO PHQ QUESTIONS 1-9: 15

## 2017-04-15 ASSESSMENT — ANXIETY QUESTIONNAIRES: GAD7 TOTAL SCORE: 18

## 2017-04-17 LAB
AMPHETAMINES UR QL: ABNORMAL NG/ML
BARBITURATES UR QL SCN: ABNORMAL NG/ML
BENZODIAZ UR QL SCN: ABNORMAL NG/ML
BUPRENORPHINE UR QL: ABNORMAL NG/ML
CANNABINOIDS UR QL: ABNORMAL NG/ML
COCAINE UR QL SCN: ABNORMAL NG/ML
D-METHAMPHET UR QL: ABNORMAL NG/ML
METHADONE UR QL SCN: ABNORMAL NG/ML
OPIATES UR QL SCN: ABNORMAL NG/ML
OXYCODONE UR QL SCN: ABNORMAL NG/ML
PCP UR QL SCN: ABNORMAL NG/ML
PROPOXYPH UR QL: ABNORMAL NG/ML
RPR SER-TITR: 1 {TITER}
TRICYCLICS UR QL SCN: ABNORMAL NG/ML

## 2017-04-24 ENCOUNTER — CARE COORDINATION (OUTPATIENT)
Dept: CARE COORDINATION | Facility: CLINIC | Age: 57
End: 2017-04-24

## 2017-04-24 RX ORDER — OXYCODONE AND ACETAMINOPHEN 5; 325 MG/1; MG/1
1 TABLET ORAL EVERY 8 HOURS PRN
Qty: 45 TABLET | Refills: 0 | Status: SHIPPED | OUTPATIENT
Start: 2017-04-24 | End: 2017-05-10

## 2017-04-24 NOTE — TELEPHONE ENCOUNTER
Please advise.  Appears Rx wouldve been given to pt at 4/14/17.  Pt states she lost it.    Jessica MEADOWS, Patient Care

## 2017-04-24 NOTE — TELEPHONE ENCOUNTER
Reason for Call:  prescription for Oxycodone    Detailed comments: Patient called to request a written prescription for Oxycodone be mailed to [facility] [address].  She lost the original script.    Please call to advise.    Phone Number Patient can be reached at: Home number on file [phone] (home)    Best Time: anytime    Can we leave a detailed message on this number? YES     Thank you,    Call taken on 4/24/2017 at 8:21 AM by Nancy Oneill

## 2017-04-24 NOTE — TELEPHONE ENCOUNTER
"Checked  database. Does not appear patient has filled \"lost\" rx.   She is homeless currently and and more at risk to loose rx due to this, but also have been monitoring closely due to her etoh use    Please let patient know: Generally do not replace lost rx's, but I am willing to give her 2 week refill at time with continued drug screen with  of the rx.   Please mail her rx.   "

## 2017-04-24 NOTE — PROGRESS NOTES
"2017 Clinic Care Coordination Contact  Social Work     Received a call from pt. stating a friend was to drop her prescription off at [facility] in [place]. When she went to pick it up, there was no record of it being left to be filled.  Friend says he dropped it off.  Pt states she is in pain. Has already left a message for PCP.   Pt is aware I cannot help with this issues.  \"I just wanted to talk to you.\"   Discussed need to take responsibly for prescriptions and   medications, not trusting others.  Pt did states she has been assigned a [facility] , Rachel, [phone]. The person she is staying with did not want the worker to come to the house so Jina needs to call to make an appt ... She spoke of maybe having to leave town for a week for a family .  This writer encouraged pt to contact the county worker as soon as she can.  Stressing this is the person who will be able to help with Disability, housing and authorizing the services she needs.     Plan: Pt will discuss medication issue with PCP and call [facility] worker.   SW will follow up in 7-10 business days.    Keely Sparks [facility]  , Clinic Care Coordination  Clinics:  Karen Patel [place], [place]  [phone]   2017   8:56 AM    "

## 2017-04-27 ENCOUNTER — TELEPHONE (OUTPATIENT)
Dept: FAMILY MEDICINE | Facility: CLINIC | Age: 57
End: 2017-04-27

## 2017-04-27 DIAGNOSIS — G89.4 CHRONIC PAIN SYNDROME: ICD-10-CM

## 2017-04-27 DIAGNOSIS — M47.816 FACET ARTHROPATHY, LUMBAR: Primary | ICD-10-CM

## 2017-04-27 DIAGNOSIS — M48.061 STENOSIS, SPINAL, LUMBAR: ICD-10-CM

## 2017-04-27 NOTE — TELEPHONE ENCOUNTER
Pt updated on the below. She will check with the pharmacy if they got it tomorrow. Will call the clinic tomorrow.  Info on referral given  Justina Sheffield RN

## 2017-04-27 NOTE — TELEPHONE ENCOUNTER
Pt stating that pharmacy has not received her percocet, had to be mailed. Pt said. No records found or Rx mailed.  She has been without her meds for couple of days, rating pain 8/10.    Additionally requesting referral to spine specialist to get a shot.    Please review and advise when able.  Justina Sheffield RN

## 2017-04-27 NOTE — TELEPHONE ENCOUNTER
SHe telephone note from 4/24/17- can view in results section (not sure why it's not showing up in the encounters?). Rx was mailed. She had lost her original rx. Rx that was mailed will likely get to the pharmacy in next 1-2 days. If she wants to  rx on Friday from our office we can cancel that rx that was sent to pharmacy.   Referral placed to ortho [facility] for spine specialist

## 2017-04-27 NOTE — TELEPHONE ENCOUNTER
Reason for Call:  Other call back    Detailed comments: patient states she's in sever pain in her back and is requesting a shot. Patient states she's not sure what's going on with her medication but she's been without it for four to five days now. Patient would like a call back from nurse right away. Thanks.    Phone Number Patient can be reached at: Cell number on file:    Telephone Information:   Mobile [phone]       Best Time: Anytime     Can we leave a detailed message on this number? YES    Call taken on 4/27/2017 at 3:53 PM by Alma Cavazos

## 2017-05-05 ENCOUNTER — ALLIED HEALTH/NURSE VISIT (OUTPATIENT)
Dept: PSYCHIATRY | Facility: CLINIC | Age: 57
End: 2017-05-05
Attending: PSYCHIATRY & NEUROLOGY
Payer: COMMERCIAL

## 2017-05-05 DIAGNOSIS — Z71.89 COUNSELING AND COORDINATION OF CARE: Primary | ICD-10-CM

## 2017-05-05 NOTE — Clinical Note
Hannah - please review and sign off.   Sheyla - I'm supposed to meet with Jina today after her appt with you, but told her I'd leave it up to her and how she feels. She became pretty overwhelmed and frustrated when we met and I didn't want to risk her becoming upset again and interfere with your treatment together.   Olive - NIK only.   Let me know if any questions!  Thanks!  Jemima

## 2017-05-05 NOTE — MR AVS SNAPSHOT
After Visit Summary   5/5/2017    Jina Reeves    MRN: [id number]           Patient Information     Date Of Birth          1960        Visit Information        Provider Department      5/5/2017 1:00 PM Jemima Ozuna LGSW Psychiatry Clinic        Today's Diagnoses     Counseling and coordination of care    -  1       Follow-ups after your visit        Your next 10 appointments already scheduled     May 08, 2017  2:00 PM CDT   Adult Psychotherapy with Sheyla Hawkins, PhD   Psychiatry Clinic ([facility])    [address]  [address] [facility] [address] [phone]            May 10, 2017  2:40 PM CDT   MEDSPINE NEW with Brii Busch MD   [facility] ([facility])    [address]  [address]  [facility] [address] [phone]              Who to contact     Please call your clinic at [phone] to:    Ask questions about your health    Make or cancel appointments    Discuss your medicines    Learn about your test results    Speak to your doctor   If you have compliments or concerns about an experience at your clinic, or if you wish to file a complaint, please contact [facility] Patient Relations at [phone] or email us at [email or web address]         Additional Information About Your Visit        MyChart Information     [employer] gives you secure access to your electronic health record. If you see a primary care provider, you can also send messages to your care team and make appointments. If you have questions, please call your primary care clinic.  If you do not have a primary care provider, please call [phone] and they will assist you.      [employer] is an electronic gateway that provides easy, online access to your medical records. With [employer], you can request a clinic appointment, read your test results, renew a prescription or communicate with your  care team.     To access your existing account, please contact your [facility] or call [phone] for assistance.        Care EveryWhere ID     This is your Care EveryWhere ID. This could be used by other organizations to access your [place] medical records  [id number]        Your Vitals Were     Last Period                   06/05/2013            Blood Pressure from Last 3 Encounters:   04/14/17 110/86   04/06/17 138/86   03/20/17 133/88    Weight from Last 3 Encounters:   04/14/17 99 kg (218 lb 4.8 oz)   04/06/17 127.7 kg (281 lb 9.6 oz)   03/20/17 99 kg (218 lb 3.2 oz)              Today, you had the following     No orders found for display       Primary Care Provider Office Phone # Fax #    Vee Marlow [phone]       [facility] [address] [place] [address] [address]  [facility] [address]        Thank you!     Thank you for choosing PSYCHIATRY CLINIC  for your care. Our goal is always to provide you with excellent care. Hearing back from our patients is one way we can continue to improve our services. Please take a few minutes to complete the written survey that you may receive in the mail after your visit with us. Thank you!             Your Updated Medication List - Protect others around you: Learn how to safely use, store and throw away your medicines at www.disposemymeds.org.          This list is accurate as of: 5/5/17 11:59 PM.  Always use your most recent med list.                   Brand Name Dispense Instructions for use    aspirin 81 MG EC tablet    ASPIR-LOW    90 tablet    Take 1 tablet (81 mg) by mouth daily       buPROPion 150 MG 24 hr tablet    WELLBUTRIN XL    30 tablet    Take 1 tablet (150 mg) by mouth every morning       clindamycin 1 % topical gel    CLINDAMAX    60 g    Apply 1 g topically 2 times daily       DIFFERIN 0.1 % cream   Generic drug:  adapalene     30 g    Apply  topically At Bedtime. ANGELO. Will replace Rx for adapalene  (DIFFERIN) 0.1 % gel       Ferrous Sulfate Gran      by Misc.(Non-Drug; Combo Route) route.       gabapentin 600 MG tablet    NEURONTIN    270 tablet    Take 1 tablet (600 mg) by mouth 3 times daily       hydrOXYzine 25 MG capsule    VISTARIL    120 capsule    Take 1 capsule (25 mg) by mouth 3 times daily as needed for anxiety       multivitamin, therapeutic with minerals Tabs tablet     100 tablet    Take 1 tablet by mouth daily       * order for DME     1    one light box for seasonal affective disorder       * order for DME     1 Device    Equipment being ordered: thumb spica wrist brace       * oxyCODONE-acetaminophen 5-325 MG per tablet    PERCOCET    90 tablet    Take 1 tablet by mouth three times daily for back pain. Fill on or after 4/21/17.       * oxyCODONE-acetaminophen 5-325 MG per tablet    PERCOCET    45 tablet    Take 1 tablet by mouth every 8 hours as needed for pain Next fill due 5/8/2017 or later       polyethylene glycol powder    MIRALAX    30 capful    Take 17 g by mouth daily as needed.       * STATIN NOT PRESCRIBED (INTENTIONAL)      by Other route continuous prn.       topiramate 25 MG tablet    TOPAMAX    60 tablet    Take 1 tablet (25 mg) by mouth 2 times daily       vitamin D 2000 UNITS tablet     100 tablet    Take 2,000 Units by mouth daily       vitamin D 10447 UNIT capsule    ERGOCALCIFEROL    12 capsule    Take 1 capsule (50,000 Units) by mouth every 7 days for 12 doses       * Notice:  This list has 5 medication(s) that are the same as other medications prescribed for you. Read the directions carefully, and ask your doctor or other care provider to review them with you.

## 2017-05-08 ENCOUNTER — OFFICE VISIT (OUTPATIENT)
Dept: PSYCHIATRY | Facility: CLINIC | Age: 57
End: 2017-05-08
Attending: PSYCHOLOGIST
Payer: COMMERCIAL

## 2017-05-08 DIAGNOSIS — F33.1 MAJOR DEPRESSIVE DISORDER, RECURRENT EPISODE, MODERATE (H): Primary | ICD-10-CM

## 2017-05-08 DIAGNOSIS — F43.10 PTSD (POST-TRAUMATIC STRESS DISORDER): ICD-10-CM

## 2017-05-08 NOTE — MR AVS SNAPSHOT
After Visit Summary   5/8/2017    Jina Reeves    MRN: [id number]           Patient Information     Date Of Birth          1960        Visit Information        Provider Department      5/8/2017 2:00 PM Sheyla Hawkins, PhD Psychiatry Clinic        Today's Diagnoses     Major depressive disorder, recurrent episode, moderate (H)    -  1    PTSD (post-traumatic stress disorder)           Follow-ups after your visit        Your next 10 appointments already scheduled     May 31, 2017 10:30 AM CDT   Adult Med Follow UP with HALIE Ray CNP   Psychiatry Clinic ([facility])    [address]  [address] [facility] [id number] [phone]              Who to contact     Please call your clinic at [phone] to:    Ask questions about your health    Make or cancel appointments    Discuss your medicines    Learn about your test results    Speak to your doctor   If you have compliments or concerns about an experience at your clinic, or if you wish to file a complaint, please contact [facility] Patient Relations at [phone] or email us at [email or web address]         Additional Information About Your Visit        MyChart Information     "[employer]t gives you secure access to your electronic health record. If you see a primary care provider, you can also send messages to your care team and make appointments. If you have questions, please call your primary care clinic.  If you do not have a primary care provider, please call [phone] and they will assist you.      [employer] is an electronic gateway that provides easy, online access to your medical records. With [employer], you can request a clinic appointment, read your test results, renew a prescription or communicate with your care team.     To access your existing account, please contact your [facility] or call  [phone] for assistance.        Care EveryWhere ID     This is your Care EveryWhere ID. This could be used by other organizations to access your [place] medical records  [id number]        Your Vitals Were     Last Period                   06/05/2013            Blood Pressure from Last 3 Encounters:   05/09/17 127/81   04/14/17 110/86   04/06/17 138/86    Weight from Last 3 Encounters:   05/09/17 98.2 kg (216 lb 6.4 oz)   04/14/17 99 kg (218 lb 4.8 oz)   04/06/17 127.7 kg (281 lb 9.6 oz)              Today, you had the following     No orders found for display       Primary Care Provider Office Phone # Fax #    Vee Marlow [phone]       [facility] [address] [place] [address] [address]  [facility] [address]        Thank you!     Thank you for choosing PSYCHIATRY CLINIC  for your care. Our goal is always to provide you with excellent care. Hearing back from our patients is one way we can continue to improve our services. Please take a few minutes to complete the written survey that you may receive in the mail after your visit with us. Thank you!             Your Updated Medication List - Protect others around you: Learn how to safely use, store and throw away your medicines at www.disposemymeds.org.          This list is accurate as of: 5/8/17 11:59 PM.  Always use your most recent med list.                   Brand Name Dispense Instructions for use    aspirin 81 MG EC tablet    ASPIR-LOW    90 tablet    Take 1 tablet (81 mg) by mouth daily       buPROPion 150 MG 24 hr tablet    WELLBUTRIN XL    30 tablet    Take 1 tablet (150 mg) by mouth every morning       clindamycin 1 % topical gel    CLINDAMAX    60 g    Apply 1 g topically 2 times daily       DIFFERIN 0.1 % cream   Generic drug:  adapalene     30 g    Apply  topically At Bedtime. ANGELO. Will replace Rx for adapalene (DIFFERIN) 0.1 % gel       Ferrous Sulfate Gran      by Misc.(Non-Drug; Combo Route) route.       gabapentin 600 MG  tablet    NEURONTIN    270 tablet    Take 1 tablet (600 mg) by mouth 3 times daily       hydrOXYzine 25 MG capsule    VISTARIL    120 capsule    Take 1 capsule (25 mg) by mouth 3 times daily as needed for anxiety       multivitamin, therapeutic with minerals Tabs tablet     100 tablet    Take 1 tablet by mouth daily       * order for DME     1    one light box for seasonal affective disorder       * order for DME     1 Device    Equipment being ordered: thumb spica wrist brace       polyethylene glycol powder    MIRALAX    30 capful    Take 17 g by mouth daily as needed.       * STATIN NOT PRESCRIBED (INTENTIONAL)      by Other route continuous prn.       topiramate 25 MG tablet    TOPAMAX    60 tablet    Take 1 tablet (25 mg) by mouth 2 times daily       vitamin D 2000 UNITS tablet     100 tablet    Take 2,000 Units by mouth daily       vitamin D 62062 UNIT capsule    ERGOCALCIFEROL    12 capsule    Take 1 capsule (50,000 Units) by mouth every 7 days for 12 doses       * Notice:  This list has 3 medication(s) that are the same as other medications prescribed for you. Read the directions carefully, and ask your doctor or other care provider to review them with you.

## 2017-05-08 NOTE — PROGRESS NOTES
Social Work Visit (Brief)  [facility]    Data/Intervention:  Patient Name:  Jina Poplister  /Age:  1960 (56 year old)    Reason for Visit:  Review community resources and progress on SSI Disability application    Collaborated With:    -Jina/Pt  -Rachel PARRY with [employer]    Writer met with Jina individually in clinic to review progress on application for SSI Disabllity benefits and to assess current immediate needs.     Jina stated she met with her  earlier today for the first time. Jina stated the CM was going to mail Jina some paperwork for her disability benefits, but Jina could not recall specifics. JESIKA suggested calling CM which Jina was receptive to. SW also prompted Jina to sign an authorization to communicate with CM and this document has been sent for scanning into her record. Jina and writer called her CM, Rachel [phone] with [employer] to request clarification about their meeting this morning. Rachel stated she is requesting a SMRT assessment so Jina can be certified disabled and may be eligible to receive county benefits or waivered services prior to SSI which can take several months. Rachel stated she will be available to assist Jina with completing the application. Jina stated she understood and would follow up with her  after she receives the paperwork in the mail.     JESIKA prompted Jina to identify other immediate needs at this time. Jina reported she continues to reside at her friend's home and that she feels safe there, though this is not an ideal living arrangement for her. Jina denied having adequate access to meet her nutritional needs. Jina reported she has visited [facility] in [place] in the past, but is only able to get items once per month and that they have only a limited selection of food items. Writer suggested other food shelves and offered to look into other resources Jina may use. Jina stated she  "did not believe she would be able to visit other food shelves because she is not a [place] resident. Writer suggested calling [employer] together, which she agreed to. [employer] stated that if Jina is able to provide an id and piece of mail with her address, she would be eligible for their services. Writer assisted Jina by providing directions to [employer] and their hours of operation so that she could visit and obtain some food items.     Writer prompted Jina to identify if she was open to collaborating to write a letter describing her situation and ask for her SSI application to be expedited so that she may start receiving benefits. Jina stated, \"I thought you were going to do that.\" Writer reviewed that SW can provide assistance, but that Jina must provide the content. Jina declined to write letter at this time and became tearful stating she was feeling \"overwhlemed.\" Writer validated Jina and suggested identifying a small list of tasks to prioritize to help her be organized and break down into more manageable chunks. Jina declined stating she was \"not in the mood\" to do anything else with writer today. Writer reviewed availability if she did decide she wanted to work on other tasks to promote her psychosocial needs. Jina stated she has a therapy appt on 5/8/17 and scheduled appt with SW following therapy with Sheyla Hawkins in clinic.     Resources Provided:  -[employer], food shelf contact information and hours of operation  -Informational handout about applying for expedited review of SSI application    Plan:  Provided patient/family with writer's contact information and availability.   Will meet with Jina after her therapy appt on 5/8/17 at 3:00 PM in clinic.     Will route to patient's psychiatric provider(s) as an FYI.   Please call or page if needs or concerns arise.     This is a non-billable encounter as it was solely for the purposes of outreach and/or care coordination.   "

## 2017-05-09 ENCOUNTER — OFFICE VISIT (OUTPATIENT)
Dept: PSYCHIATRY | Facility: CLINIC | Age: 57
End: 2017-05-09
Attending: NURSE PRACTITIONER
Payer: COMMERCIAL

## 2017-05-09 ENCOUNTER — CARE COORDINATION (OUTPATIENT)
Dept: CARE COORDINATION | Facility: CLINIC | Age: 57
End: 2017-05-09

## 2017-05-09 VITALS
DIASTOLIC BLOOD PRESSURE: 81 MMHG | HEART RATE: 81 BPM | SYSTOLIC BLOOD PRESSURE: 127 MMHG | WEIGHT: 216.4 LBS | BODY MASS INDEX: 31.96 KG/M2

## 2017-05-09 DIAGNOSIS — F43.10 PTSD (POST-TRAUMATIC STRESS DISORDER): Primary | ICD-10-CM

## 2017-05-09 PROCEDURE — 99212 OFFICE O/P EST SF 10 MIN: CPT | Mod: ZF

## 2017-05-09 NOTE — PROGRESS NOTES
"5/9/2017 Clinic Care Coordination Contact  Care Team Conversations    Spoke with pt twice today. She was frustrated by a change in her  Insurance.  She apparently did not get her mail and did not complete the \"choice\" for insurance,. She was assigned [employer]. She had wanted [employer].  Informed pt she can call [facility] [place] [phone] to request a change. Fransisco is not up to making the call today. Will call me tomorrow. Pt stated she did meet with [facility] [phone], on May 5th.  She spoke of starting the SMRT process.  Also encouraged pt to darryl [employer] to be assessed for the $203 income. She was denied in the past, but they might waive the issue disqualifying her. Pt stated she is to meet with her [facility] provider provider Olive Starr later today. She was very vague about  A new provider in [place] as she was told she had trauma.    Chart review shows pt met with [facility] JESIKA- Jemima Ozuna [facility] [phone]  on 5/05/17.  It appears we are both working on the same things with pt. She assisted pt in making a conference call to  Rachel (?  Case Manger) to verify the SMRT process has been started.  This writer spoke with Jemima Ozuna.  We have been duplicating some efforts mostly because Jina is vague and calls when upset.     Plan: Pt will call this writer tomorrow when she feels up to making some phone calls. Will assist pt in calling Rachel to clarify what her role will be.     Keely Sparks [facility]  , Clinic Care Coordination  Clinics:  Karen Patel [place], [place]  [phone]   5/9/2017   2:56 PM    "

## 2017-05-09 NOTE — NURSING NOTE
Chief Complaint   Patient presents with     Recheck Medication     MDD    Reviewed allergies, smoking status, and pharmacy preference  Obtained weight, blood pressure and heart rate

## 2017-05-09 NOTE — PROGRESS NOTES
"  Outpatient Psychiatry Progress Note     Provider: HALIE Nunez CNP  Date: 2017  Service:  Medication management.   Patient Identification: Jina Reeves  : 1960   MRN: [id number]    Jina Reeves is a 56 year old year old female who presents for ongoing psychiatric care.  Jina Reeves was last seen in clinic on 17.   At that time, she chose to continue Wellbutrin XL 150mg qAM, topamax 25mg BID, hydroxyzine 25mg BID PRN. She denies side effects of medication.    2017  Today Jina reports she is not doing well. Her therapist, Sheyla, referred her for a therapist that specializes in PTSD; she made an appt for 17. She briefly discussed childhood trauma. Tearful discussing how peers and relationships in the community have let her down when she's counted on others.    She lives with her friend without paying her but she is driving her friend places. She feels safe with her friend.    Awaiting new appt with Rachel PARRY through [employer] who helps client with SMRT assessment to determine her eligibility for [employer] while waiting for SSDI application. Jemima connected her with resources for food at [facility] and another food shelf that may have requirements based on address.    Psychiatric Review of Systems:  She describes her depression as \"about the same, its about my life\". She gets upset when discussing her trauma history. Irritated easily; pleasant, smiling appropriately, tearful discussing recent stressors.     Drinks grapefruit juice on occasion; discussed potential enzyme interactions.    Review of Medical Systems:  Appetite: low, limited food resources but states \"I don't worry about that anymore, I eat when I can after I gained all that weight eating junk food\"  Sleep: poor, wakes to leg and back pain overnight sleeping on a loveseat  Self Care: encouraged, enjoys getting out with her friend when she drives her around, had a BBQ on  with her friend; concerned " "for her friend's sobriety  Housework and hygiene: lives at a friend's home; appropriately dressed, disheveled grooming  Energy: adequate  Concentration: \"I can't concentrate, I keep thinking about the childhood stuff, dreaming about it, hearing voices of my  family\"    Current Substance Use:  Social History     Social History     Marital status:      Spouse name: N/A     Number of children: N/A     Years of education: N/A     Social History Main Topics     Smoking status: Former Smoker     Smokeless tobacco: Never Used      Comment: quit  over 20 yrs ago     Alcohol use Yes     Drug use: No     Sexual activity: Yes     Partners: Male     Other Topics Concern      Service No     Blood Transfusions No     Caffeine Concern No     Occupational Exposure No     Hobby Hazards No     Sleep Concern No     Stress Concern No     Weight Concern Yes     Special Diet No     Back Care No     Exercise Yes     Bike Helmet No     Seat Belt Yes     Self-Exams No     Social History Narrative     Nicotine: denies  Caffeine: denies coffee recently, drinks water, juice and one can pop 2-3x weekly     Motivated to continue sobriety and reports she is staying sober with alcohol; sober from cannabis and cocaine over the last 3-4 years.  She continues to decline drugs, alcohol offered to her. Negative UDS on 3/24/17.    Past Medical History:   Past Medical History:   Diagnosis Date     Anemia      Cocaine abuse, in remission     ten years since used     Glucose intolerance (pre-diabetes)     a1c6.1 prediabetes     NONSPECIFIC MEDICAL HISTORY     nsvdx4     NONSPECIFIC MEDICAL HISTORY     IUD in past removed for infection     Obesity, unspecified      Phthirus pubis (pubic louse)     20 yrs ago     Seasonal affective disorder (H)      Medical health update: recently saw PCP, no new diagnosis or medication changes    Allergies:   Allergies   Allergen Reactions     Vicodin [Hydrocodone-Acetaminophen] Itching        Current " Medications     Current Outpatient Prescriptions Ordered in [facility]   Medication Sig Dispense Refill     oxyCODONE-acetaminophen (PERCOCET) 5-325 MG per tablet Take 1 tablet by mouth every 8 hours as needed for pain Next fill due 5/8/2017 or later 45 tablet 0     multivitamin, therapeutic with minerals (MULTI-VITAMIN) TABS tablet Take 1 tablet by mouth daily 100 tablet 3     oxyCODONE-acetaminophen (PERCOCET) 5-325 MG per tablet Take 1 tablet by mouth three times daily for back pain. Fill on or after 4/21/17. 90 tablet 0     topiramate (TOPAMAX) 25 MG tablet Take 1 tablet (25 mg) by mouth 2 times daily 60 tablet 1     buPROPion (WELLBUTRIN XL) 150 MG 24 hr tablet Take 1 tablet (150 mg) by mouth every morning 30 tablet 1     aspirin (ASPIR-LOW) 81 MG EC tablet Take 1 tablet (81 mg) by mouth daily 90 tablet 3     gabapentin (NEURONTIN) 600 MG tablet Take 1 tablet (600 mg) by mouth 3 times daily 270 tablet 1     vitamin D (ERGOCALCIFEROL) 04649 UNIT capsule Take 1 capsule (50,000 Units) by mouth every 7 days for 12 doses 12 capsule 0     hydrOXYzine (VISTARIL) 25 MG capsule Take 1 capsule (25 mg) by mouth 3 times daily as needed for anxiety 120 capsule 2     Cholecalciferol (VITAMIN D) 2000 UNITS tablet Take 2,000 Units by mouth daily 100 tablet 3     clindamycin (CLINDAMAX) 1 % gel Apply 1 g topically 2 times daily 60 g 3     DIFFERIN 0.1 % cream Apply  topically At Bedtime. ANGELO.  Will replace Rx for adapalene (DIFFERIN) 0.1 % gel 30 g 5     Ferrous Sulfate GRAN by Misc.(Non-Drug; Combo Route) route.       ORDER FOR DME Equipment being ordered: thumb spica wrist brace 1 Device 0     polyethylene glycol (MIRALAX) powder Take 17 g by mouth daily as needed. 30 capful 11     STATIN NOT PRESCRIBED, INTENTIONAL, by Other route continuous prn.  0     ORDER FOR DME one light box for seasonal affective disorder 1 0     No current [facility]-ordered facility-administered medications on file.       Mental Status Exam     Appearance:   "Casually dressed and Disheveled  Behavior/relationship to examiner/demeanor:  Cooperative, Engaged and Pleasant  Orientation: Oriented to person, place, time and situation  Psychomotor: WNL  Speech Rate:  Normal  Speech Spontaneity:  Normal  Mood:  \"okay\"  Affect:  Appropriate/mood-congruent  Thought Process (Associations):  Goal directed  Thought Content:  no evidence of suicidal or homicidal ideation, denies suicidal ideation, intent or thoughts and patient denies auditory and visual hallucinations  Abnormal Perception:  None  Attention/Concentration:  Normal  Language:  Intact  Insight:  Limited  Judgment:  Fair      Results     Vital signs: /81  Pulse 81  Wt 98.2 kg (216 lb 6.4 oz)  LMP 06/05/2013  BMI 31.96 kg/m2    Laboratory Data:   Lab Results   Component Value Date    WBC 4.8 03/03/2017    HGB 12.4 03/03/2017    HCT 38.3 03/03/2017     03/03/2017    CHOL 176 03/03/2017    TRIG 125 03/03/2017    HDL 56 03/03/2017    ALT 20 03/03/2017    AST 20 03/03/2017     03/06/2017    BUN 11 03/06/2017    CO2 25 03/06/2017    TSH 0.92 03/03/2017         Assessment & Plan      Jina Reeves is seen today for medication management.    Diagnosis  Axis 1: PTSD; alcohol abuse in recent remission; cocaine and cannabis in remote remission  Axis 2: deferred    Plan:  She chooses to continue current regimen including Wellbutrin XL 150mg qAM, topamax 25mg BID; hydroxyzine 25mg qHS PRN (has- picked up last RF on Sat). Discussed importance of avoiding grapefruit juice. SSDI form previously submitted to [facility] while Jemima was on vacation. She is scheduled with a new therapist that specializes in PTSD. She sees CM through [employer]. She requests to RTC in 3 weeks, sooner as needed.     She voices understanding of the treatment plan including discussion of options, risks/ benefits. She has clinic contact information and will seek emergency services if urgent or life threatening symptoms present. She " understands risks associated with drug and alcohol use.      Over 50% of this time was spent counseling the patient and/or coordinating care regarding review of social and occupational functioning. In addition patient was counseled on health and wellness practices to augment medication treatment of symptoms. See note for details.    PHQ-9 score:    PHQ-9 SCORE 4/14/2017   Total Score 15     GAD7:   GAIL-7 SCORE 10/24/2016 3/10/2017 4/14/2017   Total Score - - -   Total Score 17 18 18     : 03/2017    Olive Akins, APRN CNP 5/9/2017

## 2017-05-09 NOTE — MR AVS SNAPSHOT
After Visit Summary   5/9/2017    Jina Reeves    MRN: [id number]           Patient Information     Date Of Birth          1960        Visit Information        Provider Department      5/9/2017 3:30 PM Olive Akins APRN [facility]        Today's Diagnoses     PTSD (post-traumatic stress disorder)    -  1       Follow-ups after your visit        Follow-up notes from your care team     Return in about 3 weeks (around 5/30/2017), or if symptoms worsen or fail to improve.      Your next 10 appointments already scheduled     May 10, 2017  2:40 PM CDT   MARKEL BURROWS with Brii Busch MD   [facility] ([facility])    [address]  [facility] [address]  [facility] [address] [phone]              Who to contact     Please call your clinic at [phone] to:    Ask questions about your health    Make or cancel appointments    Discuss your medicines    Learn about your test results    Speak to your doctor   If you have compliments or concerns about an experience at your clinic, or if you wish to file a complaint, please contact [facility] Patient Relations at [phone] or email us at [email or web address]         Additional Information About Your Visit        MyChart Information     [facility] gives you secure access to your electronic health record. If you see a primary care provider, you can also send messages to your care team and make appointments. If you have questions, please call your primary care clinic.  If you do not have a primary care provider, please call [phone] and they will assist you.      [facility] is an electronic gateway that provides easy, online access to your medical records. With [facility], you can request a clinic appointment, read your test results, renew a prescription or communicate with your care team.     To access your existing account, please contact your [facility] or call [phone] for assistance.        Care EveryWhere ID     This is your Care EveryWhere ID. This could be used by other organizations to access your [place] medical records  [id number]        Your Vitals Were     Pulse Last Period BMI (Body Mass Index)             81 06/05/2013 31.96 kg/m2          Blood Pressure from Last 3 Encounters:   05/09/17 127/81   04/14/17 110/86   04/06/17 138/86    Weight from Last 3 Encounters:   05/09/17 98.2 kg (216 lb 6.4 oz)   04/14/17 99 kg (218 lb 4.8 oz)   04/06/17 127.7 kg (281 lb 9.6 oz)              Today, you had the following     No orders found for display       Primary Care Provider Office Phone # Fax #    Vee Marlow [phone]       [facility] [address] [place] [address] [address]  [facility] [address]        Thank you!     Thank you for choosing PSYCHIATRY CLINIC  for your care. Our goal is always to provide you with excellent care. Hearing back from our patients is one way we can continue to improve our services. Please take a few minutes to complete the written survey that you may receive in the mail after your visit with us. Thank you!             Your Updated Medication List - Protect others around you: Learn how to safely use, store and throw away your medicines at www.disposemymeds.org.          This list is accurate as of: 5/9/17  5:47 PM.  Always use your most recent med list.                   Brand Name Dispense Instructions for use    aspirin 81 MG EC tablet    ASPIR-LOW    90 tablet    Take 1 tablet (81 mg) by mouth daily       buPROPion 150 MG 24 hr tablet    WELLBUTRIN XL    30 tablet    Take 1 tablet (150 mg) by mouth every morning       clindamycin 1 % topical gel    CLINDAMAX    60 g    Apply 1 g topically 2 times daily       DIFFERIN 0.1 % cream   Generic drug:  adapalene     30 g    Apply  topically At Bedtime. ANGELO. Will replace Rx for adapalene (DIFFERIN) 0.1 % gel       Ferrous Sulfate Gran      by  Misc.(Non-Drug; Combo Route) route.       gabapentin 600 MG tablet    NEURONTIN    270 tablet    Take 1 tablet (600 mg) by mouth 3 times daily       hydrOXYzine 25 MG capsule    VISTARIL    120 capsule    Take 1 capsule (25 mg) by mouth 3 times daily as needed for anxiety       multivitamin, therapeutic with minerals Tabs tablet     100 tablet    Take 1 tablet by mouth daily       * order for DME     1    one light box for seasonal affective disorder       * order for DME     1 Device    Equipment being ordered: thumb spica wrist brace       * oxyCODONE-acetaminophen 5-325 MG per tablet    PERCOCET    90 tablet    Take 1 tablet by mouth three times daily for back pain. Fill on or after 4/21/17.       * oxyCODONE-acetaminophen 5-325 MG per tablet    PERCOCET    45 tablet    Take 1 tablet by mouth every 8 hours as needed for pain Next fill due 5/8/2017 or later       polyethylene glycol powder    MIRALAX    30 capful    Take 17 g by mouth daily as needed.       * STATIN NOT PRESCRIBED (INTENTIONAL)      by Other route continuous prn.       topiramate 25 MG tablet    TOPAMAX    60 tablet    Take 1 tablet (25 mg) by mouth 2 times daily       vitamin D 2000 UNITS tablet     100 tablet    Take 2,000 Units by mouth daily       vitamin D 26771 UNIT capsule    ERGOCALCIFEROL    12 capsule    Take 1 capsule (50,000 Units) by mouth every 7 days for 12 doses       * Notice:  This list has 5 medication(s) that are the same as other medications prescribed for you. Read the directions carefully, and ask your doctor or other care provider to review them with you.

## 2017-05-10 ENCOUNTER — TELEPHONE (OUTPATIENT)
Dept: FAMILY MEDICINE | Facility: CLINIC | Age: 57
End: 2017-05-10

## 2017-05-10 ENCOUNTER — CARE COORDINATION (OUTPATIENT)
Dept: CARE COORDINATION | Facility: CLINIC | Age: 57
End: 2017-05-10

## 2017-05-10 DIAGNOSIS — M48.061 STENOSIS, SPINAL, LUMBAR: ICD-10-CM

## 2017-05-10 DIAGNOSIS — G89.4 CHRONIC PAIN SYNDROME: ICD-10-CM

## 2017-05-10 DIAGNOSIS — M47.816 FACET ARTHROPATHY, LUMBAR: ICD-10-CM

## 2017-05-10 RX ORDER — OXYCODONE AND ACETAMINOPHEN 5; 325 MG/1; MG/1
1 TABLET ORAL EVERY 8 HOURS PRN
Qty: 90 TABLET | Refills: 0 | Status: SHIPPED | OUTPATIENT
Start: 2017-05-10 | End: 2017-06-05

## 2017-05-10 NOTE — TELEPHONE ENCOUNTER
Reason for Call:  Medication or medication refill:     Do you use a [facility]?  Name of the pharmacy and phone number for the current request:  WRITTEN PRESCRIPTION REQUESTED    Name of the medication requested: Percocet     Other request: Please call and notify patient when ready for . Thanks.     Can we leave a detailed message on this number? YES    Phone number patient can be reached at: Cell number on file:    Telephone Information:   Mobile [phone]       Best Time: ANY TIME     Call taken on 5/10/2017 at 10:35 AM by Alma Cavazos

## 2017-05-10 NOTE — PROGRESS NOTES
5/10/2017 Clinic Care Coordination Contact  Care Team Conversations -Social Work     Spoke with pt. Assisted with a conference call  To Rachel Graves [facility] services, [phone].  Jina gave verbal permission on voice mail for her to talk with me. We need to have clarification of exactly what she is able to help pt with. Message left.   Also assisted pt with a call to [employer] [facility]  [phone] . Pt wants to request a change her insurance from [employer] to   [employer] , so she can continue to see her providers. Long wait on the phone . Pt is choosing to go into the  Service Center in [place] to take care of this.     Plan: Pt will go to [facility] [place] to request a change in  insurance .  SW will  follow up with pt in 7-10 business days.    Keely Sparks [facility]  , Clinic Care Coordination  Clinics:  [place]  [phone]   5/10/2017   12:30 PM
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
## 2017-05-17 ENCOUNTER — CARE COORDINATION (OUTPATIENT)
Dept: CARE COORDINATION | Facility: CLINIC | Age: 57
End: 2017-05-17

## 2017-05-17 ENCOUNTER — TELEPHONE (OUTPATIENT)
Dept: FAMILY MEDICINE | Facility: CLINIC | Age: 57
End: 2017-05-17

## 2017-05-17 DIAGNOSIS — E55.9 VITAMIN D DEFICIENCY DISEASE: ICD-10-CM

## 2017-05-17 RX ORDER — CHOLECALCIFEROL (VITAMIN D3) 50 MCG
1 TABLET ORAL DAILY
Qty: 30 TABLET | Refills: 11 | Status: SHIPPED | OUTPATIENT
Start: 2017-05-17 | End: 2017-08-17

## 2017-05-17 RX ORDER — ERGOCALCIFEROL 1.25 MG/1
CAPSULE, LIQUID FILLED ORAL
Qty: 12 CAPSULE | Refills: 0 | OUTPATIENT
Start: 2017-05-17

## 2017-05-17 NOTE — TELEPHONE ENCOUNTER
vitamin D (ERGOCALCIFEROL) 89802 UNIT capsule      Last Written Prescription Date: 3/10/17  Last Fill Quantity: 12,  # refills: 0   Last Office Visit with [facility], [facility] or [facility] prescribing provider: 4/14/17  Dr. Marlow

## 2017-05-17 NOTE — TELEPHONE ENCOUNTER
Informed pt of message below. Scheduled appt.    Pt's insurance has changed and we are not in network. She will be changing back to [facility] and that will be in effect June 1st    Mami Albarran MA

## 2017-05-17 NOTE — TELEPHONE ENCOUNTER
Patient due for vit D level. Lab only visit.   She can d/c ergocalciferol and now start otc vit D- rx sent in.

## 2017-05-17 NOTE — TELEPHONE ENCOUNTER
Routing refill request to provider for review/approval because:  Drug not on the [facility] refill protocol   Yanci Francis RN

## 2017-05-17 NOTE — PROGRESS NOTES
5/17/2017 Clinic Care Coordination Contact  Care Team Conversations Social Work     Received a message from pt. asking me to call her today after 3:00.  Spoke with pt. She did go to the [facility] to change her insurance to [employer]. This will not happen until June 1st.  She also wanted her MH  phone number. Jina shared she took a job today, but had to stand all day . Due to her  back pain -she will not be able to continue. She cancelled her Pain Clinic appt due to insurance, but plans to make another appt for next month when she has [employer].  Discussed [facility], but she will check with her MH Case Manger to see if that is something she can help with.   Provided the number and tried to call her worker, Rachel [phone] to clarify her role.   Received a return message.   She is working on the SMRT ([employer] Review [facility])process to help determine if she is Disabled. She would then be eligible for other services.  Rachel will also help connect with financial resources, housing , employment, paperwork, connecting with additional MH services if needed. They have only met once but will meet monthly.     Plan: Pt will request another meeting with MH Case Manger. Schedule an appt with the pain clinic in June.  SW will follow up in 1 month.    Keely Sparks [facility]  , Clinic Care Coordination  Clinics:  [place],  [place], [place]  [phone]   5/17/2017   4:15 PM

## 2017-05-25 ENCOUNTER — CARE COORDINATION (OUTPATIENT)
Dept: CARE COORDINATION | Facility: CLINIC | Age: 57
End: 2017-05-25

## 2017-05-25 NOTE — PROGRESS NOTES
5/25/2017 Clinic Care Coordination Contact  Care Team Conversations  Social Work    Received a call from pt.  She wanted to share she is still working at her job. Even though it is very difficult and painful, she is desperately trying to hang in there to make enough $ for a deposit on an apt.   Pt. Had questions about records she requested be sent to her Disability .  Pt. was provided with  phone number to HIM [phone]. She also requested the number for the Pain Clinic ([facility]).   Pt. had to cancel that appt due to he insurance. She will now be covered again as of June 1st with "[employer]. Plans to make another appt.   Pt sounded to be in good sprits today.     Plan: Pt will make appt at Pain Clinic and continue to work with MH providers  SW will follow up in 3 weeks    Keely Sparks [facility]  , Clinic Care Coordination  Clinics:  [place], [place]  [phone]   5/25/2017   3:50 PM

## 2017-05-30 ENCOUNTER — CARE COORDINATION (OUTPATIENT)
Dept: CARE COORDINATION | Facility: CLINIC | Age: 57
End: 2017-05-30

## 2017-05-30 NOTE — PROGRESS NOTES
5/30/2017 Clinic Care Coordination Contact  Care Team Conversations    Received another call from pt. The number I gave her is not correct. Provided pt with number for [facility] [phone] to call regarding release of her records. Frustrated that her appt. with the Trauma therapist was cancelled because she did not confirm the appt. Will call them again today and see it there is a cancellation, or re schedule. No other needs identified at this time.    Plan Pt will follow up on records for Disability and call SW with any needs.   Sw will follow up in 3-4 weeks.     Keely Sparks [facility]  , Clinic Care Coordination  Clinics:  [facility] [place], [place]  [phone]   5/30/2017   10:22 AM

## 2017-05-31 ENCOUNTER — OFFICE VISIT (OUTPATIENT)
Dept: PSYCHIATRY | Facility: CLINIC | Age: 57
End: 2017-05-31
Attending: NURSE PRACTITIONER
Payer: COMMERCIAL

## 2017-05-31 DIAGNOSIS — F33.1 MAJOR DEPRESSIVE DISORDER, RECURRENT EPISODE, MODERATE (H): ICD-10-CM

## 2017-05-31 RX ORDER — BUPROPION HYDROCHLORIDE 150 MG/1
150 TABLET ORAL EVERY MORNING
Qty: 30 TABLET | Refills: 1 | Status: SHIPPED | OUTPATIENT
Start: 2017-05-31 | End: 2017-08-05

## 2017-05-31 RX ORDER — TOPIRAMATE 25 MG/1
25 TABLET, FILM COATED ORAL 2 TIMES DAILY
Qty: 60 TABLET | Refills: 1 | Status: SHIPPED | OUTPATIENT
Start: 2017-05-31 | End: 2017-08-05

## 2017-05-31 NOTE — PROGRESS NOTES
"  Outpatient Psychiatry Progress Note     Provider: HALIE Nunez CNP  Date: 2017  Service:  Medication management.   Patient Identification: Jina Reeves  : 1960   MRN: [id number]    Jina Reeves is a 56 year old year old female who presents for ongoing psychiatric care.  Jina Reeves was last seen in clinic on 2017.  At that time, she chose to continue Wellbutrin XL 150mg qAM, topamax 25mg BID, Atarax 25mg qHS PRN.     Daily med compliant but has taken Wellbutrin and topamax later in the day which doesn't necessarily impact her sleep. Avoids taking Percocet while at work. She denies side effects of medication.    2017  Today Jina reports she is doing \"so so\". Smiling and more talkative. 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 She is motivated to get her own housing and thinks she'll need to work FT at least in the short term for pay a deposit. She'd like to cook more nutritious food in her own kitchen.     She is concerned about her ability to work in this setting for a long time with the physical requirements. May choose to schedule with Dr. Aviles to see if she can get a back brace.    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    She continues to setting appropriate boundaries with her roommate.     Psychiatric Review of Systems:  She reports good mood improvement with current med regimen and therapy. She denies significant irritability.    She denies lethality and SIB.     Review of Medical Systems:  Appetite: eats emotionally which she attributes to stress from work and home with roommate  Sleep: improved after starting work; trazodone previously prescribed is effective but oversedating; sleeping 6 hours overnight  Self Care: encouraged, focused on work to move out independently, enjoys seeing friends and her sister  Housework and hygiene: managing  Energy: intact  Concentration: " intact    Current Substance Use:  Social History     Social History     Marital status:      Spouse name: N/A     Number of children: N/A     Years of education: N/A     Social History Main Topics     Smoking status: Former Smoker     Smokeless tobacco: Never Used      Comment: quit  over 20 yrs ago     Alcohol use Yes     Drug use: No     Sexual activity: Yes     Partners: Male     Other Topics Concern      Service No     Blood Transfusions No     Caffeine Concern No     Occupational Exposure No     Hobby Hazards No     Sleep Concern No     Stress Concern No     Weight Concern Yes     Special Diet No     Back Care No     Exercise Yes     Bike Helmet No     Seat Belt Yes     Self-Exams No     Social History Narrative     Motivated to continue sobriety from alcohol despite being offered; sober from cannabis and cocaine over the last 3-4 years. Negative UDS on 3/24/17. Limits Percocet to what she is prescribed. Denies caffeine, prefers water.    Past Medical History:   Past Medical History:   Diagnosis Date     Anemia      Cocaine abuse, in remission     ten years since used     Glucose intolerance (pre-diabetes)     a1c6.1 prediabetes     NONSPECIFIC MEDICAL HISTORY     [id number]     NONSPECIFIC MEDICAL HISTORY     IUD in past removed for infection     Obesity, unspecified      Phthirus pubis (pubic louse)     20 yrs ago     Seasonal affective disorder (H)      Medical health update: [facility] coverage starts  Care. PCP scheduled lab draw on Monday.    Allergies:   Allergies   Allergen Reactions     Vicodin [Hydrocodone-Acetaminophen] Itching        Current Medications     Current Outpatient Prescriptions Ordered in [facility]   Medication Sig Dispense Refill     Cholecalciferol (VITAMIN D) 2000 UNITS tablet Take 1 tablet by mouth daily 30 tablet 11     oxyCODONE-acetaminophen (PERCOCET) 5-325 MG per tablet Take 1 tablet by mouth every 8 hours as needed for pain 90 tablet 0     multivitamin, therapeutic with  minerals (MULTI-VITAMIN) TABS tablet Take 1 tablet by mouth daily 100 tablet 3     topiramate (TOPAMAX) 25 MG tablet Take 1 tablet (25 mg) by mouth 2 times daily 60 tablet 1     buPROPion (WELLBUTRIN XL) 150 MG 24 hr tablet Take 1 tablet (150 mg) by mouth every morning 30 tablet 1     aspirin (ASPIR-LOW) 81 MG EC tablet Take 1 tablet (81 mg) by mouth daily 90 tablet 3     gabapentin (NEURONTIN) 600 MG tablet Take 1 tablet (600 mg) by mouth 3 times daily 270 tablet 1     hydrOXYzine (VISTARIL) 25 MG capsule Take 1 capsule (25 mg) by mouth 3 times daily as needed for anxiety 120 capsule 2     Cholecalciferol (VITAMIN D) 2000 UNITS tablet Take 2,000 Units by mouth daily 100 tablet 3     clindamycin (CLINDAMAX) 1 % gel Apply 1 g topically 2 times daily 60 g 3     DIFFERIN 0.1 % cream Apply  topically At Bedtime. ANGELO.  Will replace Rx for adapalene (DIFFERIN) 0.1 % gel 30 g 5     Ferrous Sulfate GRAN by Misc.(Non-Drug; Combo Route) route.       ORDER FOR DME Equipment being ordered: thumb spica wrist brace 1 Device 0     polyethylene glycol (MIRALAX) powder Take 17 g by mouth daily as needed. 30 capful 11     STATIN NOT PRESCRIBED, INTENTIONAL, by Other route continuous prn.  0     ORDER FOR DME one light box for seasonal affective disorder 1 0     No current [facility]-ordered facility-administered medications on file.       Mental Status Exam     Appearance:  Casually dressed and Well groomed  Behavior/relationship to examiner/demeanor:  Cooperative, Engaged and Pleasant  Orientation: Oriented to person, place, time and situation  Psychomotor: WNL  Speech Rate:  Normal  Speech Spontaneity:  Normal  Mood:  better  Affect:  Appropriate/mood-congruent and Euthymic  Thought Process (Associations):  Logical, Linear and Goal directed  Thought Content:  no evidence of suicidal or homicidal ideation, denies suicidal ideation, intent or thoughts and patient denies auditory and visual hallucinations  Abnormal Perception:   None  Attention/Concentration:  Normal  Language:  Intact  Insight:  Fair  Judgment:  Good      Results     Vital signs: LMP 06/05/2013    Laboratory Data:   Lab Results   Component Value Date    WBC 4.8 03/03/2017    HGB 12.4 03/03/2017    HCT 38.3 03/03/2017     03/03/2017    CHOL 176 03/03/2017    TRIG 125 03/03/2017    HDL 56 03/03/2017    ALT 20 03/03/2017    AST 20 03/03/2017     03/06/2017    BUN 11 03/06/2017    CO2 25 03/06/2017    TSH 0.92 03/03/2017     Assessment & Plan      Jina Reeves is seen today for medication management.    Diagnosis  Axis 1: PTSD; alcohol abuse in recent remission; cocaine and cannabis in remote remission  Axis 2: deferred     Plan:  She chooses to continue current regimen including Wellbutrin XL 150mg qAM, topamax 25mg BID; hydroxyzine 25mg qHS PRN. She works with FORREST Riggs through [employer] [facility]. She anticipates being scheduled soon in a therapy clinic that specializes in trauma care. She saw a CM once through [employer]. Discussed lower risk of ASE with Wellbutrin and grapefruit juice. She requests to RTC in 8 weeks, sooner as needed.      She voices understanding of the treatment plan including discussion of options, risks/ benefits. She has clinic contact information and will seek emergency services if urgent or life threatening symptoms present. She understands risks associated with drug and alcohol use.       Over 50% of this time was spent counseling the patient and/or coordinating care regarding review of social and occupational functioning. In addition patient was counseled on health and wellness practices to augment medication treatment of symptoms. See note for details.    PHQ-9 score:  14  PHQ-9 SCORE 4/14/2017   Total Score 15     GAD7:   GAIL-7 SCORE 10/24/2016 3/10/2017 4/14/2017   Total Score - - -   Total Score 17 18 18     : 03/2017  Olive Akins, APRN CNP 5/31/2017

## 2017-05-31 NOTE — MR AVS SNAPSHOT
After Visit Summary   5/31/2017    Jina Reeves    MRN: [id number]           Patient Information     Date Of Birth          1960        Visit Information        Provider Department      5/31/2017 10:30 AM Olive Akins APRN [facility]        Today's Diagnoses     Major depressive disorder, recurrent episode, moderate (H)           Follow-ups after your visit        Follow-up notes from your care team     Return in about 8 weeks (around 7/26/2017), or if symptoms worsen or fail to improve.      Your next 10 appointments already scheduled     Jun 05, 2017 11:00 AM CDT   LAB with [facility] LAB ONLY   [facility]    [address] [place] [id number] [phone]           Patient must bring picture ID.  Patient should be prepared to give a urine specimen  Please do not eat 10-12 hours before your appointment if you are coming in fasting for labs on lipids, cholesterol, or glucose (sugar).  Pregnant women should follow their Care Team instructions. Water with medications is okay. Do not drink coffee or other fluids.   If you have concerns about taking  your medications, please ask at office or if scheduling via [employer], send a message by clicking on Secure Messaging, Message Your Care Team.            Jun 14, 2017  3:00 PM CDT   Office Visit with Vee Marlow MD   [facility]    [address] [place] [id number] [phone]           Bring a current list of meds and any records pertaining to this visit.  For Physicals, please bring immunization records and any forms needing to be filled out.  Please arrive 10 minutes early to complete paperwork.              Who to contact     Please call your clinic at [phone] to:    Ask questions about your health    Make or cancel appointments    Discuss your medicines    Learn about your test  results    Speak to your doctor   If you have compliments or concerns about an experience at your clinic, or if you wish to file a complaint, please contact [facility] Patient Relations at [phone] or email us at [email or web address]         Additional Information About Your Visit        [employer] Information     [employer] gives you secure access to your electronic health record. If you see a primary care provider, you can also send messages to your care team and make appointments. If you have questions, please call your primary care clinic.  If you do not have a primary care provider, please call [phone] and they will assist you.      Ludi is an electronic gateway that provides easy, online access to your medical records. With Ludi, you can request a clinic appointment, read your test results, renew a prescription or communicate with your care team.     To access your existing account, please contact your [facility] or call [phone] for assistance.        Care EveryWhere ID     This is your Care EveryWhere ID. This could be used by other organizations to access your [facility] medical records  [id number]        Your Vitals Were     Last Period                   06/05/2013            Blood Pressure from Last 3 Encounters:   05/09/17 127/81   04/14/17 110/86   04/06/17 138/86    Weight from Last 3 Encounters:   05/09/17 98.2 kg (216 lb 6.4 oz)   04/14/17 99 kg (218 lb 4.8 oz)   04/06/17 127.7 kg (281 lb 9.6 oz)              Today, you had the following     No orders found for display         Where to get your medicines      These medications were sent to [facility] [id number] [place], [place] - [address]  [address] [place] [address]     Phone:  [phone]     buPROPion 150 MG 24 hr tablet    topiramate 25 MG tablet          Primary Care Provider Office Phone # Fax #    Vee Marlow MD  [phone]       [facility] [address]  [facility] [address]        Thank you!     Thank you for choosing PSYCHIATRY CLINIC  for your care. Our goal is always to provide you with excellent care. Hearing back from our patients is one way we can continue to improve our services. Please take a few minutes to complete the written survey that you may receive in the mail after your visit with us. Thank you!             Your Updated Medication List - Protect others around you: Learn how to safely use, store and throw away your medicines at www.disposemymeds.org.          This list is accurate as of: 5/31/17 11:20 AM.  Always use your most recent med list.                   Brand Name Dispense Instructions for use    aspirin 81 MG EC tablet    ASPIR-LOW    90 tablet    Take 1 tablet (81 mg) by mouth daily       buPROPion 150 MG 24 hr tablet    WELLBUTRIN XL    30 tablet    Take 1 tablet (150 mg) by mouth every morning       clindamycin 1 % topical gel    CLINDAMAX    60 g    Apply 1 g topically 2 times daily       DIFFERIN 0.1 % cream   Generic drug:  adapalene     30 g    Apply  topically At Bedtime. ANGELO. Will replace Rx for adapalene (DIFFERIN) 0.1 % gel       Ferrous Sulfate Gran      by Misc.(Non-Drug; Combo Route) route.       gabapentin 600 MG tablet    NEURONTIN    270 tablet    Take 1 tablet (600 mg) by mouth 3 times daily       hydrOXYzine 25 MG capsule    VISTARIL    120 capsule    Take 1 capsule (25 mg) by mouth 3 times daily as needed for anxiety       multivitamin, therapeutic with minerals Tabs tablet     100 tablet    Take 1 tablet by mouth daily       * order for DME     1    one light box for seasonal affective disorder       * order for DME     1 Device    Equipment being ordered: thumb spica wrist brace       oxyCODONE-acetaminophen 5-325 MG per tablet    PERCOCET    90 tablet    Take 1 tablet by mouth every 8 hours as needed for pain       polyethylene glycol powder     MIRALAX    30 capful    Take 17 g by mouth daily as needed.       * STATIN NOT PRESCRIBED (INTENTIONAL)      by Other route continuous prn.       topiramate 25 MG tablet    TOPAMAX    60 tablet    Take 1 tablet (25 mg) by mouth 2 times daily       * vitamin D 2000 UNITS tablet     100 tablet    Take 2,000 Units by mouth daily       * vitamin D 2000 UNITS tablet     30 tablet    Take 1 tablet by mouth daily       * Notice:  This list has 5 medication(s) that are the same as other medications prescribed for you. Read the directions carefully, and ask your doctor or other care provider to review them with you.

## 2017-06-01 ASSESSMENT — PATIENT HEALTH QUESTIONNAIRE - PHQ9: SUM OF ALL RESPONSES TO PHQ QUESTIONS 1-9: 14

## 2017-06-05 ENCOUNTER — TELEPHONE (OUTPATIENT)
Dept: FAMILY MEDICINE | Facility: CLINIC | Age: 57
End: 2017-06-05

## 2017-06-05 DIAGNOSIS — G89.4 CHRONIC PAIN SYNDROME: ICD-10-CM

## 2017-06-05 DIAGNOSIS — M48.061 STENOSIS, SPINAL, LUMBAR: ICD-10-CM

## 2017-06-05 DIAGNOSIS — M47.816 FACET ARTHROPATHY, LUMBAR: ICD-10-CM

## 2017-06-05 DIAGNOSIS — E55.9 VITAMIN D DEFICIENCY DISEASE: ICD-10-CM

## 2017-06-05 PROCEDURE — 36415 COLL VENOUS BLD VENIPUNCTURE: CPT | Performed by: FAMILY MEDICINE

## 2017-06-05 PROCEDURE — 82306 VITAMIN D 25 HYDROXY: CPT | Performed by: FAMILY MEDICINE

## 2017-06-05 RX ORDER — OXYCODONE AND ACETAMINOPHEN 5; 325 MG/1; MG/1
1 TABLET ORAL EVERY 8 HOURS PRN
Qty: 90 TABLET | Refills: 0 | Status: SHIPPED | OUTPATIENT
Start: 2017-06-05 | End: 2017-07-07

## 2017-06-05 NOTE — TELEPHONE ENCOUNTER
Reason for Call:  Medication or medication refill:     Do you use a [facility]?  Name of the pharmacy and phone number for the current request:  Pt will come to clinic to  hard copy of Rx      Name of the medication requested: Oxycodone-acetaminophen (PERCOCET) 5-325 MG per tablet     Can we leave a detailed message on this number? YES     Phone number patient can be reached at: Home number on file [phone] (home)     Best Time: Anytime     Call taken on 6/5/2017 at 9:40 AM by Paul Garrett

## 2017-06-05 NOTE — TELEPHONE ENCOUNTER
Percocet 5-325 mg      Last Written Prescription Date: 05/10/17  Last Fill Quantity: 90,  # refills: 0   Last Office Visit with [facility] or [facility] prescribing provider: 01/14/17                                         Next 5 appointments (look out 90 days)     Jun 14, 2017  3:00 PM CDT   Office Visit with Vee Marlow MD   [facility]    [address] [place] [id number]   [phone]                    Routing refill request to provider for review/approval because:  Drug not on the [facility] refill protocol   Yanci Francis RN

## 2017-06-05 NOTE — PATIENT INSTRUCTIONS
Reason for Call:  Medication or medication refill:    Do you use a [facility]?  Name of the pharmacy and phone number for the current request:  Pt will come to clinic to  hard copy of Rx     Name of the medication requested: Oxycodone-acetaminophen (PERCOCET) 5-325 MG per tablet    Can we leave a detailed message on this number? YES    Phone number patient can be reached at: Home number on file [phone] (home)    Best Time: Anytime    Call taken on 6/5/2017 at 9:40 AM by Paul Garrett

## 2017-06-06 LAB
DEPRECATED CALCIDIOL+CALCIFEROL SERPL-MC: 30 UG/L (ref 20–75)
VITAMIN D2 SERPL-MCNC: 11 UG/L
VITAMIN D3 SERPL-MCNC: 19 UG/L

## 2017-06-17 ENCOUNTER — HEALTH MAINTENANCE LETTER (OUTPATIENT)
Age: 57
End: 2017-06-17

## 2017-06-29 ENCOUNTER — CARE COORDINATION (OUTPATIENT)
Dept: CARE COORDINATION | Facility: CLINIC | Age: 57
End: 2017-06-29

## 2017-07-07 ENCOUNTER — TELEPHONE (OUTPATIENT)
Dept: FAMILY MEDICINE | Facility: CLINIC | Age: 57
End: 2017-07-07

## 2017-07-07 DIAGNOSIS — M48.061 STENOSIS, SPINAL, LUMBAR: ICD-10-CM

## 2017-07-07 DIAGNOSIS — M47.816 FACET ARTHROPATHY, LUMBAR: ICD-10-CM

## 2017-07-07 DIAGNOSIS — G89.4 CHRONIC PAIN SYNDROME: ICD-10-CM

## 2017-07-07 NOTE — TELEPHONE ENCOUNTER
Reason for Call:  Medication or medication refill:    Do you use a [facility]?  Name of the pharmacy and phone number for the current request:  WRITTEN PRESCRIPTION REQUESTED    Name of the medication requested: Percocet 5-325 mg    Other request: please call when approved    Can we leave a detailed message on this number? YES    Phone number patient can be reached at: [phone]    Best Time: any    Call taken on 7/7/2017 at 3:12 PM by Iqra Walden

## 2017-07-07 NOTE — TELEPHONE ENCOUNTER
Percocet      Last Written Prescription Date:  06/05/2017  Last Fill Quantity: 90,   # refills: 0  Last Office Visit with [facility] or  [facility] prescribing provider: 04/14/2017  Future Office visit:       Routing refill request to provider for review/approval because:  Drug not on the [facility] refill protocol or controlled substance  Angela Lama RN

## 2017-07-10 RX ORDER — OXYCODONE AND ACETAMINOPHEN 5; 325 MG/1; MG/1
1 TABLET ORAL EVERY 8 HOURS PRN
Qty: 90 TABLET | Refills: 0 | Status: SHIPPED | OUTPATIENT
Start: 2017-07-10 | End: 2017-08-02

## 2017-07-10 NOTE — TELEPHONE ENCOUNTER
please place rx at front for  and update patient when completed.    She is due for OV- please schedule within one month for med check.  Please have her leave urine when picking up Rx

## 2017-07-10 NOTE — TELEPHONE ENCOUNTER
Reason for Call:  Other prescription    Detailed comments: Jina called to ok for Edson Baker to  her Rx for Percocet. Was told he needed photo ID    Phone Number Patient can be reached at: Home number on file [phone] (home)    Best Time: Any    Can we leave a detailed message on this number? YES    Call taken on 7/10/2017 at 2:40 PM by Mat Palmer

## 2017-08-02 ENCOUNTER — TELEPHONE (OUTPATIENT)
Dept: FAMILY MEDICINE | Facility: CLINIC | Age: 57
End: 2017-08-02

## 2017-08-02 DIAGNOSIS — M48.061 STENOSIS, SPINAL, LUMBAR: ICD-10-CM

## 2017-08-02 DIAGNOSIS — G89.4 CHRONIC PAIN SYNDROME: ICD-10-CM

## 2017-08-02 DIAGNOSIS — M47.816 FACET ARTHROPATHY, LUMBAR: ICD-10-CM

## 2017-08-02 RX ORDER — OXYCODONE AND ACETAMINOPHEN 5; 325 MG/1; MG/1
1 TABLET ORAL EVERY 8 HOURS PRN
Qty: 90 TABLET | Refills: 0 | Status: SHIPPED | OUTPATIENT
Start: 2017-08-02 | End: 2017-09-08

## 2017-08-02 NOTE — TELEPHONE ENCOUNTER
Routing refill request to provider for review/approval because:  Drug not on the [facility] refill protocol   Yanci Springer RN.

## 2017-08-02 NOTE — TELEPHONE ENCOUNTER
Reason for Call:  Medication or medication refill:    Do you use a [facility]?  Name of the pharmacy and phone number for the current request:  WRITTEN PRESCRIPTION REQUESTED    Name of the medication requested: oxyCODONE-acetaminophen (PERCOCET) 5-325 MG per tablet    Other request: patient is requesting refill to be completed by appointment day. Patient is also requesting a call from care team to notify when ready. Thanks.     Can we leave a detailed message on this number? YES    Phone number patient can be reached at: Cell number on file:    Telephone Information:   Mobile [phone]       Best Time: Anytime     Call taken on 8/2/2017 at 3:14 PM by Alma Cavazos

## 2017-08-03 NOTE — TELEPHONE ENCOUNTER
rx not due till 8/10- have updated the Rx for that fill date. please place rx at front for  and update patient when completed and update her on date able to fill

## 2017-08-05 DIAGNOSIS — F33.1 MAJOR DEPRESSIVE DISORDER, RECURRENT EPISODE, MODERATE (H): ICD-10-CM

## 2017-08-07 NOTE — TELEPHONE ENCOUNTER
buPROPion (WELLBUTRIN XL) 150 MG 24 hr tablet       Last Written Prescription Date: 5/31/17  Last Fill Quantity: 30; # refills: 1  Last Office Visit with [facility], [facility] or [facility] prescribing provider:  4/14/17        Last PHQ-9 score on record=   PHQ-9 SCORE 5/31/2017   Total Score -   Total Score 14       Lab Results   Component Value Date    AST 20 03/03/2017     Lab Results   Component Value Date    ALT 20 03/03/2017     hydrOXYzine (VISTARIL) 25 MG capsule      Last Written Prescription Date: 12/1/16  Last Fill Quantity: 120,  # refills: 2   Last Office Visit with [facility], [facility] or [facility] prescribing provider: 4/14/17                                                 topiramate (TOPAMAX) 25 MG tablet       Last Written Prescription Date: 5/31/17  Last Fill Quantity: 60, # refills: 1    Last Office Visit with [facility], [facility] or [facility] prescribing provider:  4/14/17   Future Office Visit:      Creatinine   Date Value Ref Range Status   03/06/2017 0.78 0.52 - 1.04 mg/dL Final

## 2017-08-09 RX ORDER — BUPROPION HYDROCHLORIDE 150 MG/1
TABLET ORAL
Qty: 30 TABLET | Refills: 0 | Status: SHIPPED | OUTPATIENT
Start: 2017-08-09

## 2017-08-09 RX ORDER — HYDROXYZINE PAMOATE 25 MG/1
CAPSULE ORAL
Qty: 120 CAPSULE | Refills: 0 | Status: SHIPPED | OUTPATIENT
Start: 2017-08-09 | End: 2017-09-12

## 2017-08-09 RX ORDER — TOPIRAMATE 25 MG/1
TABLET, FILM COATED ORAL
Qty: 60 TABLET | Refills: 0 | Status: SHIPPED | OUTPATIENT
Start: 2017-08-09

## 2017-08-09 NOTE — TELEPHONE ENCOUNTER
Routing refill request to provider for review/approval because:  Drug not on the [facility] refill protocol - associated diagnosis hydroxyzine and Topamax   Patient needs to be seen because:  Due for OV.   Patient does have upcoming appointment on 8/23/17.    Terri Alonso RN

## 2017-08-17 ENCOUNTER — TELEPHONE (OUTPATIENT)
Dept: FAMILY MEDICINE | Facility: CLINIC | Age: 57
End: 2017-08-17

## 2017-08-17 ENCOUNTER — OFFICE VISIT (OUTPATIENT)
Dept: FAMILY MEDICINE | Facility: CLINIC | Age: 57
End: 2017-08-17
Payer: COMMERCIAL

## 2017-08-17 VITALS
DIASTOLIC BLOOD PRESSURE: 76 MMHG | WEIGHT: 220 LBS | RESPIRATION RATE: 18 BRPM | TEMPERATURE: 98 F | BODY MASS INDEX: 32.58 KG/M2 | HEIGHT: 69 IN | HEART RATE: 70 BPM | SYSTOLIC BLOOD PRESSURE: 120 MMHG

## 2017-08-17 DIAGNOSIS — R60.0 PERIPHERAL EDEMA: Primary | ICD-10-CM

## 2017-08-17 DIAGNOSIS — F33.1 MAJOR DEPRESSIVE DISORDER, RECURRENT EPISODE, MODERATE (H): ICD-10-CM

## 2017-08-17 DIAGNOSIS — F10.10 ALCOHOL ABUSE, EPISODIC DRINKING BEHAVIOR: ICD-10-CM

## 2017-08-17 DIAGNOSIS — G89.4 CHRONIC PAIN SYNDROME: ICD-10-CM

## 2017-08-17 DIAGNOSIS — Z86.0100 HISTORY OF COLONIC POLYPS: ICD-10-CM

## 2017-08-17 DIAGNOSIS — Z12.11 SCREEN FOR COLON CANCER: ICD-10-CM

## 2017-08-17 LAB
ALBUMIN SERPL-MCNC: 3.4 G/DL (ref 3.4–5)
ALP SERPL-CCNC: 79 U/L (ref 40–150)
ALT SERPL W P-5'-P-CCNC: 16 U/L (ref 0–50)
AMPHETAMINES UR QL: NOT DETECTED NG/ML
ANION GAP SERPL CALCULATED.3IONS-SCNC: 3 MMOL/L (ref 3–14)
AST SERPL W P-5'-P-CCNC: 14 U/L (ref 0–45)
BARBITURATES UR QL SCN: NOT DETECTED NG/ML
BENZODIAZ UR QL SCN: NOT DETECTED NG/ML
BILIRUB SERPL-MCNC: 0.5 MG/DL (ref 0.2–1.3)
BUN SERPL-MCNC: 9 MG/DL (ref 7–30)
BUPRENORPHINE UR QL: NOT DETECTED NG/ML
CALCIUM SERPL-MCNC: 9.4 MG/DL (ref 8.5–10.1)
CANNABINOIDS UR QL: NOT DETECTED NG/ML
CHLORIDE SERPL-SCNC: 110 MMOL/L (ref 94–109)
CO2 SERPL-SCNC: 30 MMOL/L (ref 20–32)
COCAINE UR QL SCN: NOT DETECTED NG/ML
CREAT SERPL-MCNC: 0.64 MG/DL (ref 0.52–1.04)
D-METHAMPHET UR QL: NOT DETECTED NG/ML
ETHANOL UR QL SCN: NEGATIVE
GFR SERPL CREATININE-BSD FRML MDRD: >90 ML/MIN/1.7M2
GLUCOSE SERPL-MCNC: 90 MG/DL (ref 70–99)
METHADONE UR QL SCN: NOT DETECTED NG/ML
OPIATES UR QL SCN: NOT DETECTED NG/ML
OXYCODONE UR QL SCN: ABNORMAL NG/ML
PCP UR QL SCN: NOT DETECTED NG/ML
POTASSIUM SERPL-SCNC: 3.8 MMOL/L (ref 3.4–5.3)
PROPOXYPH UR QL: NOT DETECTED NG/ML
PROT SERPL-MCNC: 6.9 G/DL (ref 6.8–8.8)
SODIUM SERPL-SCNC: 143 MMOL/L (ref 133–144)
TRICYCLICS UR QL SCN: NOT DETECTED NG/ML

## 2017-08-17 PROCEDURE — 80320 DRUG SCREEN QUANTALCOHOLS: CPT | Performed by: FAMILY MEDICINE

## 2017-08-17 PROCEDURE — 80053 COMPREHEN METABOLIC PANEL: CPT | Performed by: PHYSICIAN ASSISTANT

## 2017-08-17 PROCEDURE — 99214 OFFICE O/P EST MOD 30 MIN: CPT | Performed by: PHYSICIAN ASSISTANT

## 2017-08-17 PROCEDURE — 80306 DRUG TEST PRSMV INSTRMNT: CPT | Performed by: FAMILY MEDICINE

## 2017-08-17 PROCEDURE — 36415 COLL VENOUS BLD VENIPUNCTURE: CPT | Performed by: PHYSICIAN ASSISTANT

## 2017-08-17 ASSESSMENT — PATIENT HEALTH QUESTIONNAIRE - PHQ9
SUM OF ALL RESPONSES TO PHQ QUESTIONS 1-9: 16
5. POOR APPETITE OR OVEREATING: NOT AT ALL

## 2017-08-17 ASSESSMENT — ANXIETY QUESTIONNAIRES
5. BEING SO RESTLESS THAT IT IS HARD TO SIT STILL: NEARLY EVERY DAY
IF YOU CHECKED OFF ANY PROBLEMS ON THIS QUESTIONNAIRE, HOW DIFFICULT HAVE THESE PROBLEMS MADE IT FOR YOU TO DO YOUR WORK, TAKE CARE OF THINGS AT HOME, OR GET ALONG WITH OTHER PEOPLE: VERY DIFFICULT
2. NOT BEING ABLE TO STOP OR CONTROL WORRYING: MORE THAN HALF THE DAYS
7. FEELING AFRAID AS IF SOMETHING AWFUL MIGHT HAPPEN: SEVERAL DAYS
6. BECOMING EASILY ANNOYED OR IRRITABLE: MORE THAN HALF THE DAYS
1. FEELING NERVOUS, ANXIOUS, OR ON EDGE: SEVERAL DAYS
3. WORRYING TOO MUCH ABOUT DIFFERENT THINGS: MORE THAN HALF THE DAYS
GAD7 TOTAL SCORE: 11

## 2017-08-17 ASSESSMENT — PAIN SCALES - GENERAL: PAINLEVEL: MILD PAIN (3)

## 2017-08-17 NOTE — NURSING NOTE
"Chief Complaint   Patient presents with     Edema       Initial /76 (BP Location: Right arm, Patient Position: Chair, Cuff Size: Adult Large)  Pulse 70  Temp 98  F (36.7  C) (Oral)  Resp 18  Ht 1.753 m (5' 9\")  Wt 99.8 kg (220 lb)  LMP 06/05/2013  Breastfeeding? No  BMI 32.49 kg/m2 Estimated body mass index is 32.49 kg/(m^2) as calculated from the following:    Height as of this encounter: 1.753 m (5' 9\").    Weight as of this encounter: 99.8 kg (220 lb).  Medication Reconciliation: complete     Kimmie Do MA       "

## 2017-08-17 NOTE — MR AVS SNAPSHOT
After Visit Summary   8/17/2017    Jina Reeves    MRN: [id number]           Patient Information     Date Of Birth          1960        Visit Information        Provider Department      8/17/2017 11:00 AM Belia Carlton PA-C [facility]        Today's Diagnoses     Screen for colon cancer    -  1    History of colonic polyps        Peripheral edema          Care Instructions    We will send a letter with normal results  Or call if abnormal.             Follow-ups after your visit        Additional Services     GASTROENTEROLOGY ADULT REF PROCEDURE ONLY       Last Lab Result: Creatinine (mg/dL)       Date                     Value                 03/06/2017               0.78             ----------  Body mass index is 32.49 kg/(m^2).     Needed:  No  Language:  English    Patient will be contacted to schedule procedure.     Please be aware that coverage of these services is subject to the terms and limitations of your health insurance plan.  Call member services at your health plan with any benefit or coverage questions.  Any procedures must be performed at a [place] facility OR coordinated by your clinic's referral office.    Please bring the following with you to your appointment:    (1) Any X-Rays, CTs or MRIs which have been performed.  Contact the facility where they were done to arrange for  prior to your scheduled appointment.    (2) List of current medications   (3) This referral request   (4) Any documents/labs given to you for this referral                  Your next 10 appointments already scheduled     Sep 05, 2017 12:30 PM CDT   Adult Med Follow UP with HALIE Ray CNP   Psychiatry Clinic ([facility])    [address] [facility] [address] [phone]              Who to contact     If you have questions or need follow up information about today's clinic visit or your  "schedule please contact [facility] directly at [phone].  Normal or non-critical lab and imaging results will be communicated to you by MyChart, letter or phone within 4 business days after the clinic has received the results. If you do not hear from us within 7 days, please contact the clinic through MyChart or phone. If you have a critical or abnormal lab result, we will notify you by phone as soon as possible.  Submit refill requests through [employer] or call your pharmacy and they will forward the refill request to us. Please allow 3 business days for your refill to be completed.          Additional Information About Your Visit        [employer] Information     [employer] lets you send messages to your doctor, view your test results, renew your prescriptions, schedule appointments and more. To sign up, go to [email or web address] . Click on \"Log in\" on the left side of the screen, which will take you to the Welcome page. Then click on \"Sign up Now\" on the right side of the page.     You will be asked to enter the access code listed below, as well as some personal information. Please follow the directions to create your username and password.     Your access code is: [email or web address]  Expires: 11/15/2017 11:03 AM     Your access code will  in 90 days. If you need help or a new code, please call your [facility] clinic or [phone].        Care EveryWhere ID     This is your Care EveryWhere ID. This could be used by other organizations to access your [facility] medical records  [id number]        Your Vitals Were     Pulse Temperature Respirations Height Last Period Breastfeeding?    70 98  F (36.7  C) (Oral) 18 1.753 m (5' 9\") 2013 No    BMI (Body Mass Index)                   32.49 kg/m2            Blood Pressure from Last 3 Encounters:   17 120/76   17 127/81   17 110/86    Weight from Last 3 Encounters:   17 99.8 kg (220 lb)   17 98.2 kg (216 lb 6.4 oz) "   04/14/17 99 kg (218 lb 4.8 oz)              We Performed the Following     Comprehensive metabolic panel     GASTROENTEROLOGY ADULT REF PROCEDURE ONLY        Primary Care Provider Office Phone # Fax #    Vee Marlow [phone] [address]  [facility] [address]        Equal Access to Services     [facility]: Hadii aad ku hadasho Soomaali, waaxda luqadaha, qaybta kaalmada adeegyada, waxay idiin hayaan adeeg kharash la'aan . So [facility] [phone].    ATENCIÓN: Si habla español, tiene a hill disposición servicios gratuitos de asistencia lingüística. Cyn al [phone].    We comply with applicable federal civil rights laws and [place] laws. We do not discriminate on the basis of race, color, national origin, age, disability sex, sexual orientation or gender identity.            Thank you!     Thank you for choosing [facility]  for your care. Our goal is always to provide you with excellent care. Hearing back from our patients is one way we can continue to improve our services. Please take a few minutes to complete the written survey that you may receive in the mail after your visit with us. Thank you!             Your Updated Medication List - Protect others around you: Learn how to safely use, store and throw away your medicines at www.disposemymeds.org.          This list is accurate as of: 8/17/17 11:03 AM.  Always use your most recent med list.                   Brand Name Dispense Instructions for use Diagnosis    aspirin 81 MG EC tablet    ASPIR-LOW    90 tablet    Take 1 tablet (81 mg) by mouth daily    Hyperlipidemia LDL goal <130       buPROPion 150 MG 24 hr tablet    WELLBUTRIN XL    30 tablet    TAKE 1 TABLET (150 MG) BY MOUTH EVERY MORNING    Major depressive disorder, recurrent episode, moderate (H)       cholecalciferol 5000 UNITS Tabs tablet    vitamin D3    100 tablet    Take 1 tablet (5,000 Units) by mouth daily    Vitamin D  deficiency disease       clindamycin 1 % topical gel    CLINDAMAX    60 g    Apply 1 g topically 2 times daily    Facial cellulitis       DIFFERIN 0.1 % cream   Generic drug:  adapalene     30 g    Apply  topically At Bedtime. ANGELO. Will replace Rx for adapalene (DIFFERIN) 0.1 % gel    Acne, unspecified acne type       Ferrous Sulfate Gran      by Misc.(Non-Drug; Combo Route) route.        gabapentin 600 MG tablet    NEURONTIN    270 tablet    Take 1 tablet (600 mg) by mouth 3 times daily    Stenosis, spinal, lumbar, Facet arthropathy, lumbar       hydrOXYzine 25 MG capsule    VISTARIL    120 capsule    TAKE 1 CAPSULE (25 MG) BY MOUTH 3 TIMES DAILY AS NEEDED FOR ANXIETY    Major depressive disorder, recurrent episode, moderate (H)       multivitamin, therapeutic with minerals Tabs tablet     100 tablet    Take 1 tablet by mouth daily    Alcohol abuse, episodic drinking behavior       * order for DME     1    one light box for seasonal affective disorder    Seasonal affective disorder (H)       * order for DME     1 Device    Equipment being ordered: thumb spica wrist brace    Pain in wrist, right       oxyCODONE-acetaminophen 5-325 MG per tablet    PERCOCET    90 tablet    Take 1 tablet by mouth every 8 hours as needed for pain Fill on or after 8/10/17    Facet arthropathy, lumbar, Stenosis, spinal, lumbar, Chronic pain syndrome       polyethylene glycol powder    MIRALAX    30 capful    Take 17 g by mouth daily as needed.    Constipation       * STATIN NOT PRESCRIBED (INTENTIONAL)      by Other route continuous prn.    Glucose intolerance (pre-diabetes)       topiramate 25 MG tablet    TOPAMAX    60 tablet    TAKE 1 TABLET (25 MG) BY MOUTH 2 TIMES DAILY    Major depressive disorder, recurrent episode, moderate (H)       * Notice:  This list has 3 medication(s) that are the same as other medications prescribed for you. Read the directions carefully, and ask your doctor or other care provider to review them with you.

## 2017-08-17 NOTE — TELEPHONE ENCOUNTER
What type of form? Disability  What day did you drop off your forms? Thursday, August 17  Is there a due date? asap (7-10 business days to compete forms)   How would you like to receive these forms? Please call the patient when completed @ [phone]    What is the best number to contact you? Cell [phone]  What time works best to contact you with in 4 hrs? anytime  Is it okay to leave a message? Yes  Radha Funk (Auto signs name of person logged into [employer])

## 2017-08-17 NOTE — PROGRESS NOTES
"  SUBJECTIVE:                                                    Jina Reeves is a 56 year old female who presents to clinic today for the following health issues:    Edema    Onset: Monday    Description:   Location: bilateral feet  Character: Dull ache     Intensity: severe    Progression of Symptoms: better    Accompanying Signs & Symptoms:  Other symptoms: tingling, itching    History:   Previous similar pain: no       Precipitating factors:   Trauma or overuse: no     Alleviating factors:  Improved by: rest/inactivity    Therapies Tried and outcome: rest and elevation      No chest pain or shortness of breath.  Reports edema much improved today.   Was up on feet more to help friend move- she stood holding door open.  Tries to limit sodium.  Uses . Dash and very little salt- doesn't salt food.      Depression comes and goes.  Sometimes feels \"better off not being in the world.\"  Comes and goes.  \"Thinks of things can't do no more. \"  \"what's point of being here?\" sees therapist every 3-4 months.  Denies any active thoughts of harming self- declines med adjustment     PHQ-9 SCORE 3/10/2017 4/14/2017 8/17/2017   Total Score - - -   Total Score 14 15 16   Some encounter information is confidential and restricted. Go to Review Flowsheets activity to see all data.     GAIL-7 SCORE 3/10/2017 4/14/2017 8/17/2017   Total Score - - -   Total Score 18 18 11         Problem list and histories reviewed & adjusted, as indicated.  Additional history: as documented    Patient Active Problem List   Diagnosis     Obesity     Cocaine abuse in remission     Hyperlipidemia LDL goal <130     Glucose intolerance (pre-diabetes)     Iron deficiency anemia     Vitamin D deficiency     Health Care Home (Not Active)      Anxiety state     Major depressive disorder, recurrent episode, moderate (H)     Acne     Family history of colon cancer     Facet arthropathy, lumbar     Stenosis, spinal, lumbar     Alcohol abuse, episodic " drinking behavior     Chronic pain     Vitamin D deficiency disease     Depression     History of syphilis     Past Surgical History:   Procedure Laterality Date     COLONOSCOPY  10/29/2013    Procedure: COLONOSCOPY;  COLONOSCOPY-SCREENING / VITO;  Surgeon: Westley Larios MD;  Location: [facility] OR     COLONOSCOPY  10/29/2013    Procedure: COMBINED COLONOSCOPY, SINGLE BIOPSY/POLYPECTOMY BY BIOPSY;;  Surgeon: Westley Larios MD;  Location: [facility] OR       Social History   Substance Use Topics     Smoking status: Former Smoker     Smokeless tobacco: Never Used      Comment: quit  over 20 yrs ago     Alcohol use Yes     Family History   Problem Relation Age of Onset     Cancer - colorectal Sister      age 38     CANCER No family hx of      DIABETES No family hx of      Hypertension No family hx of      CEREBROVASCULAR DISEASE No family hx of      Thyroid Disease No family hx of      Glaucoma No family hx of      Macular Degeneration No family hx of          Current Outpatient Prescriptions   Medication Sig Dispense Refill     topiramate (TOPAMAX) 25 MG tablet TAKE 1 TABLET (25 MG) BY MOUTH 2 TIMES DAILY 60 tablet 0     buPROPion (WELLBUTRIN XL) 150 MG 24 hr tablet TAKE 1 TABLET (150 MG) BY MOUTH EVERY MORNING 30 tablet 0     hydrOXYzine (VISTARIL) 25 MG capsule TAKE 1 CAPSULE (25 MG) BY MOUTH 3 TIMES DAILY AS NEEDED FOR ANXIETY 120 capsule 0     oxyCODONE-acetaminophen (PERCOCET) 5-325 MG per tablet Take 1 tablet by mouth every 8 hours as needed for pain Fill on or after 8/10/17 90 tablet 0     cholecalciferol (VITAMIN D3) 5000 UNITS TABS tablet Take 1 tablet (5,000 Units) by mouth daily 100 tablet 3     multivitamin, therapeutic with minerals (MULTI-VITAMIN) TABS tablet Take 1 tablet by mouth daily 100 tablet 3     aspirin (ASPIR-LOW) 81 MG EC tablet Take 1 tablet (81 mg) by mouth daily 90 tablet 3     gabapentin (NEURONTIN) 600 MG tablet Take 1 tablet (600 mg) by mouth 3 times daily 270 tablet 1      "clindamycin (CLINDAMAX) 1 % gel Apply 1 g topically 2 times daily 60 g 3     DIFFERIN 0.1 % cream Apply  topically At Bedtime. ANGELO.  Will replace Rx for adapalene (DIFFERIN) 0.1 % gel 30 g 5     Ferrous Sulfate GRAN by Misc.(Non-Drug; Combo Route) route.       ORDER FOR DME Equipment being ordered: thumb spica wrist brace 1 Device 0     polyethylene glycol (MIRALAX) powder Take 17 g by mouth daily as needed. 30 capful 11     STATIN NOT PRESCRIBED, INTENTIONAL, by Other route continuous prn.  0     ORDER FOR DME one light box for seasonal affective disorder 1 0         Reviewed and updated as needed this visit by clinical staff     Reviewed and updated as needed this visit by Provider         ROS:  Constitutional, HEENT, cardiovascular, pulmonary, gi and gu systems are negative, except as otherwise noted.      OBJECTIVE:   /76 (BP Location: Right arm, Patient Position: Chair, Cuff Size: Adult Large)  Pulse 70  Temp 98  F (36.7  C) (Oral)  Resp 18  Ht 1.753 m (5' 9\")  Wt 99.8 kg (220 lb)  LMP 06/05/2013  Breastfeeding? No  BMI 32.49 kg/m2  Body mass index is 32.49 kg/(m^2).  GENERAL: healthy, alert and no distress  NECK: no adenopathy, no asymmetry, masses, or scars and thyroid normal to palpation  RESP: lungs clear to auscultation - no rales, rhonchi or wheezes  CV: regular rate and rhythm, normal S1 S2, no S3 or S4, no murmur, click or rub, no peripheral edema and peripheral pulses strong  ABDOMEN: soft, nontender, no hepatosplenomegaly, no masses and bowel sounds normal  MS: no gross musculoskeletal defects noted, no edema    Diagnostic Test Results:  Results for orders placed or performed in visit on 08/17/17   Comprehensive metabolic panel   Result Value Ref Range    Sodium 143 133 - 144 mmol/L    Potassium 3.8 3.4 - 5.3 mmol/L    Chloride 110 (H) 94 - 109 mmol/L    Carbon Dioxide 30 20 - 32 mmol/L    Anion Gap 3 3 - 14 mmol/L    Glucose 90 70 - 99 mg/dL    Urea Nitrogen 9 7 - 30 mg/dL    Creatinine " "0.64 0.52 - 1.04 mg/dL    GFR Estimate >90 >60 mL/min/1.7m2    GFR Estimate If Black >90 >60 mL/min/1.7m2    Calcium 9.4 8.5 - 10.1 mg/dL    Bilirubin Total 0.5 0.2 - 1.3 mg/dL    Albumin 3.4 3.4 - 5.0 g/dL    Protein Total 6.9 6.8 - 8.8 g/dL    Alkaline Phosphatase 79 40 - 150 U/L    ALT 16 0 - 50 U/L    AST 14 0 - 45 U/L       ASSESSMENT/PLAN:         BMI:   Estimated body mass index is 32.49 kg/(m^2) as calculated from the following:    Height as of this encounter: 1.753 m (5' 9\").    Weight as of this encounter: 99.8 kg (220 lb).   Weight management plan: Discussed healthy diet and exercise guidelines and patient will follow up in 6 months in clinic to re-evaluate.        1. Peripheral edema  Will check metabolic panel- no edema on exam today  - Comprehensive metabolic panel    2. Major depressive disorder, recurrent episode, moderate (H)  Stable per her report- declines med adjustment- has therapist    3. Screen for colon cancer    - GASTROENTEROLOGY ADULT REF PROCEDURE ONLY    4. History of colonic polyps    - GASTROENTEROLOGY ADULT REF PROCEDURE ONLY    Patient Instructions   We will send a letter with normal results  Or call if abnormal.          Belia Carlton PA-C  [facility]  "

## 2017-08-18 ASSESSMENT — ANXIETY QUESTIONNAIRES: GAD7 TOTAL SCORE: 11

## 2017-08-23 ENCOUNTER — CARE COORDINATION (OUTPATIENT)
Dept: CARE COORDINATION | Facility: CLINIC | Age: 57
End: 2017-08-23

## 2017-08-23 NOTE — PROGRESS NOTES
8/23/2017 Clinic Care Coordination Contact  Social Work   Situation: Patient chart reviewed by care coordinator.    Background: Depression, anxiety, chronic pain.    Assessment: Recently seen in clinic 8/17/17.  Next appt with PCP is 8/30/17.  Continues to see her therapist at the [facility]. Next appt is 9/05/17 .  Noted Pt felt her Depression stable. Pt has SW contact info.     Plan/Recommendations: SW will reach out to pt in 1 month.     Keely Sparks [facility]  , Clinic Care Coordination  Clinics:  Karen Patel [place], [place]  [phone]   8/23/2017   8:43 AM

## 2017-09-08 ENCOUNTER — TELEPHONE (OUTPATIENT)
Dept: FAMILY MEDICINE | Facility: CLINIC | Age: 57
End: 2017-09-08

## 2017-09-08 DIAGNOSIS — G89.4 CHRONIC PAIN SYNDROME: ICD-10-CM

## 2017-09-08 DIAGNOSIS — M48.061 STENOSIS, SPINAL, LUMBAR: ICD-10-CM

## 2017-09-08 DIAGNOSIS — M47.816 FACET ARTHROPATHY, LUMBAR: ICD-10-CM

## 2017-09-08 RX ORDER — OXYCODONE AND ACETAMINOPHEN 5; 325 MG/1; MG/1
1 TABLET ORAL EVERY 8 HOURS PRN
Qty: 90 TABLET | Refills: 0 | Status: SHIPPED | OUTPATIENT
Start: 2017-09-08 | End: 2017-10-10

## 2017-09-08 NOTE — TELEPHONE ENCOUNTER
Reason for Call:  Prescription for Percocet    Detailed comments: Patient picked up Rx but the date was written as 10/10.  It should be 9/10    Please call patient to advise.    Phone Number Patient can be reached at: Cell number on file:    Telephone Information:   Mobile [phone]       Best Time:  anytime      Can we leave a detailed message on this number? YES    Call taken on 9/8/2017 at 4:14 PM by Nancy Oneill

## 2017-09-08 NOTE — TELEPHONE ENCOUNTER
Routing refill request to provider for review/approval because:  Drug not on the [facility] refill protocol Percocet last filled on 8/2/17; last OV 8/17/17  Yanci Springer RN.

## 2017-09-08 NOTE — TELEPHONE ENCOUNTER
.Reason for Call:  Medication or medication refill:    Do you use a [facility]?  Name of the pharmacy and phone number for the current request:      Name of the medication requested: oxyCODONE-acetaminophen (PERCOCET) 5-325 MG per tablet  AT  AT BA    Other request:     Can we leave a detailed message on this number? YES    Phone number patient can be reached at: Home number on file [phone] (home)    Best Time: ANY    Call taken on 9/8/2017 at 9:25 AM by Marlin Silveira

## 2017-09-08 NOTE — TELEPHONE ENCOUNTER
Reason for Call:  Medication or medication refill:    Do you use a [facility]?  Name of the pharmacy and phone number for the current request:      Name of the medication requested: PERCOCET    Other request: PATIENTS SON , ERENDIRA ROJAS WILL BE PICKING UP THE SCRIPT  AT [place].    Can we leave a detailed message on this number? YES    Phone number patient can be reached at: Home number on file [phone] (home)    Best Time: ANY    Call taken on 9/8/2017 at 2:33 PM by Marlin Silveira

## 2017-09-11 NOTE — TELEPHONE ENCOUNTER
Can call pharmacy to okay refill today.   The sig should have read: next fill on 10/10/17 or later.   This fill is okay now.   If new script is needed, route back to me.

## 2017-09-12 DIAGNOSIS — F33.1 MAJOR DEPRESSIVE DISORDER, RECURRENT EPISODE, MODERATE (H): ICD-10-CM

## 2017-09-12 NOTE — TELEPHONE ENCOUNTER
hydrOXYzine (VISTARIL) 25 MG capsule      Last Written Prescription Date: 8/9/17  Last Fill Quantity: 120,  # refills: 0   Last Office Visit with [facility], [facility] or [facility] prescribing provider: 8/17/17 Belia Carlton PA-C [facility]                                         Next 5 appointments (look out 90 days)     Sep 13, 2017  3:00 PM CDT   Office Visit with Vee Marlow MD   [facility]    [address] [place] [address] [phone]

## 2017-09-13 ENCOUNTER — OFFICE VISIT (OUTPATIENT)
Dept: FAMILY MEDICINE | Facility: CLINIC | Age: 57
End: 2017-09-13
Payer: COMMERCIAL

## 2017-09-13 VITALS
HEIGHT: 69 IN | TEMPERATURE: 98.1 F | BODY MASS INDEX: 32.44 KG/M2 | DIASTOLIC BLOOD PRESSURE: 70 MMHG | WEIGHT: 219 LBS | HEART RATE: 84 BPM | OXYGEN SATURATION: 96 % | SYSTOLIC BLOOD PRESSURE: 122 MMHG

## 2017-09-13 DIAGNOSIS — G89.4 CHRONIC PAIN SYNDROME: ICD-10-CM

## 2017-09-13 DIAGNOSIS — M47.816 FACET ARTHROPATHY, LUMBAR: ICD-10-CM

## 2017-09-13 DIAGNOSIS — M48.061 STENOSIS, SPINAL, LUMBAR: Primary | ICD-10-CM

## 2017-09-13 DIAGNOSIS — F33.1 MAJOR DEPRESSIVE DISORDER, RECURRENT EPISODE, MODERATE (H): ICD-10-CM

## 2017-09-13 DIAGNOSIS — F10.10 ALCOHOL ABUSE, EPISODIC DRINKING BEHAVIOR: ICD-10-CM

## 2017-09-13 DIAGNOSIS — Z23 NEED FOR PROPHYLACTIC VACCINATION AND INOCULATION AGAINST INFLUENZA: ICD-10-CM

## 2017-09-13 PROCEDURE — 99214 OFFICE O/P EST MOD 30 MIN: CPT | Performed by: FAMILY MEDICINE

## 2017-09-13 ASSESSMENT — PAIN SCALES - GENERAL: PAINLEVEL: MILD PAIN (3)

## 2017-09-13 NOTE — NURSING NOTE
"Chief Complaint   Patient presents with     Forms       Initial /70 (BP Location: Right arm, Patient Position: Chair, Cuff Size: Adult Large)  Pulse 84  Temp 98.1  F (36.7  C) (Oral)  Ht 1.753 m (5' 9\")  Wt 99.3 kg (219 lb)  LMP 06/05/2013  SpO2 96%  Breastfeeding? No  BMI 32.34 kg/m2 Estimated body mass index is 32.34 kg/(m^2) as calculated from the following:    Height as of this encounter: 1.753 m (5' 9\").    Weight as of this encounter: 99.3 kg (219 lb).  Medication Reconciliation: complete     GLORY Rodriguez MA      "

## 2017-09-13 NOTE — LETTER
[address] [address]  [phone]          September 13, 2017    RE:  Jina Reeves                                                                                                                                                       [address]  [facility] [address]            To whom it may concern:    Jina Reeves was seen today in our office.       Sincerely,        Vee Marlow MD

## 2017-09-13 NOTE — PROGRESS NOTES
"  SUBJECTIVE:   Jina Reeves is a 56 year old female who presents to clinic today for the following health issues:    Forms.      Pt is in today with disability forms-  this is her appeal, as she was denied the first time applying.  -Back pain was first evaluated 8/21/13  -she is in the office about every 6 months.  -diagnosis of chronic pain- lumbar arthropathy, spinal stenosis,    -last MRI lumbar spine 2014  -Pain goes down the lateral side of her legs.  -Primary sx's include low back and leg pain with associated weakness.  -She is limited in most daily activities such as reaching above her head to grab something, standing/walking/sitting extended periods of time, bending over to take off shoes, \"chores around the house.\"  -Pain is \"very sharp\" and constant. Tingling is mostly in the left foot. No numbness  -Pt has not had surgery- it was recommended but she declined. She has had 3 injections, but feels the effects only last a day.  -she usually elevates legs (L>R) a few inches for 10 minutes, every 3-4 hours, daily.  -she can lift and carry 5 lbs.   -Leg pain affects depression- moving less often  -Oxycodone is going well, denies AE including BM changes, urinary changes,  -Pt also needs a form saying she was in the office for her forms      She is getting help via her Buddhism: getting food, working, helping her find steady income.    Mood has been good for the last month. She was not participating in activities due to lower extremity edema and back pain. No self harm thoughts and she is not drinking etoh.     Pt's first toe, right foot: nail is discolored, fungal like. She has had this happen in the past and she is thinking she would like it removed.      Problem list and histories reviewed & adjusted, as indicated.  Additional history: as documented    Patient Active Problem List   Diagnosis     Obesity     Cocaine abuse in remission     Hyperlipidemia LDL goal <130     Glucose intolerance (pre-diabetes) "     Iron deficiency anemia     Vitamin D deficiency     Health Care Home (Not Active)      Anxiety state     Major depressive disorder, recurrent episode, moderate (H)     Acne     Family history of colon cancer     Facet arthropathy, lumbar     Stenosis, spinal, lumbar     Alcohol abuse, episodic drinking behavior     Chronic pain     Vitamin D deficiency disease     Depression     History of syphilis     Past Surgical History:   Procedure Laterality Date     COLONOSCOPY  10/29/2013    Procedure: COLONOSCOPY;  COLONOSCOPY-SCREENING / VITO;  Surgeon: Westley Larios MD;  Location: [facility] OR     COLONOSCOPY  10/29/2013    Procedure: COMBINED COLONOSCOPY, SINGLE BIOPSY/POLYPECTOMY BY BIOPSY;;  Surgeon: Westley Larios MD;  Location: [facility] OR       Social History   Substance Use Topics     Smoking status: Former Smoker     Smokeless tobacco: Never Used      Comment: quit  over 20 yrs ago     Alcohol use Yes     Family History   Problem Relation Age of Onset     Cancer - colorectal Sister      age 38     CANCER No family hx of      DIABETES No family hx of      Hypertension No family hx of      CEREBROVASCULAR DISEASE No family hx of      Thyroid Disease No family hx of      Glaucoma No family hx of      Macular Degeneration No family hx of          Current Outpatient Prescriptions   Medication Sig Dispense Refill     oxyCODONE-acetaminophen (PERCOCET) 5-325 MG per tablet Take 1 tablet by mouth every 8 hours as needed for pain Fill on or after 10/10/17 90 tablet 0     topiramate (TOPAMAX) 25 MG tablet TAKE 1 TABLET (25 MG) BY MOUTH 2 TIMES DAILY 60 tablet 0     buPROPion (WELLBUTRIN XL) 150 MG 24 hr tablet TAKE 1 TABLET (150 MG) BY MOUTH EVERY MORNING 30 tablet 0     hydrOXYzine (VISTARIL) 25 MG capsule TAKE 1 CAPSULE (25 MG) BY MOUTH 3 TIMES DAILY AS NEEDED FOR ANXIETY 120 capsule 0     cholecalciferol (VITAMIN D3) 5000 UNITS TABS tablet Take 1 tablet (5,000 Units) by mouth daily 100 tablet 3      "multivitamin, therapeutic with minerals (MULTI-VITAMIN) TABS tablet Take 1 tablet by mouth daily 100 tablet 3     aspirin (ASPIR-LOW) 81 MG EC tablet Take 1 tablet (81 mg) by mouth daily 90 tablet 3     gabapentin (NEURONTIN) 600 MG tablet Take 1 tablet (600 mg) by mouth 3 times daily 270 tablet 1     clindamycin (CLINDAMAX) 1 % gel Apply 1 g topically 2 times daily 60 g 3     DIFFERIN 0.1 % cream Apply  topically At Bedtime. ANGELO.  Will replace Rx for adapalene (DIFFERIN) 0.1 % gel 30 g 5     Ferrous Sulfate GRAN by Misc.(Non-Drug; Combo Route) route.       ORDER FOR DME Equipment being ordered: thumb spica wrist brace 1 Device 0     polyethylene glycol (MIRALAX) powder Take 17 g by mouth daily as needed. 30 capful 11     STATIN NOT PRESCRIBED, INTENTIONAL, by Other route continuous prn.  0     ORDER FOR DME one light box for seasonal affective disorder 1 0     Allergies   Allergen Reactions     Vicodin [Hydrocodone-Acetaminophen] Itching         Reviewed and updated as needed this visit by clinical staffTobacco  Allergies  Meds  Med Hx  Surg Hx  Fam Hx  Soc Hx      Reviewed and updated as needed this visit by Provider           This document serves as a record of the services and decisions personally performed and made by Vee Marlow MD. It was created on her behalf by June Wilhelm, a trained medical scribe. The creation of this document is based the provider's statements to the medical scribe.  June Wilhelm September 13, 2017 3:08 PM      OBJECTIVE:   /70 (BP Location: Right arm, Patient Position: Chair, Cuff Size: Adult Large)  Pulse 84  Temp 98.1  F (36.7  C) (Oral)  Ht 1.753 m (5' 9\")  Wt 99.3 kg (219 lb)  LMP 06/05/2013  SpO2 96%  Breastfeeding? No  BMI 32.34 kg/m2  Body mass index is 32.34 kg/(m^2).  GENERAL: healthy, alert and no distress, obese  EYES: Eyes grossly normal to inspection, PERRL and conjunctivae and sclerae normal  HENT: ear canals and TM's normal, nose " and mouth without ulcers or lesions  NECK: no adenopathy, no asymmetry, masses, or scars and thyroid normal to palpation  RESP: lungs clear to auscultation - no rales, rhonchi or wheezes  CV: regular rate and rhythm, normal S1 S2, no S3 or S4, no murmur, click or rub, no peripheral edema and peripheral pulses strong  ABDOMEN: soft, nontender, no hepatosplenomegaly, no masses and bowel sounds normal  MS: spine: 30 degrees flexion, 5 degree extension, spinal rom reduced 50%, 4.5/5 hip extension, dorsi flexion 4/5 on right and 3/5 on left, plantar flexion is normal,  SKIN: no suspicious lesions or rashes, left foot first toenail: thickened and greyish color, loosened from nail bed  NEURO: mentation intact, speech normal and gait is antalgic  PSYCH: mentation appears normal, affect normal/bright        Diagnostic Test Results:  none     MRI of the Lumbar Spine without contrast     History: Lumbar radiculopathy,Lumbago.     Comparison: none     Technique: Sagittal T1-weighted, T2-weighted, STIR, and axial  T2-weighted spin echo and gradient echo images of the lumbar spine  were obtained without intravenous contrast.          Findings: There are 5 lumbar-type vertebrae.  The tip of the conus  medullaris is at L1-2.  The lumbar vertebral column appears normally  aligned.  There is mild to moderate disc height narrowing at L3-4,  L4-5 and L5-S1 with loss of normal T2 signal consistent with disc  desiccation.  Modic type I endplate changes at L4-5. Increased T2  signal seen in the right pedicle of L5 and the articular processes of  the L4-5 facet.      On a level by level basis:     L1-2: Mild facet arthropathy without significant spinal canal or  foraminal narrowing.     L2-3: Mild facet arthropathy without significant spinal canal or  foraminal narrowing.     L3-4: Posterior disc bulge asymmetrically oriented towards the left  with facet arthropathy causing mild spinal canal narrowing and mild  left neural foraminal  narrowing.     L4-5: Posterior disc bulge, ligamentum flavum thickening and facet  arthropathy causing moderate spinal canal narrowing with mild right  and mild to moderate left neural foraminal narrowing.     L5-S1: Posterior disc bulge with facet arthropathy causing no spinal  canal stenosis, moderate right neural foraminal narrowing, and  moderate to severe left neuroforaminal narrowing.     The visualized paraspinous tissues anteriorly are unremarkable.     IMPRESSION  Impression:   1. Osseous edema seen within the articular processes of the right L4-5  facet joint with extension into the right pedicle of L5, likely  represents active arthropathy and may be a source of lower back pain.  2. Degenerative disease of the lumbar spine as described above, most  pronounced at L4-5 and L5-S1.     I have personally reviewed the examination and initial interpretation  and I agree with the findings.     BENSON TIPTON MD    ASSESSMENT/PLAN:       ICD-10-CM    1. Stenosis, spinal, lumbar M48.06    2. Chronic pain syndrome G89.4    3. Facet arthropathy, lumbar M12.88    4. Major depressive disorder, recurrent episode, moderate (H) F33.1    5. Alcohol abuse, episodic drinking behavior F10.10    6. Need for prophylactic vaccination and inoculation against influenza Z23        Disability forms were filled out with above info. See scanned to chart.   Pt declined flu vaccine today due to never receiving vaccine in the past and no interest. Discussed risks of not vaccinating.         Patient Instructions   Soak your toe and gently wiggle it. Watch for infection.      Length of visit was 28 minutes with more than 50 percent of that time used for discussing medical concerns and education    The information in this document, created by the medical scribe for me, accurately reflects the services I personally performed and the decisions made by me. I have reviewed and approved this document for accuracy.   Vee Marlow,  MD Vee Marlow MD  [facility]

## 2017-09-13 NOTE — MR AVS SNAPSHOT
"              After Visit Summary   9/13/2017    Jina Reeves    MRN: [id number]           Patient Information     Date Of Birth          1960        Visit Information        Provider Department      9/13/2017 3:00 PM Vee Marlow MD [facility]        Today's Diagnoses     Stenosis, spinal, lumbar    -  1    Chronic pain syndrome        Facet arthropathy, lumbar        Major depressive disorder, recurrent episode, moderate (H)        Alcohol abuse, episodic drinking behavior        Need for prophylactic vaccination and inoculation against influenza          Care Instructions    Soak your toe and gently wiggle it. Watch for infection.          Follow-ups after your visit        Who to contact     If you have questions or need follow up information about today's clinic visit or your schedule please contact [facility] directly at [phone].  Normal or non-critical lab and imaging results will be communicated to you by MyChart, letter or phone within 4 business days after the clinic has received the results. If you do not hear from us within 7 days, please contact the clinic through MyChart or phone. If you have a critical or abnormal lab result, we will notify you by phone as soon as possible.  Submit refill requests through [facility] or call your pharmacy and they will forward the refill request to us. Please allow 3 business days for your refill to be completed.          Additional Information About Your Visit        MyChart Information     [facility] lets you send messages to your doctor, view your test results, renew your prescriptions, schedule appointments and more. To sign up, go to [email or web address] . Click on \"Log in\" on the left side of the screen, which will take you to the Welcome page. Then click on \"Sign up Now\" on the right side of the page.     You will be asked to enter the access code listed below, as well as some personal information. " "Please follow the directions to create your username and password.     Your access code is: [email or web address]  Expires: 11/15/2017 11:03 AM     Your access code will  in 90 days. If you need help or a new code, please call your [place] clinic or [phone].        Care EveryWhere ID     This is your Care EveryWhere ID. This could be used by other organizations to access your [place] medical records  [id number]        Your Vitals Were     Pulse Temperature Height Last Period Pulse Oximetry Breastfeeding?    84 98.1  F (36.7  C) (Oral) 1.753 m (5' 9\") 2013 96% No    BMI (Body Mass Index)                   32.34 kg/m2            Blood Pressure from Last 3 Encounters:   17 122/70   17 120/76   17 127/81    Weight from Last 3 Encounters:   17 99.3 kg (219 lb)   17 99.8 kg (220 lb)   17 98.2 kg (216 lb 6.4 oz)              Today, you had the following     No orders found for display       Primary Care Provider Office Phone # Fax #    Vee Marlow [phone] [address] [address]        Equal Access to Services     : Hadii aad ku hadasho Soomaali, waaxda luqadaha, qaybta kaalmada adeegyada, inés fontenot . So [facility] [phone].    ATENCIÓN: Si habla español, tiene a hill disposición servicios gratuitos de asistencia lingüística. Llame al [phone].    We comply with applicable federal civil rights laws and [place] laws. We do not discriminate on the basis of race, color, national origin, age, disability sex, sexual orientation or gender identity.            Thank you!     Thank you for choosing [facility]  for your care. Our goal is always to provide you with excellent care. Hearing back from our patients is one way we can continue to improve our services. Please take a few minutes to complete the written survey that you may receive in the mail after your visit " with us. Thank you!             Your Updated Medication List - Protect others around you: Learn how to safely use, store and throw away your medicines at www.disposemymeds.org.          This list is accurate as of: 9/13/17 11:59 PM.  Always use your most recent med list.                   Brand Name Dispense Instructions for use Diagnosis    aspirin 81 MG EC tablet    ASPIR-LOW    90 tablet    Take 1 tablet (81 mg) by mouth daily    Hyperlipidemia LDL goal <130       buPROPion 150 MG 24 hr tablet    WELLBUTRIN XL    30 tablet    TAKE 1 TABLET (150 MG) BY MOUTH EVERY MORNING    Major depressive disorder, recurrent episode, moderate (H)       cholecalciferol 5000 UNITS Tabs tablet    vitamin D3    100 tablet    Take 1 tablet (5,000 Units) by mouth daily    Vitamin D deficiency disease       clindamycin 1 % topical gel    CLINDAMAX    60 g    Apply 1 g topically 2 times daily    Facial cellulitis       DIFFERIN 0.1 % cream   Generic drug:  adapalene     30 g    Apply  topically At Bedtime. ANGELO. Will replace Rx for adapalene (DIFFERIN) 0.1 % gel    Acne, unspecified acne type       Ferrous Sulfate Gran      by Misc.(Non-Drug; Combo Route) route.        gabapentin 600 MG tablet    NEURONTIN    270 tablet    Take 1 tablet (600 mg) by mouth 3 times daily    Stenosis, spinal, lumbar, Facet arthropathy, lumbar       hydrOXYzine 25 MG capsule    VISTARIL    120 capsule    TAKE 1 CAPSULE (25 MG) BY MOUTH 3 TIMES DAILY AS NEEDED FOR ANXIETY    Major depressive disorder, recurrent episode, moderate (H)       multivitamin, therapeutic with minerals Tabs tablet     100 tablet    Take 1 tablet by mouth daily    Alcohol abuse, episodic drinking behavior       * order for DME     1    one light box for seasonal affective disorder    Seasonal affective disorder (H)       * order for DME     1 Device    Equipment being ordered: thumb spica wrist brace    Pain in wrist, right       oxyCODONE-acetaminophen 5-325 MG per tablet    PERCOCET     90 tablet    Take 1 tablet by mouth every 8 hours as needed for pain Fill on or after 10/10/17    Facet arthropathy, lumbar, Stenosis, spinal, lumbar, Chronic pain syndrome       polyethylene glycol powder    MIRALAX    30 capful    Take 17 g by mouth daily as needed.    Constipation       * STATIN NOT PRESCRIBED (INTENTIONAL)      by Other route continuous prn.    Glucose intolerance (pre-diabetes)       topiramate 25 MG tablet    TOPAMAX    60 tablet    TAKE 1 TABLET (25 MG) BY MOUTH 2 TIMES DAILY    Major depressive disorder, recurrent episode, moderate (H)       * Notice:  This list has 3 medication(s) that are the same as other medications prescribed for you. Read the directions carefully, and ask your doctor or other care provider to review them with you.

## 2017-09-14 RX ORDER — HYDROXYZINE PAMOATE 25 MG/1
CAPSULE ORAL
Qty: 120 CAPSULE | Refills: 3 | Status: SHIPPED | OUTPATIENT
Start: 2017-09-14

## 2017-09-14 NOTE — TELEPHONE ENCOUNTER
Routing to provider - diagnosis attached to this medication is anxiety, not included in RN refill protocol.

## 2017-09-15 ENCOUNTER — TELEPHONE (OUTPATIENT)
Dept: PSYCHIATRY | Facility: CLINIC | Age: 57
End: 2017-09-15

## 2017-09-15 NOTE — TELEPHONE ENCOUNTER
Rec'd incoming forms from [employer], regarding pt's social security disability case.  They are requesting completion of a one-page form.  DYLAN is enclosed.    Routed to Olive AMBROSE CNP

## 2017-09-19 ENCOUNTER — TELEPHONE (OUTPATIENT)
Dept: FAMILY MEDICINE | Facility: CLINIC | Age: 57
End: 2017-09-19

## 2017-09-19 ENCOUNTER — MEDICAL CORRESPONDENCE (OUTPATIENT)
Dept: HEALTH INFORMATION MANAGEMENT | Facility: CLINIC | Age: 57
End: 2017-09-19

## 2017-09-19 NOTE — TELEPHONE ENCOUNTER
This writer attempted to contact Jina on 09/19/17      Reason for call returning call below message and left detailed message.      If patient calls back:   LENORA RN WHEN CALL BACK.        Yanci Springer RN

## 2017-09-19 NOTE — TELEPHONE ENCOUNTER
..Reason for Call:    drug-alcohol screens    Detailed comments: received forms from Patients  for her to sign off on; please call to discuss further/if unavailable  Speak with nurse-[place];   *many details, call to get all the information      Phone Number Patient can be reached at:  phone number:  [phone]     Best Time: anytime    Can we leave a detailed message on this number? Not Applicable    Call taken on 9/19/2017 at 9:38 AM by Ivon Han

## 2017-09-19 NOTE — TELEPHONE ENCOUNTER
Olive Akins returned call from [facility] outpatient psych regarding patients disability forms. Wondering how to proceed- on the disability forms it states patient will not use drugs or alcohol and she is prescribed Percocet. Please advise on how to proceed.  Routing to provider to review and advise.  Yanci Springer RN.

## 2017-09-22 NOTE — TELEPHONE ENCOUNTER
Provider completed pt's form, following discussion with PCP regarding opiate use.  Provider requests to have this reviewed by Dr. Dave prior to submission.    Routed to Dr. Dave

## 2017-09-26 NOTE — TELEPHONE ENCOUNTER
Rec'd completed form again.    Faxed to Gustavo and Gustavo at [phone]    Original sent to scanning, copy temporarily retained in clinic

## 2017-10-05 ENCOUNTER — CARE COORDINATION (OUTPATIENT)
Dept: CARE COORDINATION | Facility: CLINIC | Age: 57
End: 2017-10-05

## 2017-10-05 NOTE — PROGRESS NOTES
10/5/2017 Clinic Care Coordination Contact  [facility]/Voicemail    Referral Source: PCP  Clinical Data: Care Coordinator Outreach  Outreach attempted x 1 As a follow up to last conversation.  Left message on voicemail with call back information and requested return call.    Plan:  If no response,  Care Coordinator will try to reach patient again in 3-5 business days.    Keely Sparks [facility] Services  , Clinic Care Coordination  Clinics:  [facility], [place]  [phone]   10/5/2017   2:43 PM

## 2017-10-10 ENCOUNTER — TELEPHONE (OUTPATIENT)
Dept: FAMILY MEDICINE | Facility: CLINIC | Age: 57
End: 2017-10-10

## 2017-10-10 ENCOUNTER — TELEPHONE (OUTPATIENT)
Dept: PSYCHIATRY | Facility: CLINIC | Age: 57
End: 2017-10-10

## 2017-10-10 DIAGNOSIS — G89.4 CHRONIC PAIN SYNDROME: ICD-10-CM

## 2017-10-10 DIAGNOSIS — M47.816 FACET ARTHROPATHY, LUMBAR: ICD-10-CM

## 2017-10-10 DIAGNOSIS — F33.1 MAJOR DEPRESSIVE DISORDER, RECURRENT EPISODE, MODERATE (H): ICD-10-CM

## 2017-10-10 DIAGNOSIS — F41.1 ANXIETY STATE: ICD-10-CM

## 2017-10-10 RX ORDER — TRAZODONE HYDROCHLORIDE 50 MG/1
TABLET, FILM COATED ORAL
Qty: 30 TABLET | Refills: 5 | OUTPATIENT
Start: 2017-10-10

## 2017-10-10 NOTE — TELEPHONE ENCOUNTER
buPROPion (WELLBUTRIN XL) 150 MG 24 hr tablet       Last Written Prescription Date: 08/09/17  Last Fill Quantity: 30; # refills: 0  Last Office Visit with G, P or [facility] prescribing provider:  09/13/17 Dr. Marlow       Last PHQ-9 score on record=   PHQ-9 SCORE 8/17/2017   Total Score -   Total Score 16       Lab Results   Component Value Date    AST 14 08/17/2017     Lab Results   Component Value Date    ALT 16 08/17/2017

## 2017-10-10 NOTE — TELEPHONE ENCOUNTER
Pharmacy informed.      RN- please remove med and close order.      Jessica MEADOWS, Patient Care

## 2017-10-10 NOTE — TELEPHONE ENCOUNTER
Reason for Call:  Medication or medication refill:    Do you use a [facility]?  Name of the pharmacy and phone number for the current request:  [facility]    Name of the medication requested: Pending Prescriptions:                       Disp   Refills    oxyCODONE-acetaminophen (PERCOCET) 5-325 *90 tab*0            Sig: Take 1 tablet by mouth every 8 hours as needed           for pain Fill on or after 10/10/17        Can we leave a detailed message on this number? YES    Phone number patient can be reached at: Home number on file [phone] (home)    Best Time: Anytime    Call taken on 10/10/2017 at 9:26 AM by Virginia Michaels

## 2017-10-10 NOTE — TELEPHONE ENCOUNTER
oxyCODONE-acetaminophen (PERCOCET) 5-325 MG per tablet      Last Written Prescription Date:  09/08/17  Last Fill Quantity: 90,   # refills: 0  Last Office Visit with [facility] or [facility] prescribing provider: 09/13/17 Dr. Marlow  Future Office visit:       Routing refill request to provider for review/approval because:  Drug not on the [facility], [facility] or [facility] refill protocol or controlled substance

## 2017-10-11 ENCOUNTER — TELEPHONE (OUTPATIENT)
Dept: FAMILY MEDICINE | Facility: CLINIC | Age: 57
End: 2017-10-11

## 2017-10-11 DIAGNOSIS — F33.1 MAJOR DEPRESSIVE DISORDER, RECURRENT EPISODE, MODERATE (H): ICD-10-CM

## 2017-10-11 DIAGNOSIS — F41.1 ANXIETY STATE: ICD-10-CM

## 2017-10-11 RX ORDER — OXYCODONE AND ACETAMINOPHEN 5; 325 MG/1; MG/1
1 TABLET ORAL EVERY 8 HOURS PRN
Qty: 90 TABLET | Refills: 0 | Status: SHIPPED | OUTPATIENT
Start: 2017-10-11 | End: 2017-10-11

## 2017-10-11 RX ORDER — OXYCODONE AND ACETAMINOPHEN 5; 325 MG/1; MG/1
1 TABLET ORAL EVERY 8 HOURS PRN
Qty: 90 TABLET | Refills: 0 | Status: SHIPPED | OUTPATIENT
Start: 2017-10-11 | End: 2017-11-07

## 2017-10-11 NOTE — TELEPHONE ENCOUNTER
..Reason for Call:  Other prescription    Detailed comments: Patient called and she would like to be contacted when her script his ready for . oxyCODONE-acetaminophen (PERCOCET) 5-325 MG per tablet    Phone Number Patient can be reached at: Cell number on file:    Telephone Information:   Mobile [phone]       Best Time: any    Can we leave a detailed message on this number? YES    Call taken on 10/11/2017 at 1:23 PM by Leslee Webb

## 2017-10-12 RX ORDER — TRAZODONE HYDROCHLORIDE 50 MG/1
TABLET, FILM COATED ORAL
Qty: 30 TABLET | Refills: 5 | OUTPATIENT
Start: 2017-10-12

## 2017-10-12 NOTE — TELEPHONE ENCOUNTER
Routing refill request to provider for review/approval because:  Drug not active on patient's medication list  Last filled 10/16 #30 with 5 refills

## 2017-10-12 NOTE — TELEPHONE ENCOUNTER
Wellbutrin XL -. Routing refill request to provider for review/approval because:  PHQ9: >4  Yanci Springer RN.

## 2017-10-18 ENCOUNTER — CARE COORDINATION (OUTPATIENT)
Dept: CARE COORDINATION | Facility: CLINIC | Age: 57
End: 2017-10-18

## 2017-10-18 NOTE — PROGRESS NOTES
"10/18/2017 Clinic Care Coordination Contact  Care Team Conversations    Follow -up call placed to patient. She had not returned my call, stating she lost her phone and numbers.   Pt's son paid for her to fly to [place]. She was there for 5 weeks. Jina feels she has been doing well with her MH.  She is no longer working with her Mental Health , Rachel Graves.  They had communication problems  This writer offered to assist her in calling [facility] to request a new MH Case manger and check on the status of her SMRT process. Pt declined. \"Trying not to get upset before my Disability court date on Dec. 8th.\"  We had discussed working with a Disability  and the SMRT process at the same time, before .  She only wants to focus on her court date at this time.  Pt. stated she will schedule an appt with Olive Akins. When asked about her medications, she said she has all of her medications .  Today Jina had to end the conversation. She is aware of out of office status and how to contact a SW.    Plan: Pt will schedule a Psych appt   SW to follow up 6-8 weeks     Keely Sparks [facility] Services  , Clinic Care Coordination  Clinics:  [facility], [place]  [phone]   10/18/2017   2:52 PM    "

## 2017-10-31 RX ORDER — BUPROPION HYDROCHLORIDE 150 MG/1
TABLET ORAL
Qty: 30 TABLET | Refills: 0 | OUTPATIENT
Start: 2017-10-31

## 2017-11-07 ENCOUNTER — TELEPHONE (OUTPATIENT)
Dept: FAMILY MEDICINE | Facility: CLINIC | Age: 57
End: 2017-11-07

## 2017-11-07 DIAGNOSIS — G89.4 CHRONIC PAIN SYNDROME: ICD-10-CM

## 2017-11-07 DIAGNOSIS — M47.816 FACET ARTHROPATHY, LUMBAR: ICD-10-CM

## 2017-11-07 NOTE — TELEPHONE ENCOUNTER
Reason for Call:  Medication or medication refill:    Do you use a [facility]?  Name of the pharmacy and phone number for the current request:  [facility] [address] [facility] [place] [facility] [place] - [address].      Mail script will not pickup    Name of the medication requested: Percocet 5-325 mg    Other request: please call when approved    Can we leave a detailed message on this number? YES    Phone number patient can be reached at: Cell number on file:    Telephone Information:   Mobile [phone]       Best Time: any    Call taken on 11/7/2017 at 10:25 AM by Iqra Walden

## 2017-11-07 NOTE — TELEPHONE ENCOUNTER
Percocet- Routing refill request to provider for review/approval because:  Drug not on the [facility] refill protocol   Yanci Springer RN.

## 2017-11-08 RX ORDER — OXYCODONE AND ACETAMINOPHEN 5; 325 MG/1; MG/1
1 TABLET ORAL EVERY 8 HOURS PRN
Qty: 90 TABLET | Refills: 0 | Status: SHIPPED | OUTPATIENT
Start: 2017-11-08 | End: 2017-12-06

## 2017-11-09 NOTE — TELEPHONE ENCOUNTER
Rec'd another request for provider completion of enclosed form.  Pt is scheduled for a disability hearing on 12/8/17.    Routed to provider.

## 2017-11-14 ENCOUNTER — TELEPHONE (OUTPATIENT)
Dept: PSYCHIATRY | Facility: CLINIC | Age: 57
End: 2017-11-14

## 2017-11-14 DIAGNOSIS — E55.9 VITAMIN D DEFICIENCY DISEASE: ICD-10-CM

## 2017-11-14 DIAGNOSIS — F33.1 MAJOR DEPRESSIVE DISORDER, RECURRENT EPISODE, MODERATE (H): ICD-10-CM

## 2017-11-14 RX ORDER — BUPROPION HYDROCHLORIDE 150 MG/1
TABLET ORAL
Qty: 30 TABLET | Refills: 0 | OUTPATIENT
Start: 2017-11-14

## 2017-11-14 NOTE — TELEPHONE ENCOUNTER
See previous refill request for this med. Patient follows with psychiatry. Refill should be directed to them

## 2017-11-14 NOTE — TELEPHONE ENCOUNTER
Routing refill request to provider for review/approval because:  Drug not on the [facility] refill protocol     Iris Ventura RN

## 2017-11-15 RX ORDER — RESVER/WINE/BFL/GRPSD/PC/C/POM 200MG-60MG
CAPSULE ORAL
OUTPATIENT
Start: 2017-11-15

## 2017-11-22 NOTE — TELEPHONE ENCOUNTER
Rec'd response from provider stating that form was already completed and prefers not to edit it.    Luc Wadsworth Michelle, RN        Phone Number: [phone]                     Aury, from [employer], called on behalf of her client.   She has sent a form here to [facility] and is not sure if it has been received, wondering if [facility] can call her back.     OK to leave detailed message       Returned call to [facility] and spoke with representative as Aury was not available.  Passed along message that form was rec'd from [employer], however this was completed and sent to them on 9/26/17.  Writer stated that provider does not want to adjust any of the documentation on this form.  Requested a c/b if original form was not rec'd.    Form request sent to scanning.

## 2017-11-24 NOTE — TELEPHONE ENCOUNTER
FW: Return Call         Mony Rey RN Flaa, Debbi WATSON RN       Phone Number: [phone]                       Previous Messages       ----- Message -----      From: Idania Fowler      Sent: 11/22/2017  11:47 AM        To: Virginia Ring RN   Subject: Return Call                                       Olive Akins has a form and Virginia called Aury from [employer].  Aury is calling her back.     Today they close at 4pm. Monday hours 9am-5:15pm     [phone]

## 2017-11-24 NOTE — TELEPHONE ENCOUNTER
LVM hilaria Jeffers stating the form was completed and faxed to them on 9/26/17.  Writer stated that provider does not want to adjust any of the documentation on this form.  Informed Aury writer will re-fax the form and asked her to call back if needed.

## 2017-11-29 NOTE — TELEPHONE ENCOUNTER
11/27/17 at 1046:             Debbi Cohen RN Flaa, Jodi L RN       Phone Number: [phone]                       Previous Messages       ----- Message -----      From: Brii Atwood      Sent: 11/27/2017  10:38 AM        To: Virginia Ring RN   Subject: call back/Tashi Jeffers from [employer] is the caller.She said she's been playing phone tag with Debbi about a fax regarding this pt she sent and we haven't received yet. Writer verified the fax number with caller. Please follow up when you can. Ok to lvm.

## 2017-12-06 ENCOUNTER — TELEPHONE (OUTPATIENT)
Dept: FAMILY MEDICINE | Facility: CLINIC | Age: 57
End: 2017-12-06

## 2017-12-06 DIAGNOSIS — G89.4 CHRONIC PAIN SYNDROME: ICD-10-CM

## 2017-12-06 DIAGNOSIS — M47.816 FACET ARTHROPATHY, LUMBAR: ICD-10-CM

## 2017-12-06 RX ORDER — OXYCODONE AND ACETAMINOPHEN 5; 325 MG/1; MG/1
1 TABLET ORAL EVERY 8 HOURS PRN
Qty: 90 TABLET | Refills: 0 | Status: SHIPPED | OUTPATIENT
Start: 2017-12-06 | End: 2018-01-10

## 2017-12-06 NOTE — TELEPHONE ENCOUNTER
oxyCODONE-acetaminophen (PERCOCET) 5-325 MG per tablet      Last Written Prescription Date:  11/8/17  Last Fill Quantity: 90,   # refills: 0  Last Office Visit: 9/13/17  Future Office visit:       Routing refill request to provider for review/approval because:  Drug not on the [facility], P or [facility] refill protocol or controlled substance

## 2017-12-06 NOTE — TELEPHONE ENCOUNTER
Reason for Call:  Medication or medication refill:    Do you use a [facility]?  Name of the pharmacy and phone number for the current request:  Written request    Name of the medication requested: oxyCODONE-acetaminophen (PERCOCET) 5-325 MG per tablet    Other request: Would like to pick it up at clinic on Friday if possible    Can we leave a detailed message on this number? YES    Phone number patient can be reached at: Home number on file [phone] (home)    Best Time: Any    Call taken on 12/6/2017 at 3:24 PM by Mat Palmer

## 2017-12-06 NOTE — TELEPHONE ENCOUNTER
please place rx at front for  and update patient when completed.  Refill not due till 12/10 and rx updated with this info

## 2017-12-28 ENCOUNTER — CARE COORDINATION (OUTPATIENT)
Dept: CARE COORDINATION | Facility: CLINIC | Age: 57
End: 2017-12-28

## 2017-12-28 NOTE — PROGRESS NOTES
Clinic Care Coordination Contact  Care Team Conversations    SW contacted pt for follow up on previous resources. Pt stated that things had been going well since last contact with SW. Pt stated she had no update from  hearing nor in regards to her MH CM. SW educated pt on how to contact SW if needed before next outreach. Pt requested to be contacted by  JESIKA upon her return to clinic.    OLEG Clifton  Care Coordination  [place]  [phone]  [email or web address]

## 2018-01-10 ENCOUNTER — TELEPHONE (OUTPATIENT)
Dept: FAMILY MEDICINE | Facility: CLINIC | Age: 58
End: 2018-01-10

## 2018-01-10 DIAGNOSIS — M47.816 FACET ARTHROPATHY, LUMBAR: ICD-10-CM

## 2018-01-10 DIAGNOSIS — G89.4 CHRONIC PAIN SYNDROME: ICD-10-CM

## 2018-01-10 RX ORDER — OXYCODONE AND ACETAMINOPHEN 5; 325 MG/1; MG/1
1 TABLET ORAL EVERY 8 HOURS PRN
Qty: 90 TABLET | Refills: 0 | Status: SHIPPED | OUTPATIENT
Start: 2018-01-10 | End: 2018-02-08

## 2018-01-10 NOTE — TELEPHONE ENCOUNTER
Requested Prescriptions   Pending Prescriptions Disp Refills     oxyCODONE-acetaminophen (PERCOCET) 5-325 MG per tablet 90 tablet 0     Sig: Take 1 tablet by mouth every 8 hours as needed for pain Fill on or after 12/10/17    There is no refill protocol information for this order        Hannah Smyth RN, [facility]

## 2018-01-10 NOTE — TELEPHONE ENCOUNTER
Pending Prescriptions:                       Disp   Refills    oxyCODONE-acetaminophen (PERCOCET) 5-325 *90 tab*0            Sig: Take 1 tablet by mouth every 8 hours as needed           for pain Fill on or after 12/10/17    Call when ready for  at   Ok to leave message    [phone]    Thank you

## 2018-01-12 NOTE — TELEPHONE ENCOUNTER
Patient's niece, Ghazal Isaacs will be picking up prescription.  Please call Jina with any questions at [phone].  Ok to leave message.    Thank you,    Nancy Oneill

## 2018-02-08 ENCOUNTER — TELEPHONE (OUTPATIENT)
Dept: FAMILY MEDICINE | Facility: CLINIC | Age: 58
End: 2018-02-08

## 2018-02-08 DIAGNOSIS — M47.816 FACET ARTHROPATHY, LUMBAR: ICD-10-CM

## 2018-02-08 DIAGNOSIS — G89.4 CHRONIC PAIN SYNDROME: ICD-10-CM

## 2018-02-08 RX ORDER — OXYCODONE AND ACETAMINOPHEN 5; 325 MG/1; MG/1
1 TABLET ORAL EVERY 8 HOURS PRN
Qty: 90 TABLET | Refills: 0 | Status: SHIPPED | OUTPATIENT
Start: 2018-02-08 | End: 2018-03-12

## 2018-02-08 NOTE — TELEPHONE ENCOUNTER
Requested Prescriptions   Pending Prescriptions Disp Refills     oxyCODONE-acetaminophen (PERCOCET) 5-325 MG per tablet 90 tablet 0     Sig: Take 1 tablet by mouth every 8 hours as needed for pain Fill on or after 1/10/18    There is no refill protocol information for this order        oxyCODONE-acetaminophen (PERCOCET) 5-325 MG per tablet      Last Written Prescription Date:  1/10/18  Last Fill Quantity: 90,   # refills: 0  Last Office Visit: 9/13/17  Future Office visit:       Routing refill request to provider for review/approval because:  Drug not on the [facility], P or [facility] refill protocol or controlled substance

## 2018-02-08 NOTE — TELEPHONE ENCOUNTER
Reason for Call:  Medication or medication refill:    Do you use a [facility]?  Name of the pharmacy and phone number for the current request:  WRITTEN PRESCRIPTION REQUESTED    Name of the medication requested: oxyCODONE-acetaminophen (PERCOCET) 5-325 MG per tablet    Other request:     Can we leave a detailed message on this number? YES    Phone number patient can be reached at: Home number on file [phone] (home)    Best Time: anytime     Call taken on 2/8/2018 at 10:34 AM by Alma Cavazos

## 2018-02-08 NOTE — TELEPHONE ENCOUNTER
please place rx at front for  and update patient when completed.    Patient due for med check prior to next refill. Please assist her with scheduling.

## 2018-02-09 NOTE — TELEPHONE ENCOUNTER
Rx placed at .  Pt informed and she was advised that she is due for a med check prior to her next refill.    Hilda DRAPER)(DAVIDA)

## 2018-02-12 ENCOUNTER — TELEPHONE (OUTPATIENT)
Dept: FAMILY MEDICINE | Facility: CLINIC | Age: 58
End: 2018-02-12

## 2018-02-12 DIAGNOSIS — F41.1 ANXIETY STATE: ICD-10-CM

## 2018-02-12 DIAGNOSIS — F33.1 MAJOR DEPRESSIVE DISORDER, RECURRENT EPISODE, MODERATE (H): ICD-10-CM

## 2018-02-13 NOTE — TELEPHONE ENCOUNTER
"Requested Prescriptions   Pending Prescriptions Disp Refills     traZODone (DESYREL) 50 MG tablet [Pharmacy Med Name: TRAZODONE 50 MG TABLET]        Last Written Prescription Date:  3/3/17  Last Fill Quantity: n/a,   # refills: n/a  Last Office Visit: 09/13/17  Future Office visit:       Routing refill request to provider for review/approval because:  Drug not active on patient's medication list  Medication is reported/historical  30 tablet 5     Sig: TAKE 1-2 TABLETS ( MG) BY MOUTH AT BEDTIME    Serotonin Modulators Passed    2/12/2018  5:52 PM       Passed - Recent or future visit with authorizing provider's specialty    Patient had office visit in the last year or has a visit in the next 30 days with authorizing provider.  See \"Patient Info\" tab in inbasket, or \"Choose Columns\" in Meds & Orders section of the refill encounter.            Passed - Patient is age 18 or older       Passed - No active pregnancy on record       Passed - No positive pregnancy test in past 12 months          "

## 2018-02-15 NOTE — TELEPHONE ENCOUNTER
.Reason for Call:  Other call back    Detailed comments: [facility] pharmacy called and checking on the status of the traZODONE 50 MG.    Phone Number Patient can be reached at: Other phone number:  [phone]    Best Time: ANY    Can we leave a detailed message on this number? Not Applicable    Call taken on 2/15/2018 at 10:59 AM by Leslee Webb

## 2018-02-16 NOTE — TELEPHONE ENCOUNTER
Med has not been prescribed recently. She follows with psychiatry who would be in charge of this refill. Seen refill note 10/10/17.   Please update pharmacy to redirect to psychiatry

## 2018-02-16 NOTE — TELEPHONE ENCOUNTER
RN unable to fill refill request per protocol. As noted below not on medication list.    Hannah Smyth RN, [facility]

## 2018-02-19 RX ORDER — TRAZODONE HYDROCHLORIDE 50 MG/1
TABLET, FILM COATED ORAL
Qty: 30 TABLET | Refills: 5 | OUTPATIENT
Start: 2018-02-19

## 2018-02-22 ENCOUNTER — CARE COORDINATION (OUTPATIENT)
Dept: CARE COORDINATION | Facility: CLINIC | Age: 58
End: 2018-02-22

## 2018-02-22 NOTE — PROGRESS NOTES
2/22/2018 Clinic Care Coordination Contact  Care Team Conversations -Social Work    Follow up call to pt. She was happy ;to share her disability has been approved.She has not yet received any money , but should in the next few weeks. This is a huge relief for her.   Jina is currently living with a friend in [place].  She feels this is a good situation.   She asked me to change her address.  Disused no recent Psychiatry appointments. She stated she will schedule an appt with Olive Akins, because she needs her medications refilled.  Also plans to make an appt with Dr. Marlow.  Moods have been pretty good, still having trouble sleeping.  No specific concerns or questions. Pt asked me to check back with her in 4-6 weeks.     Plan: SW to follow up in 4-6 weeks.  Keely Sparks [facility]  , Clinic Care Coordination  Clinics:  [facility], [place]  [phone]   2/22/2018   10:04 AM

## 2018-03-12 ENCOUNTER — TELEPHONE (OUTPATIENT)
Dept: FAMILY MEDICINE | Facility: CLINIC | Age: 58
End: 2018-03-12

## 2018-03-12 DIAGNOSIS — M47.816 FACET ARTHROPATHY, LUMBAR: ICD-10-CM

## 2018-03-12 DIAGNOSIS — G89.4 CHRONIC PAIN SYNDROME: ICD-10-CM

## 2018-03-12 RX ORDER — OXYCODONE AND ACETAMINOPHEN 5; 325 MG/1; MG/1
1 TABLET ORAL EVERY 8 HOURS PRN
Qty: 90 TABLET | Refills: 0 | Status: SHIPPED | OUTPATIENT
Start: 2018-03-12 | End: 2018-04-18

## 2018-03-12 NOTE — TELEPHONE ENCOUNTER
[place] Prescription Monitoring Program was reviewed and confirmed past prescriptions. No variances were noted.      please place rx at front for  and update patient when completed.   Please reschedule OV in next few weeks. No further refills until seen.

## 2018-03-12 NOTE — TELEPHONE ENCOUNTER
Patient called today.    Patient states family emergency today and cannot do appointment today/still need medication.    PCA will .    Please contact patient.    Thank you.    Central Scheduling  Lida NEGRON

## 2018-03-13 ENCOUNTER — TELEPHONE (OUTPATIENT)
Dept: FAMILY MEDICINE | Facility: CLINIC | Age: 58
End: 2018-03-13

## 2018-03-13 NOTE — TELEPHONE ENCOUNTER
..Reason for Call:   prescription    Detailed comments: Patient wanted to inform clinic that her PCA Edson Manjarrez will be picking up script;     Phone Number Patient can be reached at: Home number on file [phone] (home)    Best Time: anytime    Can we leave a detailed message on this number? YES    Call taken on 3/13/2018 at 2:38 PM by Ivon Han

## 2018-04-12 ENCOUNTER — PATIENT OUTREACH (OUTPATIENT)
Dept: FAMILY MEDICINE | Facility: CLINIC | Age: 58
End: 2018-04-12

## 2018-04-12 NOTE — PROGRESS NOTES
Clinic Care Coordination Contact    Situation: Patient chart reviewed by care coordinator.    Background: HX of depression, unstable housing Difficulty getting on Disability     Assessment: living with friend.  Connected with MH providers.  Did cancel appt with PCP but rescheduled for 4/18/18.  No appt scheduled with Psychiatry. Has hx of seeing Psych when needed or needing medication. Capable of calling  Pt calls SW with any concerns.    Plan/Recommendations: CC will reach out to pt in 5-6 weeks.    Keely Sparks [facility]  , Clinic Care Coordination  Clinics:  [facility], [place]  [phone]   4/12/2018   7:53 AM

## 2018-04-18 ENCOUNTER — OFFICE VISIT (OUTPATIENT)
Dept: FAMILY MEDICINE | Facility: CLINIC | Age: 58
End: 2018-04-18
Payer: COMMERCIAL

## 2018-04-18 VITALS
HEART RATE: 85 BPM | TEMPERATURE: 97.2 F | BODY MASS INDEX: 29.92 KG/M2 | DIASTOLIC BLOOD PRESSURE: 78 MMHG | OXYGEN SATURATION: 97 % | HEIGHT: 69 IN | SYSTOLIC BLOOD PRESSURE: 116 MMHG | WEIGHT: 202 LBS

## 2018-04-18 DIAGNOSIS — F14.11 COCAINE ABUSE IN REMISSION (H): ICD-10-CM

## 2018-04-18 DIAGNOSIS — F41.1 ANXIETY STATE: ICD-10-CM

## 2018-04-18 DIAGNOSIS — F33.1 MAJOR DEPRESSIVE DISORDER, RECURRENT EPISODE, MODERATE (H): ICD-10-CM

## 2018-04-18 DIAGNOSIS — F10.10 ALCOHOL ABUSE, EPISODIC DRINKING BEHAVIOR: ICD-10-CM

## 2018-04-18 DIAGNOSIS — M47.816 FACET ARTHROPATHY, LUMBAR: ICD-10-CM

## 2018-04-18 DIAGNOSIS — M48.061 SPINAL STENOSIS OF LUMBAR REGION, UNSPECIFIED WHETHER NEUROGENIC CLAUDICATION PRESENT: ICD-10-CM

## 2018-04-18 DIAGNOSIS — G89.4 CHRONIC PAIN SYNDROME: Primary | ICD-10-CM

## 2018-04-18 LAB
AMPHETAMINES UR QL: NOT DETECTED NG/ML
BARBITURATES UR QL SCN: NOT DETECTED NG/ML
BENZODIAZ UR QL SCN: NOT DETECTED NG/ML
BUPRENORPHINE UR QL: NOT DETECTED NG/ML
CANNABINOIDS UR QL: ABNORMAL NG/ML
COCAINE UR QL SCN: ABNORMAL NG/ML
D-METHAMPHET UR QL: NOT DETECTED NG/ML
ETHANOL UR QL SCN: NEGATIVE
METHADONE UR QL SCN: NOT DETECTED NG/ML
OPIATES UR QL SCN: NOT DETECTED NG/ML
OXYCODONE UR QL SCN: ABNORMAL NG/ML
PCP UR QL SCN: NOT DETECTED NG/ML
PROPOXYPH UR QL: NOT DETECTED NG/ML
TRICYCLICS UR QL SCN: NOT DETECTED NG/ML

## 2018-04-18 PROCEDURE — 80306 DRUG TEST PRSMV INSTRMNT: CPT | Performed by: FAMILY MEDICINE

## 2018-04-18 PROCEDURE — 80320 DRUG SCREEN QUANTALCOHOLS: CPT | Performed by: FAMILY MEDICINE

## 2018-04-18 PROCEDURE — 99214 OFFICE O/P EST MOD 30 MIN: CPT | Performed by: FAMILY MEDICINE

## 2018-04-18 RX ORDER — GABAPENTIN 300 MG/1
CAPSULE ORAL
Qty: 90 CAPSULE | Refills: 0 | Status: SHIPPED | OUTPATIENT
Start: 2018-04-18

## 2018-04-18 RX ORDER — OXYCODONE AND ACETAMINOPHEN 5; 325 MG/1; MG/1
1 TABLET ORAL EVERY 8 HOURS PRN
Qty: 90 TABLET | Refills: 0 | Status: SHIPPED | OUTPATIENT
Start: 2018-04-18

## 2018-04-18 ASSESSMENT — ANXIETY QUESTIONNAIRES
6. BECOMING EASILY ANNOYED OR IRRITABLE: SEVERAL DAYS
1. FEELING NERVOUS, ANXIOUS, OR ON EDGE: SEVERAL DAYS
5. BEING SO RESTLESS THAT IT IS HARD TO SIT STILL: SEVERAL DAYS
2. NOT BEING ABLE TO STOP OR CONTROL WORRYING: SEVERAL DAYS
3. WORRYING TOO MUCH ABOUT DIFFERENT THINGS: NEARLY EVERY DAY
7. FEELING AFRAID AS IF SOMETHING AWFUL MIGHT HAPPEN: NOT AT ALL
GAD7 TOTAL SCORE: 7

## 2018-04-18 ASSESSMENT — PATIENT HEALTH QUESTIONNAIRE - PHQ9: 5. POOR APPETITE OR OVEREATING: NOT AT ALL

## 2018-04-18 ASSESSMENT — PAIN SCALES - GENERAL: PAINLEVEL: EXTREME PAIN (8)

## 2018-04-18 NOTE — PATIENT INSTRUCTIONS
Start gabapentin 1x daily (at night) for 3 days. If you are doing well with this add another dose in the morning for 3 days. If you continue to do well with this add in third dose mid day.
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
## 2018-04-18 NOTE — MR AVS SNAPSHOT
"              After Visit Summary   4/18/2018    Jina Reeves    MRN: [id number]           Patient Information     Date Of Birth          1960        Visit Information        Provider Department      4/18/2018 12:40 PM Vee Marlow MD [facility]        Today's Diagnoses     Chronic pain syndrome    -  1    Spinal stenosis of lumbar region, unspecified whether neurogenic claudication present        Major depressive disorder, recurrent episode, moderate (H)        Anxiety state        Cocaine abuse in remission        Alcohol abuse, episodic drinking behavior        Facet arthropathy, lumbar          Care Instructions    Start gabapentin 1x daily (at night) for 3 days. If you are doing well with this add another dose in the morning for 3 days. If you continue to do well with this add in third dose mid day.           Follow-ups after your visit        Who to contact     If you have questions or need follow up information about today's clinic visit or your schedule please contact [facility] directly at [phone].  Normal or non-critical lab and imaging results will be communicated to you by MyChart, letter or phone within 4 business days after the clinic has received the results. If you do not hear from us within 7 days, please contact the clinic through [employer] or phone. If you have a critical or abnormal lab result, we will notify you by phone as soon as possible.  Submit refill requests through [employer] or call your pharmacy and they will forward the refill request to us. Please allow 3 business days for your refill to be completed.          Additional Information About Your Visit        Intentivahart Information     [employer] lets you send messages to your doctor, view your test results, renew your prescriptions, schedule appointments and more. To sign up, go to [email or web address] . Click on \"Log in\" on the left side of the screen, which will take you to the " "Welcome page. Then click on \"Sign up Now\" on the right side of the page.     You will be asked to enter the access code listed below, as well as some personal information. Please follow the directions to create your username and password.     Your access code is: [email or web address]  Expires: 2018 12:58 PM     Your access code will  in 90 days. If you need help or a new code, please call your [facility] clinic or [phone].        Care EveryWhere ID     This is your Care EveryWhere ID. This could be used by other organizations to access your [facility] medical records  [id number]        Your Vitals Were     Pulse Temperature Height Last Period Pulse Oximetry Breastfeeding?    85 97.2  F (36.2  C) (Oral) 1.74 m (5' 8.5\") 2013 97% No    BMI (Body Mass Index)                   30.27 kg/m2            Blood Pressure from Last 3 Encounters:   18 116/78   17 122/70   17 120/76    Weight from Last 3 Encounters:   18 91.6 kg (202 lb)   17 99.3 kg (219 lb)   17 99.8 kg (220 lb)              We Performed the Following     Ethanol urine     Urine Drugs of Abuse Screen Panel 13          Today's Medication Changes          These changes are accurate as of 18 12:58 PM.  If you have any questions, ask your nurse or doctor.               These medicines have changed or have updated prescriptions.        Dose/Directions    * gabapentin 600 MG tablet   Commonly known as:  NEURONTIN   This may have changed:  Another medication with the same name was added. Make sure you understand how and when to take each.   Used for:  Stenosis, spinal, lumbar, Facet arthropathy, lumbar   Changed by:  Vee Marlow MD        Dose:  600 mg   Take 1 tablet (600 mg) by mouth 3 times daily   Quantity:  270 tablet   Refills:  1       * gabapentin 300 MG capsule   Commonly known as:  NEURONTIN   This may have changed:  You were already taking a medication with the same name, and this " prescription was added. Make sure you understand how and when to take each.   Used for:  Chronic pain syndrome, Spinal stenosis of lumbar region, unspecified whether neurogenic claudication present   Changed by:  Vee Marlow MD        Take 1 tablet (300 mg) every night for 1-3 days, then 1 tablet twice daily for 1-3 days, then 1 tablet three times daily   Quantity:  90 capsule   Refills:  0       * Notice:  This list has 2 medication(s) that are the same as other medications prescribed for you. Read the directions carefully, and ask your doctor or other care provider to review them with you.         Where to get your medicines      These medications were sent to [facility]/pharmacy #[id number] - [facility] [address] [place] AT [facility]  [address], [facility] [address]     Phone:  [phone]     gabapentin 300 MG capsule         Some of these will need a paper prescription and others can be bought over the counter.  Ask your nurse if you have questions.     Bring a paper prescription for each of these medications     oxyCODONE-acetaminophen 5-325 MG per tablet               Information about OPIOIDS     PRESCRIPTION OPIOIDS: WHAT YOU NEED TO KNOW   You have a prescription for an opioid (narcotic) pain medicine. Opioids can cause addiction. If you have a history of chemical dependency of any type, you are at a higher risk of becoming addicted to opioids. Only take this medicine after all other options have been tried. Take it for as short a time and as few doses as possible.     Do not:    Drive. If you drive while taking these medicines, you could be arrested for driving under the influence (DUI).    Operate heavy machinery    Do any other dangerous activities while taking these medicines.     Drink any alcohol while taking these medicines.      Take with any other medicines that contain acetaminophen. Read all labels carefully. Look for the word  acetaminophen  or   Tylenol.  Ask your pharmacist if you have questions or are unsure.    Store your pills in a secure place, locked if possible. We will not replace any lost or stolen medicine. If you don t finish your medicine, please throw away (dispose) as directed by your pharmacist. The [place] Pollution Control Agency has more information about safe disposal: https://www.pca.[facility].mn.us/living-green/managing-unwanted-medications    All opioids tend to cause constipation. Drink plenty of water and eat foods that have a lot of fiber, such as fruits, vegetables, prune juice, apple juice and high-fiber cereal. Take a laxative (Miralax, milk of magnesia, Colace, Senna) if you don t move your bowels at least every other day.          Primary Care Provider Office Phone # Fax #    Vee Marlow [phone] [address]  [facility] [address]        Equal Access to Services     CHESTER NIELSON : Hadii asim bullock hadasho Soomaali, waaxda luqadaha, qaybta kaalmada adeegyada, inés fontenot . So [facility] [phone].    ATENCIÓN: Si habla español, tiene a hill disposición servicios gratuitos de asistencia lingüística. Llame al [phone].    We comply with applicable federal civil rights laws and [place] laws. We do not discriminate on the basis of race, color, national origin, age, disability, sex, sexual orientation, or gender identity.            Thank you!     Thank you for choosing [facility]  for your care. Our goal is always to provide you with excellent care. Hearing back from our patients is one way we can continue to improve our services. Please take a few minutes to complete the written survey that you may receive in the mail after your visit with us. Thank you!             Your Updated Medication List - Protect others around you: Learn how to safely use, store and throw away your medicines at www.disposemymeds.org.          This list is accurate as of  4/18/18 12:58 PM.  Always use your most recent med list.                   Brand Name Dispense Instructions for use Diagnosis    aspirin 81 MG EC tablet    ASPIR-LOW    90 tablet    Take 1 tablet (81 mg) by mouth daily    Hyperlipidemia LDL goal <130       buPROPion 150 MG 24 hr tablet    WELLBUTRIN XL    30 tablet    TAKE 1 TABLET (150 MG) BY MOUTH EVERY MORNING    Major depressive disorder, recurrent episode, moderate (H)       cholecalciferol 5000 units Tabs tablet    vitamin D3    100 tablet    Take 1 tablet (5,000 Units) by mouth daily    Vitamin D deficiency disease       clindamycin 1 % topical gel    CLINDAMAX    60 g    Apply 1 g topically 2 times daily    Facial cellulitis       DIFFERIN 0.1 % cream   Generic drug:  adapalene     30 g    Apply  topically At Bedtime. ANGELO. Will replace Rx for adapalene (DIFFERIN) 0.1 % gel    Acne, unspecified acne type       Ferrous Sulfate Gran      by Misc.(Non-Drug; Combo Route) route.        * gabapentin 600 MG tablet    NEURONTIN    270 tablet    Take 1 tablet (600 mg) by mouth 3 times daily    Stenosis, spinal, lumbar, Facet arthropathy, lumbar       * gabapentin 300 MG capsule    NEURONTIN    90 capsule    Take 1 tablet (300 mg) every night for 1-3 days, then 1 tablet twice daily for 1-3 days, then 1 tablet three times daily    Chronic pain syndrome, Spinal stenosis of lumbar region, unspecified whether neurogenic claudication present       hydrOXYzine 25 MG capsule    VISTARIL    120 capsule    TAKE 1 CAPSULE (25 MG) BY MOUTH 3 TIMES DAILY AS NEEDED FOR ANXIETY    Major depressive disorder, recurrent episode, moderate (H)       multivitamin, therapeutic with minerals Tabs tablet     100 tablet    Take 1 tablet by mouth daily    Alcohol abuse, episodic drinking behavior       * order for DME     1    one light box for seasonal affective disorder    Seasonal affective disorder (H)       * order for DME     1 Device    Equipment being ordered: thumb spica wrist brace     Pain in wrist, right       oxyCODONE-acetaminophen 5-325 MG per tablet    PERCOCET    90 tablet    Take 1 tablet by mouth every 8 hours as needed for pain    Facet arthropathy, lumbar, Chronic pain syndrome       polyethylene glycol powder    MIRALAX    30 capful    Take 17 g by mouth daily as needed.    Constipation       * STATIN NOT PRESCRIBED (INTENTIONAL)      by Other route continuous prn.    Glucose intolerance (pre-diabetes)       topiramate 25 MG tablet    TOPAMAX    60 tablet    TAKE 1 TABLET (25 MG) BY MOUTH 2 TIMES DAILY    Major depressive disorder, recurrent episode, moderate (H)       * Notice:  This list has 5 medication(s) that are the same as other medications prescribed for you. Read the directions carefully, and ask your doctor or other care provider to review them with you.

## 2018-04-18 NOTE — LETTER
April 20, 2018      Jina LEDEZMA Leandro  [address]  [place] [address]        Dear Jina,     It was a pleasure seeing you at your recent visit. Your labs have been reviewed and are attached.     I was sad to see the results of your urine screen. You tested positive for cocaine and marijuana. I will not be able to continue to prescribe the oxycodone for you given this finding, but I would be happy to meet with you to discuss options going forward for your pain management.           Sincerely,        Vee Marlow MD      Results for orders placed or performed in visit on 04/18/18   Urine Drugs of Abuse Screen Panel 13   Result Value Ref Range    Cannabinoids (26-ezh-6-carboxy-9-THC) Detected, Abnormal Result (A) NDET^Not Detected ng/mL    Phencyclidine (Phencyclidine) Not Detected NDET^Not Detected ng/mL    Cocaine (Benzoylecgonine) Detected, Abnormal Result (A) NDET^Not Detected ng/mL    Methamphetamine (d-Methamphetamine) Not Detected NDET^Not Detected ng/mL    Opiates (Morphine) Not Detected NDET^Not Detected ng/mL    Amphetamine (d-Amphetamine) Not Detected NDET^Not Detected ng/mL    Benzodiazepines (Nordiazepam) Not Detected NDET^Not Detected ng/mL    Tricyclic Antidepressants (Desipramine) Not Detected NDET^Not Detected ng/mL    Methadone (Methadone) Not Detected NDET^Not Detected ng/mL    Barbiturates (Butalbital) Not Detected NDET^Not Detected ng/mL    Oxycodone (Oxycodone) Detected, Abnormal Result (A) NDET^Not Detected ng/mL    Propoxyphene (Norpropoxyphene) Not Detected NDET^Not Detected ng/mL    Buprenorphine (Buprenorphine) Not Detected NDET^Not Detected ng/mL   Ethanol urine   Result Value Ref Range    Ethanol Qual Urine Negative NEG^Negative

## 2018-04-19 ASSESSMENT — PATIENT HEALTH QUESTIONNAIRE - PHQ9: SUM OF ALL RESPONSES TO PHQ QUESTIONS 1-9: 10

## 2018-04-19 ASSESSMENT — ANXIETY QUESTIONNAIRES: GAD7 TOTAL SCORE: 7

## 2018-04-25 ENCOUNTER — PATIENT OUTREACH (OUTPATIENT)
Dept: FAMILY MEDICINE | Facility: CLINIC | Age: 58
End: 2018-04-25

## 2018-04-25 DIAGNOSIS — F14.11 COCAINE ABUSE IN REMISSION (H): ICD-10-CM

## 2018-04-25 NOTE — PROGRESS NOTES
Clinic Care Coordination Contact  [facility] Social Work     New referral from PCP to follow up with pt . Assist with Psych appt and CD issues    Clinical Data: Care Coordinator Outreach  Outreach attempted x 1  As a follow up to last conversation.  Left message on voicemail with call back information and requested return call.    Plan: . Care Coordinator will try to reach patient again in 7-10 business days.      Keely Sparks [facility]  , Clinic Care Coordination  Clinics:  [facility] [place], [place]  [phone]   4/25/2018   11:48 AM    Addendum, Received a return call from Jina. She shared she is leaving today to move to [place] . She will initially be staying with her brother. Her son lives in [place] and youngest daughter attends college in [place]. She will be located between them.  She is flying and her nephews are driving a [employer]   . Encouraged Jina to connect with a therapist and Psychiatrist, when she gets to [place] to help her with the adjustment. She stated her brother already made her an appt. For May 3rd with his doctor.     She plans to cancel appt with PCP here for tomorrow and leave her a message about the move. SW will forward note to PCP    Plan:  No further contact planned due to pt moving out of state.    Keely Sparks [facility]  , Clinic Care Coordination  Clinics:  [facility], [place]  [phone]   4/25/2018   12:11 PM

## 2018-04-25 NOTE — LETTER
[place] CARE Coordination  [facility]   [address] [place], [place] [id number] [phone]    April 25, 2018    Jina Reeves  [address]  [place] [facility] [address]      Dear Jina,    I am a clinic care coordinator who works with Vee Marlow MD at the [facility]. I am sending this letter as a follow up to our last conversation.  I have been trying to reach you recently  to see if there was anything I could assist you with.  I wanted to introduce myself and provide you with my contact information so that you can call me with questions or concerns about your health care. Below is a description of clinic care coordination and how I can further assist you.     The clinic care coordinator is a registered nurse and/or  who understand the health care system. The goal of clinic care coordination is to help you manage your health and improve access to the [facility] system in the most efficient manner. The registered nurse can assist you in meeting your health care goals by providing education, coordinating services, and strengthening the communication among your providers. The  can assist you with financial, behavioral, psychosocial, chemical dependency, counseling, and/or psychiatric resources.    Please feel free to contact me at [phone] with any questions or concerns. We at [facility] are focused on providing you with the highest-quality healthcare experience possible and that all starts with you.     Sincerely,       Keely Sparks [facility]  , Clinic Care Coordination  Clinics:  [place],  [place], [facility]  [phone]   4/25/2018   11:52 AM    Enclosed: I have enclosed a copy of a 24 Hour Access Plan. This has helpful phone numbers for you to call when needed. Please keep this in an easy to access place to use as needed.

## 2018-04-25 NOTE — LETTER
Health Care Home - Access Care Plan    About Me  Patient Name:  Jina Reeves    YOB: 1960  Age:                             57 year old   Mir MRN:            [id number] Telephone Information:     Home Phone [phone]   Mobile [phone]       Address:    [address] [facility] [place] [address] Email address:  [email or web address]      Emergency Contact(s)  Name Relationship Lgl Grd Work Phone Home Phone Mobile Phone   1. HOANG ROBBINS Friend    [phone]   2. KARIE TAMEZ Daughter    [phone]             Health Maintenance:      My Access Plan  Medical Emergency 911   Questions or concerns during clinic hours Primary Clinic Line, I will call the clinic directly: [facility] [phone]   24 Hour Appointment Line [phone] or  [phone]) (toll free)   24 Hour Nurse Line [phone] (toll free)   Questions or concerns outside clinic hours 24 Hour Appointment Line, I will call the after-hours on-call line:   [facility] [phone] or [phone]) (toll-free)   Preferred Urgent Care     Preferred Hospital     Preferred Pharmacy [facility] [address]     Behavioral Health Crisis Line The National Suicide Prevention Lifeline at 1-587.544.4789 or 911     My Care Team Members  Patient Care Team       Relationship Specialty Notifications Start End    Vee Marlow MD PCP - General Family Practice  11/15/16     Phone: [phone] Fax: [phone] [address] [facility] [address]    Olive Akins, APRN CNP Nurse Practitioner Nurse Practitioner Psych/Mental Health Admissions 11/10/16     Phone: [phone] Fax: [phone]         [address] [place] [address]    Sheyla Hawkins, PhD Psychologist Psychology  11/10/16     Phone: [phone] Fax: [phone]         [address] [facility] [address] [facility]  [address]    Sylvia Sparks LSW Clinic Care Coordinator   11/21/16     Comment:  Keely Sparks [facility] [place] [phone]    Phone: [phone] Fax: [phone]        Virginia Ring, RN Registered Nurse  Admissions 3/8/17     Jemima Ozuna LGSW   - Clinical  3/21/17     Comment:  [phone]    Phone: [phone] Fax: [phone]         [facility] PSYCHIATRY [address] [address]    Rachel Graves, [employer]-  CM.     5/17/17     Comment:  [phone]           My Medical and Care Information  Problem List   Patient Active Problem List   Diagnosis     Obesity     Cocaine abuse in remission     Hyperlipidemia LDL goal <130     Glucose intolerance (pre-diabetes)     Iron deficiency anemia     Vitamin D deficiency     Health Care Home (Not Active)      Anxiety state     Major depressive disorder, recurrent episode, moderate (H)     Acne     Family history of colon cancer     Facet arthropathy, lumbar     Stenosis, spinal, lumbar     Alcohol abuse, episodic drinking behavior     Chronic pain     Vitamin D deficiency disease     Depression     History of syphilis      Current Medications and Allergies:  See printed Medication Report

## 2018-06-07 ENCOUNTER — TELEPHONE (OUTPATIENT)
Dept: FAMILY MEDICINE | Facility: CLINIC | Age: 58
End: 2018-06-07

## 2018-06-07 DIAGNOSIS — M47.816 FACET ARTHROPATHY, LUMBAR: ICD-10-CM

## 2018-06-07 DIAGNOSIS — G89.4 CHRONIC PAIN SYNDROME: ICD-10-CM

## 2018-06-07 RX ORDER — OXYCODONE AND ACETAMINOPHEN 5; 325 MG/1; MG/1
1 TABLET ORAL EVERY 8 HOURS PRN
Qty: 90 TABLET | Refills: 0 | Status: CANCELLED | OUTPATIENT
Start: 2018-06-07

## 2018-06-07 NOTE — TELEPHONE ENCOUNTER
Pending Prescriptions:                       Disp   Refills    oxyCODONE-acetaminophen (PERCOCET) 5-325 *90 tab*0            Sig: Take 1 tablet by mouth every 8 hours as needed           for pain    Patient moved to [place], [place]  She is having a hard time getting this refilled as the Dr she saw there referred her to pain management    The pain management Dr has her records but she hasn't gotten an appointment with him yet    Call patient to advise how you could help  [phone]    Ok to leave message

## 2018-06-07 NOTE — TELEPHONE ENCOUNTER
Routing refill request to provider for review/approval because:  Drug not on the [facility] refill protocol     Dhaval Gambino RN, BSN

## 2018-08-13 ENCOUNTER — TELEPHONE (OUTPATIENT)
Dept: FAMILY MEDICINE | Facility: CLINIC | Age: 58
End: 2018-08-13

## 2018-09-05 ENCOUNTER — TELEPHONE (OUTPATIENT)
Dept: FAMILY MEDICINE | Facility: CLINIC | Age: 58
End: 2018-09-05

## 2018-09-05 ASSESSMENT — PATIENT HEALTH QUESTIONNAIRE - PHQ9: 5. POOR APPETITE OR OVEREATING: SEVERAL DAYS

## 2018-09-05 ASSESSMENT — ANXIETY QUESTIONNAIRES
7. FEELING AFRAID AS IF SOMETHING AWFUL MIGHT HAPPEN: NEARLY EVERY DAY
IF YOU CHECKED OFF ANY PROBLEMS ON THIS QUESTIONNAIRE, HOW DIFFICULT HAVE THESE PROBLEMS MADE IT FOR YOU TO DO YOUR WORK, TAKE CARE OF THINGS AT HOME, OR GET ALONG WITH OTHER PEOPLE: SOMEWHAT DIFFICULT
5. BEING SO RESTLESS THAT IT IS HARD TO SIT STILL: SEVERAL DAYS
3. WORRYING TOO MUCH ABOUT DIFFERENT THINGS: SEVERAL DAYS
1. FEELING NERVOUS, ANXIOUS, OR ON EDGE: NEARLY EVERY DAY
GAD7 TOTAL SCORE: 10
6. BECOMING EASILY ANNOYED OR IRRITABLE: SEVERAL DAYS
2. NOT BEING ABLE TO STOP OR CONTROL WORRYING: NOT AT ALL

## 2018-09-05 NOTE — TELEPHONE ENCOUNTER
Panel Management Review      BP Readings from Last 1 Encounters:   04/18/18 116/78    ,   Lab Results   Component Value Date    A1C 5.5 10/02/2012    A1C 5.3 12/29/2010   , 8/13/2018  Last Office Visit with this department: 8/13/2018    Fail List measure:     Depression / Dysthymia review    Measure:  Needs PHQ-9 score of 4 or less during index window.  Administer PHQ-9 and if score is 5 or more, send encounter to provider for next steps.    5 - 7 month window range: 8/18-12/18    PHQ-9 SCORE 4/14/2017 8/17/2017 4/18/2018   Total Score - - -   Total Score 15 16 10   Some encounter information is confidential and restricted. Go to Review Flowsheets activity to see all data.       If PHQ-9 recheck is 5 or more, route to provider for next steps.    Patient is due for:  PHQ9      Patient is due/failing the following:   PHQ9    Action needed:   Patient needs to do PHQ9.    Type of outreach:    Phone, spoke to patient.  spoke with pt and completed phq9/paulette    Questions for provider review:    Please review, pt over 4                                                                                                                                    Mami Albarran CMA      Chart routed to Care Team .

## 2018-09-06 ASSESSMENT — PATIENT HEALTH QUESTIONNAIRE - PHQ9: SUM OF ALL RESPONSES TO PHQ QUESTIONS 1-9: 11

## 2018-09-06 ASSESSMENT — ANXIETY QUESTIONNAIRES: GAD7 TOTAL SCORE: 10

## 2019-06-05 ENCOUNTER — TELEPHONE (OUTPATIENT)
Dept: FAMILY MEDICINE | Facility: CLINIC | Age: 59
End: 2019-06-05

## 2019-06-05 NOTE — TELEPHONE ENCOUNTER
Panel Management Review   One phone call and send letter if unable to reach them or [employer] message and send letter if not read after 2 weeks (You will get a message to your inbasket)          Health Maintenance Due   Topic Date Due     ADVANCED DIRECTIVE PLANNING  1960     ZOSTER IMMUNIZATION (1 of 2) 10/12/2011     EYE EXAM  01/12/2016     COLONOSCOPY  10/29/2016     PREVENTIVE CARE VISIT  01/22/2017     PAP  01/22/2019     PHQ-9  03/05/2019     URINE DRUG SCREEN  04/18/2019     DTAP/TDAP/TD IMMUNIZATION (3 - Td) 05/27/2019     MAMMO SCREENING  04/13/2019        PATIENT MOVED TO [place] - NO LONGER PATIENT OF DR. HERRING.   L[employer], MEDICAL ASSISTANT

## 2019-06-11 ENCOUNTER — TELEPHONE (OUTPATIENT)
Dept: FAMILY MEDICINE | Facility: CLINIC | Age: 59
End: 2019-06-11

## 2019-06-11 NOTE — TELEPHONE ENCOUNTER
Panel Management Review   One phone call and send letter if unable to reach them or [employer] message and send letter if not read after 2 weeks (You will get a message to your inbasket)      No longer patient of Dr. Marlow   LXIONG3, MEDICAL ASSISTANT

## 2020-01-01 NOTE — TELEPHONE ENCOUNTER
Social Work Note: Faxed Documents  [facility]    Recipient: Malia Schulte, [facility] CM  Content of faxed material:  CM referral including [facility] DA form, [facility], [facility]    Included SW's direct contact information.     Will route to patient's current psychiatric provider(s) as an FYI.   Please call or page with any immediate needs or concerns.    IRENE Sorto, [facility]  Clinic   Direct: [phone]  Fax: [phone]     Statement Selected

## 2021-01-28 ENCOUNTER — OFFICE VISIT (OUTPATIENT)
Dept: URBAN - METROPOLITAN AREA CLINIC 109 | Facility: CLINIC | Age: 61
End: 2021-01-28

## 2021-04-12 ENCOUNTER — OFFICE VISIT (OUTPATIENT)
Dept: URBAN - METROPOLITAN AREA CLINIC 109 | Facility: CLINIC | Age: 61
End: 2021-04-12

## 2021-04-19 ENCOUNTER — DASHBOARD ENCOUNTERS (OUTPATIENT)
Age: 61
End: 2021-04-19

## 2021-04-19 ENCOUNTER — WEB ENCOUNTER (OUTPATIENT)
Dept: URBAN - METROPOLITAN AREA CLINIC 109 | Facility: CLINIC | Age: 61
End: 2021-04-19

## 2021-04-19 ENCOUNTER — OFFICE VISIT (OUTPATIENT)
Dept: URBAN - METROPOLITAN AREA CLINIC 109 | Facility: CLINIC | Age: 61
End: 2021-04-19
Payer: COMMERCIAL

## 2021-04-19 ENCOUNTER — LAB OUTSIDE AN ENCOUNTER (OUTPATIENT)
Dept: URBAN - METROPOLITAN AREA CLINIC 109 | Facility: CLINIC | Age: 61
End: 2021-04-19

## 2021-04-19 DIAGNOSIS — Z86.010 HISTORY OF COLON POLYPS: ICD-10-CM

## 2021-04-19 PROCEDURE — 993 AGA: Performed by: INTERNAL MEDICINE

## 2021-04-19 NOTE — HPI-TODAY'S VISIT:
The patient has been referred for surveillance colonoscopy.  She has a history 3 colon polyps. Her last colonoscopy was about 7 years ago. Denies C/D, change in bowel habits, blood in the stool, abdominal pain or weight loss. FH of colon cancer in her sister at age 36.  Her father had colon cancer in his late 60s.

## 2021-05-07 ENCOUNTER — OFFICE VISIT (OUTPATIENT)
Dept: URBAN - METROPOLITAN AREA SURGERY CENTER 23 | Facility: SURGERY CENTER | Age: 61
End: 2021-05-07

## 2021-05-30 PROBLEM — 428283002: Status: ACTIVE | Noted: 2021-04-19

## 2021-06-18 ENCOUNTER — OFFICE VISIT (OUTPATIENT)
Dept: URBAN - METROPOLITAN AREA SURGERY CENTER 23 | Facility: SURGERY CENTER | Age: 61
End: 2021-06-18
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
## 2022-03-21 ENCOUNTER — OFFICE VISIT (OUTPATIENT)
Dept: URBAN - METROPOLITAN AREA CLINIC 109 | Facility: CLINIC | Age: 62
End: 2022-03-21

## 2022-03-23 PROBLEM — 275978004 COLON CANCER SCREENING: Status: ACTIVE | Noted: 2022-03-23

## 2022-04-13 ENCOUNTER — OFFICE VISIT (OUTPATIENT)
Dept: URBAN - METROPOLITAN AREA SURGERY CENTER 23 | Facility: SURGERY CENTER | Age: 62
End: 2022-04-13

## 2022-06-02 NOTE — TELEPHONE ENCOUNTER
Percocet      Last Written Prescription Date: 04/24/17  Last Fill Quantity: 45,  # refills: 0   [place] [facility] [place] [facility] or [facility] prescribing provider: 04/14/17    Routing refill request to provider for review/approval because:  Drug not on the [facility] refill protocol   Yanci Francis RN                                                        Z Plasty Text: The lesion was extirpated to the level of the fat with a #15 scalpel blade.  Given the location of the defect, shape of the defect and the proximity to free margins a Z-plasty was deemed most appropriate for repair.  Using a sterile surgical marker, the appropriate transposition arms of the Z-plasty were drawn incorporating the defect and placing the expected incisions within the relaxed skin tension lines where possible.    The area thus outlined was incised deep to adipose tissue with a #15 scalpel blade.  The skin margins were undermined to an appropriate distance in all directions utilizing iris scissors.  The opposing transposition arms were then transposed into place in opposite direction and anchored with interrupted buried subcutaneous sutures.

## 2024-09-10 ENCOUNTER — OFFICE VISIT (OUTPATIENT)
Dept: URBAN - METROPOLITAN AREA CLINIC 109 | Facility: CLINIC | Age: 64
End: 2024-09-10

## 2024-09-12 ENCOUNTER — OFFICE VISIT (OUTPATIENT)
Dept: URBAN - METROPOLITAN AREA CLINIC 109 | Facility: CLINIC | Age: 64
End: 2024-09-12
Payer: COMMERCIAL

## 2024-09-12 ENCOUNTER — LAB OUTSIDE AN ENCOUNTER (OUTPATIENT)
Dept: URBAN - METROPOLITAN AREA CLINIC 109 | Facility: CLINIC | Age: 64
End: 2024-09-12

## 2024-09-12 VITALS
SYSTOLIC BLOOD PRESSURE: 128 MMHG | DIASTOLIC BLOOD PRESSURE: 83 MMHG | HEIGHT: 69 IN | TEMPERATURE: 97.7 F | WEIGHT: 182 LBS | HEART RATE: 67 BPM | BODY MASS INDEX: 26.96 KG/M2

## 2024-09-12 DIAGNOSIS — Z80.0 FAMILY HISTORY OF COLON CANCER: ICD-10-CM

## 2024-09-12 DIAGNOSIS — Z86.010 PERSONAL HISTORY OF COLONIC POLYPS: ICD-10-CM

## 2024-09-12 PROBLEM — 428283002: Status: ACTIVE | Noted: 2024-09-12

## 2024-09-12 PROCEDURE — 99213 OFFICE O/P EST LOW 20 MIN: CPT | Performed by: INTERNAL MEDICINE

## 2024-09-12 RX ORDER — BUPROPION HYDROCHLORIDE 150 MG/1
TAKE 1 TABLET BY MOUTH EVERY MORNING TABLET, EXTENDED RELEASE ORAL
Qty: 30 EACH | Refills: 0 | Status: ACTIVE | COMMUNITY

## 2024-09-12 RX ORDER — DICLOFENAC SODIUM 10 MG/G
APPLY 2 GRAMS TO AFFECTED AREA 3 TIMES A DAY GEL TOPICAL
Qty: 100 GRAM | Refills: 1 | Status: ACTIVE | COMMUNITY

## 2024-09-12 RX ORDER — ESTROGENS, CONJUGATED 0.45 MG/1
TAKE 1 TABLET BY MOUTH EVERY DAY TABLET, FILM COATED ORAL
Qty: 30 EACH | Refills: 1 | Status: ACTIVE | COMMUNITY

## 2024-09-12 NOTE — HPI-TODAY'S VISIT:
The patient is referred back to our clinic by Dr. Turpin's office for a surveillance colonoscopy.  Her last colonoscopy was over 13 years ago and 3 polyps were removed.  She has a family history of colon cancer in her sister and her father.  She denies any recent rectal bleeding, abdominal pain, or abnormal loss of weight.  She has no active cardiopulmonary disease.  No genetic testing has been performed to her knowledge.

## 2024-10-04 ENCOUNTER — OFFICE VISIT (OUTPATIENT)
Dept: URBAN - METROPOLITAN AREA SURGERY CENTER 23 | Facility: SURGERY CENTER | Age: 64
End: 2024-10-04
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
## 2024-12-02 ENCOUNTER — OFFICE VISIT (OUTPATIENT)
Dept: URBAN - METROPOLITAN AREA SURGERY CENTER 23 | Facility: SURGERY CENTER | Age: 64
End: 2024-12-02

## 2024-12-30 ENCOUNTER — TELEPHONE ENCOUNTER (OUTPATIENT)
Dept: URBAN - METROPOLITAN AREA CLINIC 109 | Facility: CLINIC | Age: 64
End: 2024-12-30

## 2025-01-06 ENCOUNTER — OFFICE VISIT (OUTPATIENT)
Dept: URBAN - METROPOLITAN AREA SURGERY CENTER 23 | Facility: SURGERY CENTER | Age: 65
End: 2025-01-06